# Patient Record
Sex: FEMALE | Race: BLACK OR AFRICAN AMERICAN | NOT HISPANIC OR LATINO | Employment: OTHER | URBAN - METROPOLITAN AREA
[De-identification: names, ages, dates, MRNs, and addresses within clinical notes are randomized per-mention and may not be internally consistent; named-entity substitution may affect disease eponyms.]

---

## 2021-03-30 ENCOUNTER — HOSPITAL ENCOUNTER (OUTPATIENT)
Dept: GENERAL RADIOLOGY | Facility: HOSPITAL | Age: 76
Discharge: HOME | End: 2021-03-30

## 2021-03-30 ENCOUNTER — LAB (OUTPATIENT)
Dept: LAB | Facility: HOSPITAL | Age: 76
End: 2021-03-30

## 2021-03-30 ENCOUNTER — HOSPITAL ENCOUNTER (OUTPATIENT)
Dept: CARDIOLOGY | Facility: HOSPITAL | Age: 76
Discharge: HOME | End: 2021-03-30

## 2021-03-30 ENCOUNTER — TRANSCRIBE ORDERS (OUTPATIENT)
Dept: ADMINISTRATIVE | Facility: HOSPITAL | Age: 76
End: 2021-03-30

## 2021-03-30 DIAGNOSIS — Z01.818 PREOP TESTING: ICD-10-CM

## 2021-03-30 DIAGNOSIS — M25.50 ARTHRALGIA, UNSPECIFIED JOINT: ICD-10-CM

## 2021-03-30 DIAGNOSIS — M25.50 ARTHRALGIA, UNSPECIFIED JOINT: Primary | ICD-10-CM

## 2021-03-30 LAB
ALBUMIN SERPL-MCNC: 3.6 G/DL (ref 3.5–5.2)
ALBUMIN/GLOB SERPL: 1.1 G/DL
ALP SERPL-CCNC: 101 U/L (ref 39–117)
ALT SERPL W P-5'-P-CCNC: 8 U/L (ref 1–33)
ANION GAP SERPL CALCULATED.3IONS-SCNC: 8.9 MMOL/L (ref 5–15)
AST SERPL-CCNC: 14 U/L (ref 1–32)
BACTERIA UR QL AUTO: ABNORMAL /HPF
BASOPHILS # BLD AUTO: 0.03 10*3/MM3 (ref 0–0.2)
BASOPHILS NFR BLD AUTO: 0.6 % (ref 0–1.5)
BILIRUB SERPL-MCNC: 0.4 MG/DL (ref 0–1.2)
BILIRUB UR QL STRIP: NEGATIVE
BUN SERPL-MCNC: 15 MG/DL (ref 8–23)
BUN/CREAT SERPL: 23.8 (ref 7–25)
CALCIUM SPEC-SCNC: 9.6 MG/DL (ref 8.6–10.5)
CHLORIDE SERPL-SCNC: 102 MMOL/L (ref 98–107)
CLARITY UR: CLEAR
CO2 SERPL-SCNC: 32.1 MMOL/L (ref 22–29)
COLOR UR: YELLOW
CREAT SERPL-MCNC: 0.63 MG/DL (ref 0.57–1)
DEPRECATED RDW RBC AUTO: 46 FL (ref 37–54)
EOSINOPHIL # BLD AUTO: 0.13 10*3/MM3 (ref 0–0.4)
EOSINOPHIL NFR BLD AUTO: 2.5 % (ref 0.3–6.2)
ERYTHROCYTE [DISTWIDTH] IN BLOOD BY AUTOMATED COUNT: 15.2 % (ref 12.3–15.4)
GFR SERPL CREATININE-BSD FRML MDRD: 112 ML/MIN/1.73
GLOBULIN UR ELPH-MCNC: 3.3 GM/DL
GLUCOSE SERPL-MCNC: 91 MG/DL (ref 65–99)
GLUCOSE UR STRIP-MCNC: NEGATIVE MG/DL
HCT VFR BLD AUTO: 45.6 % (ref 34–46.6)
HGB BLD-MCNC: 15 G/DL (ref 12–15.9)
HGB UR QL STRIP.AUTO: NEGATIVE
HYALINE CASTS UR QL AUTO: ABNORMAL /LPF
IMM GRANULOCYTES # BLD AUTO: 0.01 10*3/MM3 (ref 0–0.05)
IMM GRANULOCYTES NFR BLD AUTO: 0.2 % (ref 0–0.5)
INR PPP: 0.97 (ref 0.93–1.1)
KETONES UR QL STRIP: NEGATIVE
LEUKOCYTE ESTERASE UR QL STRIP.AUTO: NEGATIVE
LYMPHOCYTES # BLD AUTO: 1.44 10*3/MM3 (ref 0.7–3.1)
LYMPHOCYTES NFR BLD AUTO: 27.4 % (ref 19.6–45.3)
MCH RBC QN AUTO: 28.1 PG (ref 26.6–33)
MCHC RBC AUTO-ENTMCNC: 32.9 G/DL (ref 31.5–35.7)
MCV RBC AUTO: 85.6 FL (ref 79–97)
MONOCYTES # BLD AUTO: 0.41 10*3/MM3 (ref 0.1–0.9)
MONOCYTES NFR BLD AUTO: 7.8 % (ref 5–12)
NEUTROPHILS NFR BLD AUTO: 3.23 10*3/MM3 (ref 1.7–7)
NEUTROPHILS NFR BLD AUTO: 61.5 % (ref 42.7–76)
NITRITE UR QL STRIP: NEGATIVE
NRBC BLD AUTO-RTO: 0 /100 WBC (ref 0–0.2)
PH UR STRIP.AUTO: 6 [PH] (ref 5–8)
PLATELET # BLD AUTO: 240 10*3/MM3 (ref 140–450)
PMV BLD AUTO: 10.1 FL (ref 6–12)
POTASSIUM SERPL-SCNC: 3.9 MMOL/L (ref 3.5–5.2)
PROT SERPL-MCNC: 6.9 G/DL (ref 6–8.5)
PROT UR QL STRIP: ABNORMAL
PROTHROMBIN TIME: 10.7 SECONDS (ref 9.6–11.7)
QT INTERVAL: 448 MS
RBC # BLD AUTO: 5.33 10*6/MM3 (ref 3.77–5.28)
RBC # UR: ABNORMAL /HPF
REF LAB TEST METHOD: ABNORMAL
SODIUM SERPL-SCNC: 143 MMOL/L (ref 136–145)
SP GR UR STRIP: 1.02 (ref 1–1.03)
SQUAMOUS #/AREA URNS HPF: ABNORMAL /HPF
UROBILINOGEN UR QL STRIP: ABNORMAL
WBC # BLD AUTO: 5.25 10*3/MM3 (ref 3.4–10.8)
WBC UR QL AUTO: ABNORMAL /HPF

## 2021-03-30 PROCEDURE — 36415 COLL VENOUS BLD VENIPUNCTURE: CPT

## 2021-03-30 PROCEDURE — 81001 URINALYSIS AUTO W/SCOPE: CPT

## 2021-03-30 PROCEDURE — 93005 ELECTROCARDIOGRAM TRACING: CPT | Performed by: ORTHOPAEDIC SURGERY

## 2021-03-30 PROCEDURE — 93010 ELECTROCARDIOGRAM REPORT: CPT | Performed by: INTERNAL MEDICINE

## 2021-03-30 PROCEDURE — 71046 X-RAY EXAM CHEST 2 VIEWS: CPT

## 2021-03-30 PROCEDURE — 80053 COMPREHEN METABOLIC PANEL: CPT

## 2021-03-30 PROCEDURE — 85025 COMPLETE CBC W/AUTO DIFF WBC: CPT

## 2021-03-30 PROCEDURE — 85610 PROTHROMBIN TIME: CPT

## 2021-03-31 ENCOUNTER — APPOINTMENT (OUTPATIENT)
Dept: PREADMISSION TESTING | Facility: HOSPITAL | Age: 76
End: 2021-03-31

## 2021-04-12 RX ORDER — CHLORHEXIDINE GLUCONATE 4 G/100ML
1 SOLUTION TOPICAL SEE ADMIN INSTRUCTIONS
COMMUNITY
End: 2021-04-19 | Stop reason: HOSPADM

## 2021-04-12 RX ORDER — VILAZODONE HYDROCHLORIDE 40 MG/1
40 TABLET ORAL DAILY
COMMUNITY
Start: 2020-12-29

## 2021-04-12 RX ORDER — AMLODIPINE BESYLATE 5 MG/1
10 TABLET ORAL NIGHTLY
COMMUNITY
Start: 2020-12-29

## 2021-04-12 RX ORDER — ATORVASTATIN CALCIUM 20 MG/1
20 TABLET, FILM COATED ORAL DAILY
COMMUNITY
Start: 2020-12-29

## 2021-04-12 RX ORDER — ALLOPURINOL 100 MG/1
100 TABLET ORAL DAILY
COMMUNITY
Start: 2020-12-29

## 2021-04-12 RX ORDER — ALBUTEROL SULFATE 90 UG/1
2 AEROSOL, METERED RESPIRATORY (INHALATION) EVERY 4 HOURS PRN
COMMUNITY

## 2021-04-12 RX ORDER — POTASSIUM CHLORIDE 750 MG/1
TABLET, FILM COATED, EXTENDED RELEASE ORAL
COMMUNITY
Start: 2021-02-02

## 2021-04-12 RX ORDER — TRIAMTERENE AND HYDROCHLOROTHIAZIDE 75; 50 MG/1; MG/1
1 TABLET ORAL DAILY
COMMUNITY
Start: 2020-12-29

## 2021-04-13 ENCOUNTER — APPOINTMENT (OUTPATIENT)
Dept: GENERAL RADIOLOGY | Facility: HOSPITAL | Age: 76
End: 2021-04-13

## 2021-04-13 ENCOUNTER — ANESTHESIA EVENT (OUTPATIENT)
Dept: PERIOP | Facility: HOSPITAL | Age: 76
End: 2021-04-13

## 2021-04-13 ENCOUNTER — HOSPITAL ENCOUNTER (INPATIENT)
Facility: HOSPITAL | Age: 76
LOS: 6 days | Discharge: NURSING, REHAB OR LONG-TERM CARE | End: 2021-04-19
Attending: ORTHOPAEDIC SURGERY | Admitting: ORTHOPAEDIC SURGERY

## 2021-04-13 ENCOUNTER — TRANSCRIBE ORDERS (OUTPATIENT)
Dept: ADMINISTRATIVE | Facility: HOSPITAL | Age: 76
End: 2021-04-13

## 2021-04-13 ENCOUNTER — ANESTHESIA (OUTPATIENT)
Dept: PERIOP | Facility: HOSPITAL | Age: 76
End: 2021-04-13

## 2021-04-13 ENCOUNTER — LAB (OUTPATIENT)
Dept: LAB | Facility: HOSPITAL | Age: 76
End: 2021-04-13

## 2021-04-13 DIAGNOSIS — Z01.818 PRE-OP EXAM: ICD-10-CM

## 2021-04-13 DIAGNOSIS — Z01.818 PRE-OP EXAM: Primary | ICD-10-CM

## 2021-04-13 DIAGNOSIS — T84.038A MECHANICAL LOOSENING OF PROSTHETIC KNEE, INITIAL ENCOUNTER (HCC): Primary | ICD-10-CM

## 2021-04-13 DIAGNOSIS — Z96.659 MECHANICAL LOOSENING OF PROSTHETIC KNEE, INITIAL ENCOUNTER (HCC): Primary | ICD-10-CM

## 2021-04-13 PROBLEM — M19.90 DJD (DEGENERATIVE JOINT DISEASE): Status: ACTIVE | Noted: 2021-04-13

## 2021-04-13 LAB
ALBUMIN SERPL-MCNC: 3.5 G/DL (ref 3.5–5.2)
ALBUMIN/GLOB SERPL: 1.1 G/DL
ALP SERPL-CCNC: 122 U/L (ref 39–117)
ALT SERPL W P-5'-P-CCNC: 60 U/L (ref 1–33)
ANION GAP SERPL CALCULATED.3IONS-SCNC: 9.2 MMOL/L (ref 5–15)
ARTERIAL PATENCY WRIST A: POSITIVE
AST SERPL-CCNC: 77 U/L (ref 1–32)
ATMOSPHERIC PRESS: 751.8 MMHG
BASE EXCESS BLDA CALC-SCNC: 2.6 MMOL/L (ref 0–2)
BASOPHILS # BLD AUTO: 0.01 10*3/MM3 (ref 0–0.2)
BASOPHILS NFR BLD AUTO: 0.1 % (ref 0–1.5)
BDY SITE: ABNORMAL
BILIRUB SERPL-MCNC: 0.4 MG/DL (ref 0–1.2)
BUN SERPL-MCNC: 21 MG/DL (ref 8–23)
BUN/CREAT SERPL: 30.9 (ref 7–25)
CALCIUM SPEC-SCNC: 8.8 MG/DL (ref 8.6–10.5)
CHLORIDE SERPL-SCNC: 98 MMOL/L (ref 98–107)
CO2 SERPL-SCNC: 30.8 MMOL/L (ref 22–29)
CREAT SERPL-MCNC: 0.68 MG/DL (ref 0.57–1)
DEPRECATED RDW RBC AUTO: 45.3 FL (ref 37–54)
EOSINOPHIL # BLD AUTO: 0 10*3/MM3 (ref 0–0.4)
EOSINOPHIL NFR BLD AUTO: 0 % (ref 0.3–6.2)
ERYTHROCYTE [DISTWIDTH] IN BLOOD BY AUTOMATED COUNT: 14.9 % (ref 12.3–15.4)
GAS FLOW AIRWAY: 6 LPM
GFR SERPL CREATININE-BSD FRML MDRD: 103 ML/MIN/1.73
GLOBULIN UR ELPH-MCNC: 3.2 GM/DL
GLUCOSE BLDC GLUCOMTR-MCNC: 132 MG/DL (ref 70–130)
GLUCOSE BLDC GLUCOMTR-MCNC: 205 MG/DL (ref 70–130)
GLUCOSE BLDC GLUCOMTR-MCNC: 207 MG/DL (ref 70–130)
GLUCOSE SERPL-MCNC: 212 MG/DL (ref 65–99)
HCO3 BLDA-SCNC: 30.2 MMOL/L (ref 22–28)
HCT VFR BLD AUTO: 43.1 % (ref 34–46.6)
HGB BLD-MCNC: 13.6 G/DL (ref 12–15.9)
IMM GRANULOCYTES # BLD AUTO: 0.03 10*3/MM3 (ref 0–0.05)
IMM GRANULOCYTES NFR BLD AUTO: 0.4 % (ref 0–0.5)
LYMPHOCYTES # BLD AUTO: 0.34 10*3/MM3 (ref 0.7–3.1)
LYMPHOCYTES NFR BLD AUTO: 4.1 % (ref 19.6–45.3)
MCH RBC QN AUTO: 26.7 PG (ref 26.6–33)
MCHC RBC AUTO-ENTMCNC: 31.6 G/DL (ref 31.5–35.7)
MCV RBC AUTO: 84.7 FL (ref 79–97)
MODALITY: ABNORMAL
MONOCYTES # BLD AUTO: 0.17 10*3/MM3 (ref 0.1–0.9)
MONOCYTES NFR BLD AUTO: 2.1 % (ref 5–12)
NEUTROPHILS NFR BLD AUTO: 7.71 10*3/MM3 (ref 1.7–7)
NEUTROPHILS NFR BLD AUTO: 93.3 % (ref 42.7–76)
NRBC BLD AUTO-RTO: 0 /100 WBC (ref 0–0.2)
NT-PROBNP SERPL-MCNC: 125.2 PG/ML (ref 0–900)
PCO2 BLDA: 57.8 MM HG (ref 35–45)
PH BLDA: 7.33 PH UNITS (ref 7.35–7.45)
PLATELET # BLD AUTO: 231 10*3/MM3 (ref 140–450)
PMV BLD AUTO: 10.6 FL (ref 6–12)
PO2 BLDA: 87.2 MM HG (ref 80–100)
POTASSIUM SERPL-SCNC: 3.5 MMOL/L (ref 3.5–5.2)
PROT SERPL-MCNC: 6.7 G/DL (ref 6–8.5)
RBC # BLD AUTO: 5.09 10*6/MM3 (ref 3.77–5.28)
SAO2 % BLDCOA: 95.6 % (ref 92–99)
SARS-COV-2 RNA RESP QL NAA+PROBE: NOT DETECTED
SODIUM SERPL-SCNC: 138 MMOL/L (ref 136–145)
TOTAL RATE: 18 BREATHS/MINUTE
TROPONIN T SERPL-MCNC: <0.01 NG/ML (ref 0–0.03)
WBC # BLD AUTO: 8.26 10*3/MM3 (ref 3.4–10.8)

## 2021-04-13 PROCEDURE — 82803 BLOOD GASES ANY COMBINATION: CPT

## 2021-04-13 PROCEDURE — C1776 JOINT DEVICE (IMPLANTABLE): HCPCS | Performed by: ORTHOPAEDIC SURGERY

## 2021-04-13 PROCEDURE — 25010000003 CEFAZOLIN IN DEXTROSE 2-4 GM/100ML-% SOLUTION: Performed by: ORTHOPAEDIC SURGERY

## 2021-04-13 PROCEDURE — C1713 ANCHOR/SCREW BN/BN,TIS/BN: HCPCS | Performed by: ORTHOPAEDIC SURGERY

## 2021-04-13 PROCEDURE — 73560 X-RAY EXAM OF KNEE 1 OR 2: CPT

## 2021-04-13 PROCEDURE — 25010000002 ROPIVACAINE PER 1 MG: Performed by: ANESTHESIOLOGY

## 2021-04-13 PROCEDURE — 25010000002 NEOSTIGMINE 5 MG/10ML SOLUTION: Performed by: ANESTHESIOLOGY

## 2021-04-13 PROCEDURE — 36600 WITHDRAWAL OF ARTERIAL BLOOD: CPT

## 2021-04-13 PROCEDURE — 83880 ASSAY OF NATRIURETIC PEPTIDE: CPT | Performed by: HOSPITALIST

## 2021-04-13 PROCEDURE — 94799 UNLISTED PULMONARY SVC/PX: CPT

## 2021-04-13 PROCEDURE — U0003 INFECTIOUS AGENT DETECTION BY NUCLEIC ACID (DNA OR RNA); SEVERE ACUTE RESPIRATORY SYNDROME CORONAVIRUS 2 (SARS-COV-2) (CORONAVIRUS DISEASE [COVID-19]), AMPLIFIED PROBE TECHNIQUE, MAKING USE OF HIGH THROUGHPUT TECHNOLOGIES AS DESCRIBED BY CMS-2020-01-R: HCPCS | Performed by: ORTHOPAEDIC SURGERY

## 2021-04-13 PROCEDURE — 25010000003 CEFAZOLIN IN DEXTROSE 2-4 GM/100ML-% SOLUTION: Performed by: NURSE ANESTHETIST, CERTIFIED REGISTERED

## 2021-04-13 PROCEDURE — 25010000002 PROPOFOL 10 MG/ML EMULSION: Performed by: NURSE ANESTHETIST, CERTIFIED REGISTERED

## 2021-04-13 PROCEDURE — 93005 ELECTROCARDIOGRAM TRACING: CPT | Performed by: HOSPITALIST

## 2021-04-13 PROCEDURE — 76942 ECHO GUIDE FOR BIOPSY: CPT | Performed by: ORTHOPAEDIC SURGERY

## 2021-04-13 PROCEDURE — 71045 X-RAY EXAM CHEST 1 VIEW: CPT

## 2021-04-13 PROCEDURE — 84484 ASSAY OF TROPONIN QUANT: CPT | Performed by: HOSPITALIST

## 2021-04-13 PROCEDURE — 25010000002 MIDAZOLAM PER 1 MG: Performed by: ANESTHESIOLOGY

## 2021-04-13 PROCEDURE — G0378 HOSPITAL OBSERVATION PER HR: HCPCS

## 2021-04-13 PROCEDURE — 25010000002 KETOROLAC TROMETHAMINE PER 15 MG: Performed by: ORTHOPAEDIC SURGERY

## 2021-04-13 PROCEDURE — 25010000002 ONDANSETRON PER 1 MG: Performed by: NURSE ANESTHETIST, CERTIFIED REGISTERED

## 2021-04-13 PROCEDURE — 94760 N-INVAS EAR/PLS OXIMETRY 1: CPT

## 2021-04-13 PROCEDURE — 25010000002 ONDANSETRON PER 1 MG: Performed by: ANESTHESIOLOGY

## 2021-04-13 PROCEDURE — 82962 GLUCOSE BLOOD TEST: CPT

## 2021-04-13 PROCEDURE — 25010000002 FENTANYL CITRATE (PF) 100 MCG/2ML SOLUTION: Performed by: NURSE ANESTHETIST, CERTIFIED REGISTERED

## 2021-04-13 PROCEDURE — 25010000002 MORPHINE PER 10 MG: Performed by: ORTHOPAEDIC SURGERY

## 2021-04-13 PROCEDURE — 0SPD0JZ REMOVAL OF SYNTHETIC SUBSTITUTE FROM LEFT KNEE JOINT, OPEN APPROACH: ICD-10-PCS | Performed by: ORTHOPAEDIC SURGERY

## 2021-04-13 PROCEDURE — 25010000002 PHENYLEPHRINE PER 1 ML: Performed by: NURSE ANESTHETIST, CERTIFIED REGISTERED

## 2021-04-13 PROCEDURE — C9803 HOPD COVID-19 SPEC COLLECT: HCPCS | Performed by: ORTHOPAEDIC SURGERY

## 2021-04-13 PROCEDURE — 25010000002 ROPIVACAINE PER 1 MG: Performed by: ORTHOPAEDIC SURGERY

## 2021-04-13 PROCEDURE — 25010000002 FENTANYL CITRATE (PF) 100 MCG/2ML SOLUTION: Performed by: ANESTHESIOLOGY

## 2021-04-13 PROCEDURE — 80053 COMPREHEN METABOLIC PANEL: CPT | Performed by: NURSE PRACTITIONER

## 2021-04-13 PROCEDURE — 0SRD0J9 REPLACEMENT OF LEFT KNEE JOINT WITH SYNTHETIC SUBSTITUTE, CEMENTED, OPEN APPROACH: ICD-10-PCS | Performed by: ORTHOPAEDIC SURGERY

## 2021-04-13 PROCEDURE — 25010000002 EPINEPHRINE 30 MG/30ML SOLUTION: Performed by: ORTHOPAEDIC SURGERY

## 2021-04-13 PROCEDURE — 85025 COMPLETE CBC W/AUTO DIFF WBC: CPT | Performed by: NURSE PRACTITIONER

## 2021-04-13 PROCEDURE — 25010000002 DEXAMETHASONE PER 1 MG: Performed by: NURSE ANESTHETIST, CERTIFIED REGISTERED

## 2021-04-13 DEVICE — IMPLANTABLE DEVICE: Type: IMPLANTABLE DEVICE | Site: KNEE | Status: FUNCTIONAL

## 2021-04-13 DEVICE — EXT STEM FEM/KN NEXGEN STR 15X75MM 30MM: Type: IMPLANTABLE DEVICE | Site: KNEE | Status: FUNCTIONAL

## 2021-04-13 DEVICE — CMT BONE R 1X40: Type: IMPLANTABLE DEVICE | Site: KNEE | Status: FUNCTIONAL

## 2021-04-13 RX ORDER — ONDANSETRON 2 MG/ML
4 INJECTION INTRAMUSCULAR; INTRAVENOUS ONCE AS NEEDED
Status: COMPLETED | OUTPATIENT
Start: 2021-04-13 | End: 2021-04-13

## 2021-04-13 RX ORDER — SODIUM CHLORIDE 0.9 % (FLUSH) 0.9 %
3 SYRINGE (ML) INJECTION EVERY 12 HOURS SCHEDULED
Status: DISCONTINUED | OUTPATIENT
Start: 2021-04-13 | End: 2021-04-13 | Stop reason: HOSPADM

## 2021-04-13 RX ORDER — ALLOPURINOL 100 MG/1
100 TABLET ORAL DAILY
Status: DISCONTINUED | OUTPATIENT
Start: 2021-04-14 | End: 2021-04-19 | Stop reason: HOSPADM

## 2021-04-13 RX ORDER — NALOXONE HCL 0.4 MG/ML
0.4 VIAL (ML) INJECTION
Status: DISCONTINUED | OUTPATIENT
Start: 2021-04-13 | End: 2021-04-19 | Stop reason: HOSPADM

## 2021-04-13 RX ORDER — ONDANSETRON 2 MG/ML
INJECTION INTRAMUSCULAR; INTRAVENOUS AS NEEDED
Status: DISCONTINUED | OUTPATIENT
Start: 2021-04-13 | End: 2021-04-13 | Stop reason: SURG

## 2021-04-13 RX ORDER — HYDROMORPHONE HYDROCHLORIDE 1 MG/ML
0.5 INJECTION, SOLUTION INTRAMUSCULAR; INTRAVENOUS; SUBCUTANEOUS
Status: DISCONTINUED | OUTPATIENT
Start: 2021-04-13 | End: 2021-04-13 | Stop reason: HOSPADM

## 2021-04-13 RX ORDER — SODIUM CHLORIDE 0.9 % (FLUSH) 0.9 %
1-10 SYRINGE (ML) INJECTION AS NEEDED
Status: DISCONTINUED | OUTPATIENT
Start: 2021-04-13 | End: 2021-04-19 | Stop reason: HOSPADM

## 2021-04-13 RX ORDER — PROMETHAZINE HYDROCHLORIDE 12.5 MG/1
12.5 TABLET ORAL EVERY 6 HOURS PRN
Status: DISCONTINUED | OUTPATIENT
Start: 2021-04-13 | End: 2021-04-19 | Stop reason: HOSPADM

## 2021-04-13 RX ORDER — PROPOFOL 10 MG/ML
VIAL (ML) INTRAVENOUS AS NEEDED
Status: DISCONTINUED | OUTPATIENT
Start: 2021-04-13 | End: 2021-04-13 | Stop reason: SURG

## 2021-04-13 RX ORDER — ONDANSETRON 2 MG/ML
4 INJECTION INTRAMUSCULAR; INTRAVENOUS EVERY 6 HOURS PRN
Status: DISCONTINUED | OUTPATIENT
Start: 2021-04-13 | End: 2021-04-19 | Stop reason: HOSPADM

## 2021-04-13 RX ORDER — GLYCOPYRROLATE 0.2 MG/ML
INJECTION INTRAMUSCULAR; INTRAVENOUS AS NEEDED
Status: DISCONTINUED | OUTPATIENT
Start: 2021-04-13 | End: 2021-04-13 | Stop reason: SURG

## 2021-04-13 RX ORDER — CLINDAMYCIN PHOSPHATE 900 MG/50ML
900 INJECTION INTRAVENOUS EVERY 8 HOURS
Status: COMPLETED | OUTPATIENT
Start: 2021-04-13 | End: 2021-04-14

## 2021-04-13 RX ORDER — VILAZODONE HYDROCHLORIDE 40 MG/1
40 TABLET ORAL DAILY
Status: DISCONTINUED | OUTPATIENT
Start: 2021-04-14 | End: 2021-04-17

## 2021-04-13 RX ORDER — FENTANYL CITRATE 50 UG/ML
50 INJECTION, SOLUTION INTRAMUSCULAR; INTRAVENOUS
Status: DISCONTINUED | OUTPATIENT
Start: 2021-04-13 | End: 2021-04-13 | Stop reason: HOSPADM

## 2021-04-13 RX ORDER — FLUMAZENIL 0.1 MG/ML
0.2 INJECTION INTRAVENOUS AS NEEDED
Status: DISCONTINUED | OUTPATIENT
Start: 2021-04-13 | End: 2021-04-13 | Stop reason: HOSPADM

## 2021-04-13 RX ORDER — DIPHENHYDRAMINE HCL 25 MG
25 CAPSULE ORAL
Status: DISCONTINUED | OUTPATIENT
Start: 2021-04-13 | End: 2021-04-13 | Stop reason: HOSPADM

## 2021-04-13 RX ORDER — NEOSTIGMINE METHYLSULFATE 0.5 MG/ML
INJECTION, SOLUTION INTRAVENOUS AS NEEDED
Status: DISCONTINUED | OUTPATIENT
Start: 2021-04-13 | End: 2021-04-13 | Stop reason: SURG

## 2021-04-13 RX ORDER — HYDROCODONE BITARTRATE AND ACETAMINOPHEN 7.5; 325 MG/1; MG/1
1 TABLET ORAL ONCE AS NEEDED
Status: DISCONTINUED | OUTPATIENT
Start: 2021-04-13 | End: 2021-04-13 | Stop reason: HOSPADM

## 2021-04-13 RX ORDER — FENTANYL CITRATE 50 UG/ML
INJECTION, SOLUTION INTRAMUSCULAR; INTRAVENOUS AS NEEDED
Status: DISCONTINUED | OUTPATIENT
Start: 2021-04-13 | End: 2021-04-13 | Stop reason: SURG

## 2021-04-13 RX ORDER — ROPIVACAINE HYDROCHLORIDE 5 MG/ML
INJECTION, SOLUTION EPIDURAL; INFILTRATION; PERINEURAL
Status: COMPLETED | OUTPATIENT
Start: 2021-04-13 | End: 2021-04-13

## 2021-04-13 RX ORDER — ATORVASTATIN CALCIUM 20 MG/1
20 TABLET, FILM COATED ORAL DAILY
Status: DISCONTINUED | OUTPATIENT
Start: 2021-04-14 | End: 2021-04-19 | Stop reason: HOSPADM

## 2021-04-13 RX ORDER — ALBUTEROL SULFATE 2.5 MG/3ML
2.5 SOLUTION RESPIRATORY (INHALATION) EVERY 4 HOURS PRN
Status: DISCONTINUED | OUTPATIENT
Start: 2021-04-13 | End: 2021-04-19 | Stop reason: HOSPADM

## 2021-04-13 RX ORDER — MAGNESIUM HYDROXIDE 1200 MG/15ML
LIQUID ORAL AS NEEDED
Status: DISCONTINUED | OUTPATIENT
Start: 2021-04-13 | End: 2021-04-13 | Stop reason: HOSPADM

## 2021-04-13 RX ORDER — MIDAZOLAM HYDROCHLORIDE 1 MG/ML
0.5 INJECTION INTRAMUSCULAR; INTRAVENOUS
Status: DISCONTINUED | OUTPATIENT
Start: 2021-04-13 | End: 2021-04-13 | Stop reason: HOSPADM

## 2021-04-13 RX ORDER — HYDRALAZINE HYDROCHLORIDE 20 MG/ML
5 INJECTION INTRAMUSCULAR; INTRAVENOUS
Status: DISCONTINUED | OUTPATIENT
Start: 2021-04-13 | End: 2021-04-13 | Stop reason: HOSPADM

## 2021-04-13 RX ORDER — CHOLECALCIFEROL (VITAMIN D3) 125 MCG
5 CAPSULE ORAL NIGHTLY PRN
Status: DISCONTINUED | OUTPATIENT
Start: 2021-04-13 | End: 2021-04-19 | Stop reason: HOSPADM

## 2021-04-13 RX ORDER — MIDAZOLAM HYDROCHLORIDE 1 MG/ML
1 INJECTION INTRAMUSCULAR; INTRAVENOUS
Status: DISCONTINUED | OUTPATIENT
Start: 2021-04-13 | End: 2021-04-13 | Stop reason: HOSPADM

## 2021-04-13 RX ORDER — OXYCODONE HYDROCHLORIDE AND ACETAMINOPHEN 5; 325 MG/1; MG/1
1 TABLET ORAL EVERY 4 HOURS PRN
Status: DISCONTINUED | OUTPATIENT
Start: 2021-04-13 | End: 2021-04-19 | Stop reason: HOSPADM

## 2021-04-13 RX ORDER — EPHEDRINE SULFATE 50 MG/ML
5 INJECTION, SOLUTION INTRAVENOUS ONCE AS NEEDED
Status: DISCONTINUED | OUTPATIENT
Start: 2021-04-13 | End: 2021-04-13 | Stop reason: HOSPADM

## 2021-04-13 RX ORDER — LIDOCAINE HYDROCHLORIDE 10 MG/ML
0.5 INJECTION, SOLUTION EPIDURAL; INFILTRATION; INTRACAUDAL; PERINEURAL ONCE AS NEEDED
Status: DISCONTINUED | OUTPATIENT
Start: 2021-04-13 | End: 2021-04-13 | Stop reason: HOSPADM

## 2021-04-13 RX ORDER — ACETAMINOPHEN 500 MG
1000 TABLET ORAL ONCE
Status: COMPLETED | OUTPATIENT
Start: 2021-04-13 | End: 2021-04-13

## 2021-04-13 RX ORDER — NALOXONE HCL 0.4 MG/ML
0.2 VIAL (ML) INJECTION AS NEEDED
Status: DISCONTINUED | OUTPATIENT
Start: 2021-04-13 | End: 2021-04-13 | Stop reason: HOSPADM

## 2021-04-13 RX ORDER — AMLODIPINE BESYLATE 10 MG/1
10 TABLET ORAL NIGHTLY
Status: DISCONTINUED | OUTPATIENT
Start: 2021-04-13 | End: 2021-04-13

## 2021-04-13 RX ORDER — CEFAZOLIN SODIUM 2 G/100ML
2 INJECTION, SOLUTION INTRAVENOUS ONCE
Status: COMPLETED | OUTPATIENT
Start: 2021-04-13 | End: 2021-04-13

## 2021-04-13 RX ORDER — SODIUM CHLORIDE 0.9 % (FLUSH) 0.9 %
3-10 SYRINGE (ML) INJECTION AS NEEDED
Status: DISCONTINUED | OUTPATIENT
Start: 2021-04-13 | End: 2021-04-13 | Stop reason: HOSPADM

## 2021-04-13 RX ORDER — LIDOCAINE HYDROCHLORIDE 20 MG/ML
INJECTION, SOLUTION INFILTRATION; PERINEURAL AS NEEDED
Status: DISCONTINUED | OUTPATIENT
Start: 2021-04-13 | End: 2021-04-13 | Stop reason: SURG

## 2021-04-13 RX ORDER — FAMOTIDINE 10 MG/ML
20 INJECTION, SOLUTION INTRAVENOUS ONCE
Status: COMPLETED | OUTPATIENT
Start: 2021-04-13 | End: 2021-04-13

## 2021-04-13 RX ORDER — SODIUM CHLORIDE 0.9 % (FLUSH) 0.9 %
3 SYRINGE (ML) INJECTION EVERY 12 HOURS SCHEDULED
Status: DISCONTINUED | OUTPATIENT
Start: 2021-04-13 | End: 2021-04-19 | Stop reason: HOSPADM

## 2021-04-13 RX ORDER — DIPHENHYDRAMINE HYDROCHLORIDE 50 MG/ML
12.5 INJECTION INTRAMUSCULAR; INTRAVENOUS
Status: DISCONTINUED | OUTPATIENT
Start: 2021-04-13 | End: 2021-04-13 | Stop reason: HOSPADM

## 2021-04-13 RX ORDER — DOCUSATE SODIUM 100 MG/1
100 CAPSULE, LIQUID FILLED ORAL 2 TIMES DAILY PRN
Status: DISCONTINUED | OUTPATIENT
Start: 2021-04-13 | End: 2021-04-19 | Stop reason: HOSPADM

## 2021-04-13 RX ORDER — CELECOXIB 200 MG/1
200 CAPSULE ORAL ONCE
Status: COMPLETED | OUTPATIENT
Start: 2021-04-13 | End: 2021-04-13

## 2021-04-13 RX ORDER — FERROUS SULFATE 325(65) MG
325 TABLET ORAL
Status: DISCONTINUED | OUTPATIENT
Start: 2021-04-14 | End: 2021-04-17

## 2021-04-13 RX ORDER — EPHEDRINE SULFATE 50 MG/ML
INJECTION, SOLUTION INTRAVENOUS AS NEEDED
Status: DISCONTINUED | OUTPATIENT
Start: 2021-04-13 | End: 2021-04-13 | Stop reason: SURG

## 2021-04-13 RX ORDER — ASPIRIN 325 MG
325 TABLET, DELAYED RELEASE (ENTERIC COATED) ORAL 2 TIMES DAILY WITH MEALS
Status: DISCONTINUED | OUTPATIENT
Start: 2021-04-14 | End: 2021-04-19 | Stop reason: HOSPADM

## 2021-04-13 RX ORDER — PROMETHAZINE HYDROCHLORIDE 25 MG/1
25 SUPPOSITORY RECTAL ONCE AS NEEDED
Status: DISCONTINUED | OUTPATIENT
Start: 2021-04-13 | End: 2021-04-13 | Stop reason: HOSPADM

## 2021-04-13 RX ORDER — ALBUTEROL SULFATE 90 UG/1
2 AEROSOL, METERED RESPIRATORY (INHALATION) EVERY 4 HOURS PRN
Status: DISCONTINUED | OUTPATIENT
Start: 2021-04-13 | End: 2021-04-13 | Stop reason: CLARIF

## 2021-04-13 RX ORDER — ONDANSETRON 4 MG/1
4 TABLET, FILM COATED ORAL EVERY 6 HOURS PRN
Status: DISCONTINUED | OUTPATIENT
Start: 2021-04-13 | End: 2021-04-19 | Stop reason: HOSPADM

## 2021-04-13 RX ORDER — DIAZEPAM 5 MG/1
5 TABLET ORAL 2 TIMES DAILY PRN
Status: DISCONTINUED | OUTPATIENT
Start: 2021-04-13 | End: 2021-04-17

## 2021-04-13 RX ORDER — DEXAMETHASONE SODIUM PHOSPHATE 10 MG/ML
INJECTION INTRAMUSCULAR; INTRAVENOUS AS NEEDED
Status: DISCONTINUED | OUTPATIENT
Start: 2021-04-13 | End: 2021-04-13 | Stop reason: SURG

## 2021-04-13 RX ORDER — SODIUM CHLORIDE 450 MG/100ML
100 INJECTION, SOLUTION INTRAVENOUS CONTINUOUS
Status: DISCONTINUED | OUTPATIENT
Start: 2021-04-13 | End: 2021-04-13

## 2021-04-13 RX ORDER — TRIAMTERENE AND HYDROCHLOROTHIAZIDE 75; 50 MG/1; MG/1
1 TABLET ORAL DAILY
Status: DISCONTINUED | OUTPATIENT
Start: 2021-04-14 | End: 2021-04-19 | Stop reason: HOSPADM

## 2021-04-13 RX ORDER — OXYCODONE HYDROCHLORIDE AND ACETAMINOPHEN 5; 325 MG/1; MG/1
1-2 TABLET ORAL EVERY 4 HOURS PRN
Qty: 60 TABLET | Refills: 0 | Status: SHIPPED | OUTPATIENT
Start: 2021-04-13 | End: 2021-04-19 | Stop reason: SDUPTHER

## 2021-04-13 RX ORDER — OXYCODONE HYDROCHLORIDE AND ACETAMINOPHEN 5; 325 MG/1; MG/1
2 TABLET ORAL EVERY 4 HOURS PRN
Status: DISCONTINUED | OUTPATIENT
Start: 2021-04-13 | End: 2021-04-17

## 2021-04-13 RX ORDER — OXYCODONE AND ACETAMINOPHEN 7.5; 325 MG/1; MG/1
1 TABLET ORAL ONCE AS NEEDED
Status: DISCONTINUED | OUTPATIENT
Start: 2021-04-13 | End: 2021-04-13 | Stop reason: HOSPADM

## 2021-04-13 RX ORDER — MORPHINE SULFATE 2 MG/ML
4 INJECTION, SOLUTION INTRAMUSCULAR; INTRAVENOUS
Status: DISPENSED | OUTPATIENT
Start: 2021-04-13 | End: 2021-04-16

## 2021-04-13 RX ORDER — LABETALOL HYDROCHLORIDE 5 MG/ML
5 INJECTION, SOLUTION INTRAVENOUS
Status: DISCONTINUED | OUTPATIENT
Start: 2021-04-13 | End: 2021-04-13 | Stop reason: HOSPADM

## 2021-04-13 RX ORDER — ROCURONIUM BROMIDE 10 MG/ML
INJECTION, SOLUTION INTRAVENOUS AS NEEDED
Status: DISCONTINUED | OUTPATIENT
Start: 2021-04-13 | End: 2021-04-13 | Stop reason: SURG

## 2021-04-13 RX ORDER — FUROSEMIDE 10 MG/ML
40 INJECTION INTRAMUSCULAR; INTRAVENOUS ONCE
Status: DISCONTINUED | OUTPATIENT
Start: 2021-04-14 | End: 2021-04-13

## 2021-04-13 RX ORDER — PROMETHAZINE HYDROCHLORIDE 25 MG/1
25 TABLET ORAL ONCE AS NEEDED
Status: DISCONTINUED | OUTPATIENT
Start: 2021-04-13 | End: 2021-04-13 | Stop reason: HOSPADM

## 2021-04-13 RX ORDER — KETAMINE HYDROCHLORIDE 10 MG/ML
INJECTION INTRAMUSCULAR; INTRAVENOUS AS NEEDED
Status: DISCONTINUED | OUTPATIENT
Start: 2021-04-13 | End: 2021-04-13 | Stop reason: SURG

## 2021-04-13 RX ORDER — ACETAMINOPHEN 325 MG/1
325 TABLET ORAL EVERY 4 HOURS PRN
Status: DISCONTINUED | OUTPATIENT
Start: 2021-04-13 | End: 2021-04-19 | Stop reason: HOSPADM

## 2021-04-13 RX ORDER — POTASSIUM CHLORIDE 750 MG/1
10 TABLET, FILM COATED, EXTENDED RELEASE ORAL DAILY
Status: DISCONTINUED | OUTPATIENT
Start: 2021-04-14 | End: 2021-04-19 | Stop reason: HOSPADM

## 2021-04-13 RX ORDER — SODIUM CHLORIDE, SODIUM LACTATE, POTASSIUM CHLORIDE, CALCIUM CHLORIDE 600; 310; 30; 20 MG/100ML; MG/100ML; MG/100ML; MG/100ML
9 INJECTION, SOLUTION INTRAVENOUS CONTINUOUS
Status: DISCONTINUED | OUTPATIENT
Start: 2021-04-13 | End: 2021-04-13

## 2021-04-13 RX ORDER — CEFAZOLIN SODIUM 2 G/100ML
INJECTION, SOLUTION INTRAVENOUS AS NEEDED
Status: DISCONTINUED | OUTPATIENT
Start: 2021-04-13 | End: 2021-04-13 | Stop reason: SURG

## 2021-04-13 RX ADMIN — ONDANSETRON 4 MG: 2 INJECTION INTRAMUSCULAR; INTRAVENOUS at 17:33

## 2021-04-13 RX ADMIN — PHENYLEPHRINE HYDROCHLORIDE 200 MCG: 10 INJECTION INTRAVENOUS at 13:07

## 2021-04-13 RX ADMIN — MIDAZOLAM 2 MG: 1 INJECTION INTRAMUSCULAR; INTRAVENOUS at 12:20

## 2021-04-13 RX ADMIN — ACETAMINOPHEN 1000 MG: 500 TABLET ORAL at 11:23

## 2021-04-13 RX ADMIN — CEFAZOLIN SODIUM 2 G: 2 INJECTION, SOLUTION INTRAVENOUS at 15:40

## 2021-04-13 RX ADMIN — MUPIROCIN 1 APPLICATION: 20 OINTMENT TOPICAL at 20:44

## 2021-04-13 RX ADMIN — KETAMINE HYDROCHLORIDE 10 MG: 10 INJECTION INTRAMUSCULAR; INTRAVENOUS at 15:57

## 2021-04-13 RX ADMIN — FENTANYL CITRATE 50 MCG: 50 INJECTION INTRAMUSCULAR; INTRAVENOUS at 14:44

## 2021-04-13 RX ADMIN — METOPROLOL TARTRATE 25 MG: 25 TABLET, FILM COATED ORAL at 20:43

## 2021-04-13 RX ADMIN — SODIUM CHLORIDE 100 ML/HR: 4.5 INJECTION, SOLUTION INTRAVENOUS at 20:44

## 2021-04-13 RX ADMIN — CELECOXIB 200 MG: 200 CAPSULE ORAL at 11:23

## 2021-04-13 RX ADMIN — GLYCOPYRROLATE 0.4 MG: 0.2 INJECTION INTRAMUSCULAR; INTRAVENOUS at 15:55

## 2021-04-13 RX ADMIN — LIDOCAINE HYDROCHLORIDE 75 MG: 20 INJECTION, SOLUTION INFILTRATION; PERINEURAL at 12:55

## 2021-04-13 RX ADMIN — FAMOTIDINE 20 MG: 10 INJECTION INTRAVENOUS at 11:30

## 2021-04-13 RX ADMIN — EPHEDRINE SULFATE 10 MG: 50 INJECTION INTRAVENOUS at 15:55

## 2021-04-13 RX ADMIN — ROPIVACAINE HYDROCHLORIDE 30 ML: 5 INJECTION, SOLUTION EPIDURAL; INFILTRATION; PERINEURAL at 12:24

## 2021-04-13 RX ADMIN — CEFAZOLIN SODIUM 2 G: 2 INJECTION, SOLUTION INTRAVENOUS at 12:40

## 2021-04-13 RX ADMIN — ONDANSETRON 4 MG: 2 INJECTION INTRAMUSCULAR; INTRAVENOUS at 15:46

## 2021-04-13 RX ADMIN — FENTANYL CITRATE 50 MCG: 50 INJECTION, SOLUTION INTRAMUSCULAR; INTRAVENOUS at 12:20

## 2021-04-13 RX ADMIN — EPHEDRINE SULFATE 15 MG: 50 INJECTION INTRAVENOUS at 13:26

## 2021-04-13 RX ADMIN — KETAMINE HYDROCHLORIDE 20 MG: 10 INJECTION INTRAMUSCULAR; INTRAVENOUS at 12:55

## 2021-04-13 RX ADMIN — PROPOFOL 150 MG: 10 INJECTION, EMULSION INTRAVENOUS at 12:55

## 2021-04-13 RX ADMIN — FENTANYL CITRATE 50 MCG: 50 INJECTION INTRAMUSCULAR; INTRAVENOUS at 15:32

## 2021-04-13 RX ADMIN — KETAMINE HYDROCHLORIDE 10 MG: 10 INJECTION INTRAMUSCULAR; INTRAVENOUS at 15:14

## 2021-04-13 RX ADMIN — DEXAMETHASONE SODIUM PHOSPHATE 4 MG: 10 INJECTION INTRAMUSCULAR; INTRAVENOUS at 12:55

## 2021-04-13 RX ADMIN — FENTANYL CITRATE 50 MCG: 50 INJECTION, SOLUTION INTRAMUSCULAR; INTRAVENOUS at 17:00

## 2021-04-13 RX ADMIN — KETAMINE HYDROCHLORIDE 10 MG: 10 INJECTION INTRAMUSCULAR; INTRAVENOUS at 14:15

## 2021-04-13 RX ADMIN — CLINDAMYCIN PHOSPHATE 900 MG: 900 INJECTION, SOLUTION INTRAVENOUS at 22:24

## 2021-04-13 RX ADMIN — ROCURONIUM BROMIDE 30 MG: 50 INJECTION INTRAVENOUS at 12:55

## 2021-04-13 RX ADMIN — PHENYLEPHRINE HYDROCHLORIDE 200 MCG: 10 INJECTION INTRAVENOUS at 13:26

## 2021-04-13 RX ADMIN — NEOSTIGMINE METHYLSULFATE 2.5 MG: 0.5 INJECTION INTRAVENOUS at 15:55

## 2021-04-13 RX ADMIN — SODIUM CHLORIDE, PRESERVATIVE FREE 3 ML: 5 INJECTION INTRAVENOUS at 20:44

## 2021-04-13 RX ADMIN — SODIUM CHLORIDE, POTASSIUM CHLORIDE, SODIUM LACTATE AND CALCIUM CHLORIDE 9 ML/HR: 600; 310; 30; 20 INJECTION, SOLUTION INTRAVENOUS at 11:28

## 2021-04-13 NOTE — H&P
"  Orthopaedic Surgery History and Physical    Patient Name:  Jenn Dupont  YOB: 1945   Age: 75 y.o.  Medical Records Number:  [id number]    Date of Admission:  4/13/2021  9:52 AM    Chief Complaint:  Aseptic loosening of prosthetic knee (CMS/[facility]) [T84.038A, Z96.659]    Jenn Dupont is a 75 y.o. female who presents c/o severe left knee pain.  The pain has been on and off for many years, worsening to the point where the pain is becoming disabling.  She had a left tka that was functioning well, but she has noticed progressive instability. The pain is a constant dull ache with occasional sharp, stabbing pain.  The patient has failed conservative treatment and would like to proceed with left total knee arthroplasty revision    /51 (BP Location: Right arm, Patient Position: Lying)   Pulse 72   Temp 97.2 °F (36.2 °C) (Oral)   Resp 18   Ht 152.4 cm (60\")   Wt 110 kg (243 lb 6.2 oz)   SpO2 98%   BMI 47.53 kg/m²     Past Medical History:    Past Medical History:   Diagnosis Date   • Asthma     ALLERGY INDUCED    • Coronary artery disease    • Diabetes mellitus (CMS/[facility])    • Elevated cholesterol    • Hypertension    • Knee pain, left    • Low back pain    • MI (myocardial infarction) (CMS/[facility]) 2013   • Shoulder pain, left    • Urinary frequency        Past Surgical History:   Past Surgical History:   Procedure Laterality Date   • CHOLECYSTECTOMY     • CORONARY ANGIOPLASTY WITH STENT PLACEMENT  2013   • HYSTERECTOMY     • TONSILLECTOMY     • TOTAL KNEE ARTHROPLASTY Left 2015   • TOTAL KNEE ARTHROPLASTY Right 2015       Social History:    Social History     Socioeconomic History   • Marital status:      Spouse name: Not on file   • Number of children: Not on file   • Years of education: Not on file   • Highest education level: Not on file   Tobacco Use   • Smoking status: Current Some Day Smoker   • Smokeless tobacco: Never Used   • Tobacco comment: 3-4 CIGS A DAY /last smoke " 4/13/2021 early am   Vaping Use   • Vaping Use: Never used   Substance and Sexual Activity   • Alcohol use: Never   • Drug use: Never   • Sexual activity: Defer       Family History:    Family History   Problem Relation Age of Onset   • Malig Hyperthermia Neg Hx        Current Medications:  Scheduled Meds:acetaminophen, 1,000 mg, Oral, Once  ceFAZolin, 2 g, Intravenous, Once  celecoxib, 200 mg, Oral, Once      Continuous Infusions:   PRN Meds:.    Current Facility-Administered Medications:   •  acetaminophen (TYLENOL) tablet 1,000 mg, 1,000 mg, Oral, Once, Rahat Camargo MD  •  ceFAZolin in dextrose (ANCEF) IVPB solution 2 g, 2 g, Intravenous, Once, Rahat Camargo MD  •  celecoxib (CeleBREX) capsule 200 mg, 200 mg, Oral, Once, Rahat Camargo MD    Allergies:  No Known Allergies    Review of Systems:   HEENT: Patient denies any headaches, vision changes, change in hearing, or tinnitus, Patient denies any rhinorrhea,epistaxis, sinus pain, mouth or dental problems, sore throat or hoarseness, or dysphagia  Pulmonary: Patient denies any cough, congestion, SOA, or wheezing  Cardiovascular: Patient denies any chest pain, dyspnea, palpitations, weakness, intolerance of exercise, varicosities, swelling of extremities, known murmur  Gastrointestinal:  Patient denies nausea, vomiting, diarrhea, constipation, loss  of appetite, change in appetite, dysphagia, gas, heartburn, melena, change in bowel habits, use of laxatives or other drugs to alter the function of the gastrointestinal tract.  Genital/Urinary: Patient denies dysuria, change in color of urine, change in frequency of urination, pain with urgency, incontinence, retention, or nocturia.  Musculoskeletal: Patient denies increased warmth; redness; or swelling of joints; limitation of function; deformity; crepitation: pain in a joint or an extremity, the neck, or the back, especially with movement.  Neurological: Patient denies dizziness, tremor, ataxia,  difficulty in speaking, change in speech, paresthesia, loss of sensation, seizures, syncope, changes in memory.  Endocrine system: Patient denies tremors, palpitations, intolerance of heat or cold, polyuria, polydipsia, polyphagia, diaphoresis, exophthalmos, or goiter.  Psychological: Patient denies thoughts/plans or harming self or other; depression,  insomnia, night terrors, veronica, memory loss, disorientation.  Skin: Patient denies any bruising, rashes, discoloration, pruritus, wounds, ulcers, decubiti, changes in the hair or nails  Hematopoietic: Patient denies history of spontaneous or excessive bleeding, epistaxis, hematuria, melena, fatigue, enlarged or tender lymph nodes, pallor, history of anemia.        Physical Exam:   Awake, A&O x3, affect normal, no acute distress  Ambulating with a limp due to knee pain  Knee ROM is limited due to pain (5-115)  Strength is 4/5 in the quad, hamstring and calf  Cap refill is normal, Sensation intact, gross instability in all planes    Card:  RR, HD Stable  Pulm:  Regular breathing, no S.O.A  Abd:  Soft, NT, ND    Lab Results (last 24 hours)     Procedure Component Value Units Date/Time    COVID PRE-OP / PRE-PROCEDURE SCREENING ORDER (NO ISOLATION) - Swab, Nasopharynx [[id number]]  (Normal) Collected: 04/13/21 0939    Specimen: Swab from Nasopharynx Updated: 04/13/21 1038    Narrative:      The following orders were created for panel order COVID PRE-OP / PRE-PROCEDURE SCREENING ORDER (NO ISOLATION) - Swab, Nasopharynx.  Procedure                               Abnormality         Status                     ---------                               -----------         ------                     COVID-19,[facility]HOUSE...[[id number]]  Normal              Final result                 Please view results for these tests on the individual orders.    COVID-19,[facility]HOU[facility] CEPHEID, NP SWAB IN TRANSPORT MEDIA 8-12 HR TAT - Swab, Nasopharynx [[id number]]  (Normal) Collected: 04/13/21  0939    Specimen: Swab from Nasopharynx Updated: 04/13/21 1038     COVID19 Not Detected    Narrative:      Fact sheet for providers: https://www.fda.gov/media/952034/download     Fact sheet for patients: https://www.fda.gov/media/666570/download    POC Glucose Once [[id number]]  (Abnormal) Collected: 04/13/21 1024    Specimen: Blood Updated: 04/13/21 1027     Glucose 132 mg/dL     SCANNED - LABS [[id number]] Resulted: 04/13/21     Updated: 04/12/21 1545          XR Chest 2 View    Result Date: 3/30/2021  Narrative:  DATE OF EXAM: 3/30/2021 1:45 PM  PROCEDURE: XR CHEST 2 VW-  INDICATIONS: preop; M25.50-Pain in unspecified joint; Z01.818-Encounter for other preprocedural examination  COMPARISON: No Comparisons Available  TECHNIQUE: PA and lateral views of the chest were obtained.  FINDINGS: Heart size within normal limits. Mild tortuosity descending thoracic aorta. No focal consolidation, pneumothorax, or large pleural effusion. Multilevel disc narrowing the thoracic spine.      Impression: No acute process.  Electronically Signed By-Piotr Hayes MD On:3/30/2021 2:05 PM This report was finalized on [id number] by  Piotr Hayes MD.        Assessment:  Global Instability/Collateral Ligament Insufficiency Left Total Knee Arthroplasty    Plan:  Patient's pain is becoming disabling, despite extensive conservative treatment.  Radiographs reveal gross ligament insufficiency and global laxity.  The risks of surgery, including, but not limited to, heart attack, stroke, dying, DVT, nerve injury, vascular injury, arthrofibrosis and infection were discussed.  The alternatives and benefits were also discussed.  All questions answered and the patient wishes to proceed with left total knee arthroplasty revision.    Rahat NIÑOC  [facility] [place] [address]  [phone]    4/13/2021    CC: Nima Diop MD, Rahat Camargo MD

## 2021-04-13 NOTE — ANESTHESIA PROCEDURE NOTES
Peripheral Block    Pre-sedation assessment completed: 4/13/2021 12:20 PM    Patient reassessed immediately prior to procedure    Patient location during procedure: holding area  Start time: 4/13/2021 12:20 PM  Stop time: 4/13/2021 12:24 PM  Reason for block: at surgeon's request and post-op pain management  Performed by  Anesthesiologist: Shoaib Mcmillan MD  Preanesthetic Checklist  Completed: patient identified, IV checked, site marked, risks and benefits discussed, surgical consent, monitors and equipment checked, pre-op evaluation and timeout performed  Prep:  Sterile barriers:cap, gloves and mask  Prep: ChloraPrep  Patient monitoring: blood pressure monitoring, continuous pulse oximetry and EKG  Procedure  Sedation:yes  Performed under: local infiltration  Guidance:ultrasound guided  ULTRASOUND INTERPRETATION.  Using ultrasound guidance a 21 G gauge needle was placed in close proximity to the femoral nerve, at which point, under ultrasound guidance anesthetic was injected in the area of the nerve and spread of the anesthesia was seen on ultrasound in close proximity thereto.  There were no abnormalities seen on ultrasound; a digital image was taken; and the patient tolerated the procedure with no complications. Images:still images obtained    Laterality:left  Block Type:femoral and adductor canal block  Injection Technique:single-shot  Needle Type:echogenic  Resistance on Injection: none    Medications Used: ropivacaine (NAROPIN) 0.5 % injection, 30 mL      Post Assessment  Injection Assessment: negative aspiration for heme, no paresthesia on injection and incremental injection  Patient Tolerance:comfortable throughout block  Complications:no  Additional Notes  Ultrasound Interpretation:  Using ultrasound guidance the needle was placed in close proximity to the femoral nerve and anesthetic was injected in the area of the femoral nerve and spread of the anesthetic was seen on ultrasound in close proximity  thereto.  There were no abnormalities seen on ultrasound; a digital image was taken; and the patient tolerated the procedure with no complications.    Block placed for postoperative pain control per surgeon request.

## 2021-04-13 NOTE — ANESTHESIA PROCEDURE NOTES
Airway  Urgency: elective    Date/Time: 4/13/2021 1:00 PM  Airway not difficult    General Information and Staff    Patient location during procedure: OR  Anesthesiologist: Shoaib Mcmillan MD  CRNA: Divya Morris CRNA    Indications and Patient Condition  Indications for airway management: airway protection    Preoxygenated: yes  MILS maintained throughout  Mask difficulty assessment: 1 - vent by mask    Final Airway Details  Final airway type: endotracheal airway      Successful airway: ETT  Cuffed: yes   Successful intubation technique: direct laryngoscopy  Facilitating devices/methods: intubating stylet  Endotracheal tube insertion site: oral  Blade: Morgan  Blade size: 2  ETT size (mm): 7.0  Cormack-Lehane Classification: grade IIa - partial view of glottis  Placement verified by: chest auscultation and capnometry   Measured from: lips  Number of attempts at approach: 1  Assessment: lips, teeth, and gum same as pre-op and atraumatic intubation    Additional Comments  Atraumatic, Secured after verification of placement

## 2021-04-13 NOTE — ANESTHESIA POSTPROCEDURE EVALUATION
"Patient: Jenn Dupont    Procedure Summary     Date: 04/13/21 Room / Location: [facility] OR [address] [place] [address] [facility] OR    Anesthesia Start: 1240 Anesthesia Stop: 1632    Procedure: LEFT TOTAL KNEE ARTHROPLASTY REVISION HINGED (Left Knee) Diagnosis:     Surgeons: Rahat Camargo MD Provider: Hayde Solis MD    Anesthesia Type: general with block ASA Status: 3          Anesthesia Type: general with block    Vitals  Vitals Value Taken Time   /72 04/13/21 1900   Temp 36.5 °C (97.7 °F) 04/13/21 1626   Pulse 84 04/13/21 1912   Resp 16 04/13/21 1900   SpO2 89 % 04/13/21 1912   Vitals shown include unvalidated device data.        Post Anesthesia Care and Evaluation    Patient location during evaluation: bedside  Patient participation: complete - patient participated  Level of consciousness: awake and alert  Pain management: adequate  Airway patency: patent  Anesthetic complications: No anesthetic complications    Cardiovascular status: acceptable  Respiratory status: acceptable  Hydration status: acceptable    Comments: /72   Pulse 83   Temp 36.5 °C (97.7 °F) (Oral)   Resp 16   Ht 152.4 cm (60\")   Wt 110 kg (243 lb 6.2 oz)   SpO2 90%   BMI 47.53 kg/m²           "

## 2021-04-13 NOTE — ANESTHESIA PREPROCEDURE EVALUATION
Anesthesia Evaluation                  Airway   Mallampati: III  TM distance: >3 FB  Neck ROM: full  No difficulty expected  Dental          Pulmonary     breath sounds clear to auscultation  (+) pulmonary embolism, a smoker Current, asthma,wheezes,     ROS comment: PE 2009  Cardiovascular - normal exam    Patient on routine beta blocker    (+) hypertension, past MI  >12 months, CAD, cardiac stents hyperlipidemia,     ROS comment: Stent rca 2013.  MI 2013    Neuro/Psych  GI/Hepatic/Renal/Endo    (+)   diabetes mellitus type 2,     Musculoskeletal     Abdominal    Substance History      OB/GYN          Other                      Anesthesia Plan    ASA 3     general with block     intravenous induction     Anesthetic plan, all risks, benefits, and alternatives have been provided, discussed and informed consent has been obtained with: patient.

## 2021-04-13 NOTE — OP NOTE
Orthopaedic Surgery Operative Note    Patient Name:  Jenn Dupont  YOB: 1945  Age: 75 y.o.  Medical Records Number:  [id number]    Date of Procedure:  4/13/2021    Pre-operative Diagnosis:  Global Instability/Collateral Ligament Insufficiency Left Total Knee Arthroplasty    Post-operative Diagnosis:  Global Instability/Collateral Ligament Insufficiency Left Total Knee Arthroplasty    Procedure Performed:  Left Total Knee Arthroplasty Revision- All Components    Implants: Jabari/Biomet NexGen Rotating Hinge TKA Revision system, Size C Femoral Component with 5 mm distal augments M&L, with a 16 x 75 mm Sharp Fluted Stem, Size 4 RHK Tibial Tray with 10 mm Full Block Tibial Augment with a 15 x 30 mm Stubby Stem, 17 mm RHK Polyethylene Insert    Surgeon:  Rahat Camargo M.D.    Assistant: Arpita Tapia (who was present during the critical portions of the case, thereby decreasing operative time and patient morbidity)    Anesthetic Type:  General    Estimated Blood Loss:  250cc's    Specimens: * No orders in the log *    No Complications      Indications for Procedure:  Jenn Dupont is a 75 y.o. female suffering from aseptic loosing of their left total knee arthroplasty.  The patients pain is becoming disabling, despite extensive conservative care, including NSAIDS, therapy and bracing.  Infection work up is negative and there are obvious signs of implant loosening on radiographs and 3-phase bone scan.  The risks, benefits and alternatives were discussed and the patient wishes to proceed with left total knee arthroplasty revision of all components.      Procedure Performed:    After informed consent was obtained, the correct patient identified, the correct operative side marked by the operative surgeon and pre-operative IV antibiotics given (prior to tourniquet inflation,) the patient was taken to the operating room and placed supine on the operating table.  After general anesthesia was  induced, a surgical time out was performed and 1 gm of Tranxemic Acid given, a well padded tourniquet was placed and the patient's left lower extremity was prepped with ChloraPrep and draped in a sterile fashion.    A midline incision was made overlying the left knee and we sharply dissected down to expose the parapatellar retinaculum.  A muscle sparing, mid-vastus parapatellar arthrotomy was performed and we elevated the soft tissue both medially and laterally.  The medial and lateal gutters were reestablished.  We everted the patella and cleared all soft tissue surrounding the patellar component.  We thoroughly inspected the implant/cement/bone interfaces and there were no signs of implant loosening.  We protected the patella with the patella protector and turned our attention to the femur and the tibia.    We gained exposure of the femoral and tibial components and carefully removed the implants using Brooke osteotomes, flexible osteotomes and the oscillating saw.  Care was taken to remove the implants with as little bone loss as possible.  We then removed all soft tissue and cement debris from the bony surfaces, copiously irrigated the femoral and tibial canals and then began reaming the tibial and femoral canals.  We sequentially reamed the canals in 1 mm increments until we gained excellent interference fit.  We started with the 10 mm reamer and reamed up to 15 mm for the tibia and 16 mm for the femur.  Leaving the last reamer in the femoral canal, we used the 5 degree valgus cut block to freshen our distal femoral cut, removing 1 mm of bone.  Once we had a smooth surface, we measured the femur to be size C.  We assured we had rotational alignment using the epicondylar axis, then we used the four-in-one cut block to make our anterior, posterior, anterior and posterior chamfer cuts.  We then used the box cutting guide to remove our posterior cruciate ligament.  We placed our femoral trial, had excellent fit, and  "extended the knee and marked Oneal's line on the tibia.      We then turned our attention to the tibia, where we irrigated the canal again.  We used the reamer and the 0 degree posterior sloped cut block to remove 1 mm of bone from the effected side of the tibia.  Prior to making our cut, we assured we had rotational alignment using the external guide and [place]'s line.  Once we had a smooth surface, we measured the tibia to be size 4.  We then removed soft tissue and bony debris from the posterior aspect of the knee.    We placed our trial implants and had excellent fit, range of motion, stability and ligament balance, throughout a complete arc of motion with a 17 polyethylene liner.  In order to gain ligament balance, we trialed repetitively with different combinations of femoral and tibial augments until we were able to gain balanced flexion and extension gaps with 5 mm distal femoral augments, both medially and laterally, as well as a 10 mm tibial augment.  We removed our trial implants, punched the tibial keel, copiously irrigated the knee, then cemented/impacted our implants in place using Biomet cement.  Once the cement cured, we trialed again with the 14 and 17 mm RHK polyethylene liners.  The 17 gave us the best range of motion, stability and ligament balance, throughout a complete arc of motion.  We removed the trials, copiously irrigated the knee, gave IV antibiotics and local anesthetic, then placed our permanent polyethylene liner.  We placed a 1/8\" hemovac drain, then closed the arthrotomy with #1 Vicryl pop-off sutures.  The subcutaneous tissue was closed with 2-0 vicryl pop-off sutures.  The skin was closed with staples.  We placed a sterile dressing of Xeroform, 4x4's, abdominal pads, cast padding and an Ace Wrap.  The patient was then awakened from general anesthesia and taken to the recovery room in stable condition.    The patient will be started on Aspirin 325 mg twice daily for DVT " prophylaxis.  IV antibiotics will be discontinued within 24 hours of surgery.  Immediately prior to surgery, there were no acute Thromboembolic nor Cardiovascular risk factors.  An updated Medical Reconciliation form is on the chart.    Rahat Spence PA-C  [facility]  [address] [place], [place] [id number] [phone]    4/13/2021

## 2021-04-13 NOTE — PERIOPERATIVE NURSING NOTE
Informed  Dr Zhang of pt O2 sat 88%-placed on O2@2L-96%/last smoke early this am per pt/pt states hx of PE-noted in care everywhere-informed Dr Zhang and added to pt hx/

## 2021-04-13 NOTE — PERIOPERATIVE NURSING NOTE
Informed Dr Camargo of pt PE hx from 2009/Noted in [facility] everywhere

## 2021-04-14 LAB
ANION GAP SERPL CALCULATED.3IONS-SCNC: 8.5 MMOL/L (ref 5–15)
BUN SERPL-MCNC: 19 MG/DL (ref 8–23)
BUN/CREAT SERPL: 26.8 (ref 7–25)
CALCIUM SPEC-SCNC: 9 MG/DL (ref 8.6–10.5)
CHLORIDE SERPL-SCNC: 101 MMOL/L (ref 98–107)
CO2 SERPL-SCNC: 31.5 MMOL/L (ref 22–29)
CREAT SERPL-MCNC: 0.71 MG/DL (ref 0.57–1)
DEPRECATED RDW RBC AUTO: 50.2 FL (ref 37–54)
ERYTHROCYTE [DISTWIDTH] IN BLOOD BY AUTOMATED COUNT: 15.2 % (ref 12.3–15.4)
GFR SERPL CREATININE-BSD FRML MDRD: 98 ML/MIN/1.73
GLUCOSE BLDC GLUCOMTR-MCNC: 169 MG/DL (ref 70–130)
GLUCOSE BLDC GLUCOMTR-MCNC: 181 MG/DL (ref 70–130)
GLUCOSE BLDC GLUCOMTR-MCNC: 261 MG/DL (ref 70–130)
GLUCOSE BLDC GLUCOMTR-MCNC: 292 MG/DL (ref 70–130)
GLUCOSE SERPL-MCNC: 255 MG/DL (ref 65–99)
HCT VFR BLD AUTO: 45.4 % (ref 34–46.6)
HGB BLD-MCNC: 14 G/DL (ref 12–15.9)
MCH RBC QN AUTO: 27.7 PG (ref 26.6–33)
MCHC RBC AUTO-ENTMCNC: 30.8 G/DL (ref 31.5–35.7)
MCV RBC AUTO: 89.9 FL (ref 79–97)
PLATELET # BLD AUTO: 214 10*3/MM3 (ref 140–450)
PMV BLD AUTO: 10.5 FL (ref 6–12)
POTASSIUM SERPL-SCNC: 4.3 MMOL/L (ref 3.5–5.2)
QT INTERVAL: 437 MS
RBC # BLD AUTO: 5.05 10*6/MM3 (ref 3.77–5.28)
SODIUM SERPL-SCNC: 141 MMOL/L (ref 136–145)
WBC # BLD AUTO: 10.48 10*3/MM3 (ref 3.4–10.8)

## 2021-04-14 PROCEDURE — 82962 GLUCOSE BLOOD TEST: CPT

## 2021-04-14 PROCEDURE — 80048 BASIC METABOLIC PNL TOTAL CA: CPT | Performed by: ORTHOPAEDIC SURGERY

## 2021-04-14 PROCEDURE — G0378 HOSPITAL OBSERVATION PER HR: HCPCS

## 2021-04-14 PROCEDURE — 94799 UNLISTED PULMONARY SVC/PX: CPT

## 2021-04-14 PROCEDURE — 25010000002 FUROSEMIDE PER 20 MG: Performed by: INTERNAL MEDICINE

## 2021-04-14 PROCEDURE — 94640 AIRWAY INHALATION TREATMENT: CPT

## 2021-04-14 PROCEDURE — 97110 THERAPEUTIC EXERCISES: CPT

## 2021-04-14 PROCEDURE — 85027 COMPLETE CBC AUTOMATED: CPT | Performed by: ORTHOPAEDIC SURGERY

## 2021-04-14 PROCEDURE — 93010 ELECTROCARDIOGRAM REPORT: CPT | Performed by: INTERNAL MEDICINE

## 2021-04-14 PROCEDURE — 97162 PT EVAL MOD COMPLEX 30 MIN: CPT

## 2021-04-14 PROCEDURE — 63710000001 INSULIN LISPRO (HUMAN) PER 5 UNITS: Performed by: HOSPITALIST

## 2021-04-14 RX ORDER — NICOTINE POLACRILEX 4 MG
15 LOZENGE BUCCAL
Status: DISCONTINUED | OUTPATIENT
Start: 2021-04-14 | End: 2021-04-19 | Stop reason: HOSPADM

## 2021-04-14 RX ORDER — POTASSIUM CHLORIDE 1.5 G/1.77G
40 POWDER, FOR SOLUTION ORAL ONCE
Status: COMPLETED | OUTPATIENT
Start: 2021-04-14 | End: 2021-04-14

## 2021-04-14 RX ORDER — SODIUM CHLORIDE FOR INHALATION 7 %
4 VIAL, NEBULIZER (ML) INHALATION
Status: DISCONTINUED | OUTPATIENT
Start: 2021-04-14 | End: 2021-04-19 | Stop reason: HOSPADM

## 2021-04-14 RX ORDER — INSULIN LISPRO 100 [IU]/ML
0-7 INJECTION, SOLUTION INTRAVENOUS; SUBCUTANEOUS
Status: DISCONTINUED | OUTPATIENT
Start: 2021-04-14 | End: 2021-04-19 | Stop reason: HOSPADM

## 2021-04-14 RX ORDER — ACETAMINOPHEN 325 MG/1
650 TABLET ORAL EVERY 6 HOURS PRN
Status: DISCONTINUED | OUTPATIENT
Start: 2021-04-14 | End: 2021-04-19 | Stop reason: HOSPADM

## 2021-04-14 RX ORDER — FUROSEMIDE 10 MG/ML
40 INJECTION INTRAMUSCULAR; INTRAVENOUS ONCE
Status: COMPLETED | OUTPATIENT
Start: 2021-04-14 | End: 2021-04-14

## 2021-04-14 RX ORDER — DEXTROSE MONOHYDRATE 25 G/50ML
25 INJECTION, SOLUTION INTRAVENOUS
Status: DISCONTINUED | OUTPATIENT
Start: 2021-04-14 | End: 2021-04-19 | Stop reason: HOSPADM

## 2021-04-14 RX ORDER — IPRATROPIUM BROMIDE AND ALBUTEROL SULFATE 2.5; .5 MG/3ML; MG/3ML
3 SOLUTION RESPIRATORY (INHALATION)
Status: DISCONTINUED | OUTPATIENT
Start: 2021-04-14 | End: 2021-04-19 | Stop reason: HOSPADM

## 2021-04-14 RX ADMIN — IPRATROPIUM BROMIDE AND ALBUTEROL SULFATE 3 ML: 2.5; .5 SOLUTION RESPIRATORY (INHALATION) at 19:42

## 2021-04-14 RX ADMIN — FUROSEMIDE 40 MG: 10 INJECTION, SOLUTION INTRAVENOUS at 00:50

## 2021-04-14 RX ADMIN — MUPIROCIN 1 APPLICATION: 20 OINTMENT TOPICAL at 20:34

## 2021-04-14 RX ADMIN — INSULIN LISPRO 4 UNITS: 100 INJECTION, SOLUTION INTRAVENOUS; SUBCUTANEOUS at 08:20

## 2021-04-14 RX ADMIN — CLINDAMYCIN PHOSPHATE 900 MG: 900 INJECTION, SOLUTION INTRAVENOUS at 04:44

## 2021-04-14 RX ADMIN — TRIAMTERENE AND HYDROCHLOROTHIAZIDE 1 TABLET: 75; 50 TABLET ORAL at 08:20

## 2021-04-14 RX ADMIN — ATORVASTATIN CALCIUM 20 MG: 20 TABLET, FILM COATED ORAL at 08:20

## 2021-04-14 RX ADMIN — VILAZODONE HYDROCHLORIDE 40 MG: 40 TABLET ORAL at 08:20

## 2021-04-14 RX ADMIN — OXYCODONE HYDROCHLORIDE AND ACETAMINOPHEN 2 TABLET: 5; 325 TABLET ORAL at 18:30

## 2021-04-14 RX ADMIN — Medication 4 ML: at 19:42

## 2021-04-14 RX ADMIN — POTASSIUM CHLORIDE 10 MEQ: 750 TABLET, EXTENDED RELEASE ORAL at 08:20

## 2021-04-14 RX ADMIN — OXYCODONE HYDROCHLORIDE AND ACETAMINOPHEN 1 TABLET: 5; 325 TABLET ORAL at 14:36

## 2021-04-14 RX ADMIN — ACETAMINOPHEN 650 MG: 325 TABLET, FILM COATED ORAL at 11:06

## 2021-04-14 RX ADMIN — POTASSIUM CHLORIDE 40 MEQ: 1.5 POWDER, FOR SOLUTION ORAL at 02:05

## 2021-04-14 RX ADMIN — SODIUM CHLORIDE, PRESERVATIVE FREE 3 ML: 5 INJECTION INTRAVENOUS at 08:21

## 2021-04-14 RX ADMIN — ASPIRIN 325 MG: 325 TABLET, COATED ORAL at 08:20

## 2021-04-14 RX ADMIN — ASPIRIN 325 MG: 325 TABLET, COATED ORAL at 17:28

## 2021-04-14 RX ADMIN — FERROUS SULFATE TAB 325 MG (65 MG ELEMENTAL FE) 325 MG: 325 (65 FE) TAB at 08:20

## 2021-04-14 RX ADMIN — OXYCODONE HYDROCHLORIDE AND ACETAMINOPHEN 1 TABLET: 5; 325 TABLET ORAL at 13:38

## 2021-04-14 RX ADMIN — INSULIN LISPRO 4 UNITS: 100 INJECTION, SOLUTION INTRAVENOUS; SUBCUTANEOUS at 11:35

## 2021-04-14 RX ADMIN — ALLOPURINOL 100 MG: 100 TABLET ORAL at 08:20

## 2021-04-14 RX ADMIN — SODIUM CHLORIDE, PRESERVATIVE FREE 3 ML: 5 INJECTION INTRAVENOUS at 20:34

## 2021-04-14 RX ADMIN — METOPROLOL TARTRATE 25 MG: 25 TABLET, FILM COATED ORAL at 20:33

## 2021-04-14 RX ADMIN — INSULIN LISPRO 4 UNITS: 100 INJECTION, SOLUTION INTRAVENOUS; SUBCUTANEOUS at 17:28

## 2021-04-14 NOTE — PLAN OF CARE
Goal Outcome Evaluation:  Plan of Care Reviewed With: patient  Progress: improving  Outcome Summary: patient admitted from PACU last night, 4L 02 sats 89%-90%. needed to increase 02 to 6 L highflow to maintain 02 sats 90%. [facility] and pulmonary consulted, Chest x-ry with mild CHF, ABG's with elevated CO2. Lasix 40mg IV x's one. pure wick, output over 1100cc. 02 now at 3.5 liters 92%. patient more awake, to use narcotics sparingly, troponin ekg normal,

## 2021-04-14 NOTE — THERAPY EVALUATION
Patient Name: Jenn Dupont  : 1945    MRN: [id number]                              Today's Date: 2021       Admit Date: 2021    Visit Dx:     ICD-10-CM ICD-9-CM   1. Mechanical loosening of prosthetic knee, initial encounter (CMS/[facility])  T84.038A 996.41    Z96.659 V43.65     Patient Active Problem List   Diagnosis   • Aseptic loosening of prosthetic knee (CMS/[facility])   • DJD (degenerative joint disease)     Past Medical History:   Diagnosis Date   • Asthma     ALLERGY INDUCED    • Coronary artery disease    • Diabetes mellitus (CMS/[facility])    • Elevated cholesterol    • Hypertension    • Knee pain, left    • Low back pain    • MI (myocardial infarction) (CMS/[facility])    • Pulmonary embolism (CMS/HCC)        • Shoulder pain, left    • Urinary frequency      Past Surgical History:   Procedure Laterality Date   • CHOLECYSTECTOMY     • CORONARY ANGIOPLASTY WITH STENT PLACEMENT     • HYSTERECTOMY     • TONSILLECTOMY     • TOTAL KNEE ARTHROPLASTY Left    • TOTAL KNEE ARTHROPLASTY Right      General Information     Row Name 21 1152          Physical Therapy Time and Intention    Document Type  evaluation  -DJ     Mode of Treatment  individual therapy;physical therapy  -DJ     Row Name 21 1152          General Information    Patient Profile Reviewed  yes  -DJ     Prior Level of Function  independent:;all household mobility;ADL's  -DJ     Existing Precautions/Restrictions  fall  -DJ     Barriers to Rehab  medically complex;previous functional deficit  -DJ     Row Name 21 1152          Living Environment    Lives With  alone  -DJ     Row Name 21 1152          Home Main Entrance    Number of Stairs, Main Entrance  none  -DJ     Row Name 21 1152          Stairs Within Home, Primary    Number of Stairs, Within Home, Primary  none  -DJ     Row Name 21 1152          Cognition    Orientation Status (Cognition)  oriented x 4 Pleasant and cooperative but emotionally  labile  -DJ     Row Name 04/14/21 1152          Safety Issues, Functional Mobility    Impairments Affecting Function (Mobility)  endurance/activity tolerance;pain;strength  -DJ     Comment, Safety Issues/Impairments (Mobility)  gt belt, grippy socks  -DJ       User Key  (r) = Recorded By, (t) = Taken By, (c) = Cosigned By    Initials Name Provider Type    Marilee Giordano, PT Physical Therapist        Mobility     Row Name 04/14/21 1153          Bed Mobility    Bed Mobility  supine-sit  -DJ     Supine-Sit [place] (Bed Mobility)  contact guard;verbal cues  -DJ     Assistive Device (Bed Mobility)  bed rails;head of bed elevated  -DJ     Comment (Bed Mobility)  Pt placed in recliner chair after PT  -DJ     Row Name 04/14/21 1153          Transfers    Comment (Transfers)  sit/stand from EOB x 2  -DJ     Row Name 04/14/21 1153          Bed-Chair Transfer    Bed-Chair [place] (Transfers)  minimum assist (75% patient effort);contact guard;2 person assist;verbal cues  -DJ     Assistive Device (Bed-Chair Transfers)  walker, front-wheeled  -DJ     Row Name 04/14/21 1153          Sit-Stand Transfer    Sit-Stand [place] (Transfers)  contact guard;2 person assist;verbal cues  -DJ     Assistive Device (Sit-Stand Transfers)  walker, front-wheeled  -DJ     Row Name 04/14/21 1153          Gait/Stairs (Locomotion)    [place] Level (Gait)  contact guard;minimum assist (75% patient effort);2 person assist;verbal cues  -DJ     Assistive Device (Gait)  walker, front-wheeled  -DJ     Distance in Feet (Gait)  2-3 very small steps from bed to chair  -DJ     Deviations/Abnormal Patterns (Gait)  antalgic;festinating/shuffling;stride length decreased  -DJ     Bilateral Gait Deviations  forward flexed posture  -DJ     [place] Level (Stairs)  not tested  -DJ     Comment (Gait/Stairs)  On first attempt, pt states she could not walk. Pt became tearful and stateed that she is never going to be able to walk again. On 2nd  attempt, pt was albe to take a few very small steps from EOB to recliner chair with CGA/min A of 2. Pt R knee buckled x 1 but she was able to maintain standing posture. Pt placed in recliner chair and again became very upset and tearful when PT attempted to leave the room.  -DJ       User Key  (r) = Recorded By, (t) = Taken By, (c) = Cosigned By    Initials Name Provider Type    Marilee Giordano, PT Physical Therapist        Obj/Interventions     Row Name 04/14/21 1200          Range of Motion Comprehensive    General Range of Motion  no range of motion deficits identified  -DJ     Comment, General Range of Motion  L knee grossly -10 exten, 80 flex (AAROM)  -DJ     Row Name 04/14/21 1200          Strength Comprehensive (MMT)    Comment, General Manual Muscle Testing (MMT) Assessment  Good extremity strength,  L LE grossly 3+/5  -DJ     Row Name 04/14/21 1200          Motor Skills    Motor Skills  functional endurance  -DJ     Functional Endurance  fatigues quickly  -DJ     Therapeutic Exercise  -- AP, QS, GS, heel slides with belt  -DJ     Row Name 04/14/21 1200          Balance    Balance Assessment  sitting static balance;standing static balance  -DJ     Static Sitting Balance  WNL  -DJ     Static Standing Balance  mild impairment  -DJ     Balance Interventions  standing;weight shifting activity  -DJ     Comment, Balance  L LE aura with WBing  -DJ     Row Name 04/14/21 1200          Sensory Assessment (Somatosensory)    Sensory Assessment (Somatosensory)  sensation intact  -DJ       User Key  (r) = Recorded By, (t) = Taken By, (c) = Cosigned By    Initials Name Provider Type    Marilee Giordano, PT Physical Therapist        Goals/Plan     Row Name 04/14/21 1210          Bed Mobility Goal 1 (PT)    Activity/Assistive Device (Bed Mobility Goal 1, PT)  sit to supine;supine to sit  -DJ     [place] Level/Cues Needed (Bed Mobility Goal 1, PT)  supervision required  -DJ     Time Frame (Bed Mobility Goal 1, PT)  5  days  -DJ     Row Name 04/14/21 1210          Transfer Goal 1 (PT)    Activity/Assistive Device (Transfer Goal 1, PT)  sit-to-stand/stand-to-sit;toilet  -DJ     [place] Level/Cues Needed (Transfer Goal 1, PT)  supervision required  -DJ     Time Frame (Transfer Goal 1, PT)  5 days  -DJ     Row Name 04/14/21 1210          Gait Training Goal 1 (PT)    Activity/Assistive Device (Gait Training Goal 1, PT)  gait (walking locomotion);assistive device use;improve balance and speed;increase endurance/gait distance;increase energy conservation;walker, rolling  -DJ     [place] Level (Gait Training Goal 1, PT)  verbal cues required;contact guard assist  -DJ     Distance (Gait Training Goal 1, PT)  >150'  -DJ     Time Frame (Gait Training Goal 1, PT)  5 days  -DJ     Row Name 04/14/21 1210          ROM Goal 1 (PT)    ROM Goal 1 (PT)  L knee -5 to 95  -DJ     Time Frame (ROM Goal 1, PT)  5 days  -DJ     Row Name 04/14/21 1210          Patient Education Goal (PT)    Activity (Patient Education Goal, PT)  HEP  -DJ     [place]/Cues/Accuracy (Memory Goal 2, PT)  demonstrates adequately;verbalizes understanding  -DJ     Time Frame (Patient Education Goal, PT)  3 days  -DJ       User Key  (r) = Recorded By, (t) = Taken By, (c) = Cosigned By    Initials Name Provider Type    Marilee Giordano, PT Physical Therapist        Clinical Impression     Row Name 04/14/21 1202          Pain    Additional Documentation  Pain Scale: Numbers Pre/Post-Treatment (Group)  -DJ     Row Name 04/14/21 1202          Pain Scale: Numbers Pre/Post-Treatment    Pretreatment Pain Rating  7/10  -DJ     Posttreatment Pain Rating  7/10  -DJ     Pain Location - Side  Left  -DJ     Pain Location - Orientation  incisional  -DJ     Pain Location  knee  -DJ     Pain Intervention(s)  Repositioned;Rest  -DJ     Row Name 04/14/21 1202          Plan of Care Review    Plan of Care Reviewed With  patient  -DJ     Outcome Summary  76yo very pleasant obese female  admitted 4/13/21 due to mechanical loosening L TKA prosthesis; now s/p L TK revision. PMH includes asthma, CAD, DM, high cholesterol, htn, MI, L sh pain, mild CHF, ?MYRNA, A/C resp failure psot-operatively. PLOF: Pt lives alone in senior community that offers PT. She used a cane for amb and was independent with ADLs. Pt is primarily limited at this time by sig c/o pain, mod decreased L knee AROM, decreased L LE strength - all of which limit her functional mobility including amb and transfers. Today, she req CGA for bed mobility and sit/stand transfer from EOB. She was only able to take a few very small steps from bed to recliner with CGA/min A of 2. L LE did buckle on 1 occasion; she was instructed in TKA HEP.. She would certainly benefit from skilled PT acutely for ther ex, A/AA/PROM, gt/transfer training, bed mobility, balance, and activity tolerance as appropriate. On 2 occasions, pt became very emotionally labile and began crying (stated that she is never going to be able to walk again, and then she became upset/tearful when PT attempted to leave room). Pt not safe to return home at this time - she may require short rehab stay pending progress over the next few days.  -DJ     Row Name 04/14/21 1202          Therapy Assessment/Plan (PT)    Patient/Family Therapy Goals Statement (PT)  return home  -DJ     Rehab Potential (PT)  good, to achieve stated therapy goals  -DJ     Criteria for Skilled Interventions Met (PT)  yes;meets criteria  -DJ     Row Name 04/14/21 1202          Vital Signs    Pre SpO2 (%)  96  -DJ     O2 Delivery Pre Treatment  supplemental O2 .5L  -DJ     O2 Delivery Intra Treatment  room air  -DJ     O2 Delivery Post Treatment  supplemental O2 .5L  -DJ     Pre Patient Position  Supine  -DJ     Intra Patient Position  Standing  -DJ     Post Patient Position  Sitting  -DJ     Row Name 04/14/21 1202          Positioning and Restraints    Pre-Treatment Position  in bed  -DJ     Post Treatment Position   chair  -DJ     In Chair  notified nsg;reclined;call light within reach;encouraged to call for assist;exit alarm on  -DJ       User Key  (r) = Recorded By, (t) = Taken By, (c) = Cosigned By    Initials Name Provider Type    Marilee Giordano, JOVAN Physical Therapist        Outcome Measures     Row Name 04/14/21 1212          How much help from another person do you currently need...    Turning from your back to your side while in flat bed without using bedrails?  3  -DJ     Moving from lying on back to sitting on the side of a flat bed without bedrails?  3  -DJ     Moving to and from a bed to a chair (including a wheelchair)?  3  -DJ     Standing up from a chair using your arms (e.g., wheelchair, bedside chair)?  3  -DJ     Climbing 3-5 steps with a railing?  2  -DJ     To walk in hospital room?  2  -DJ     AM-PAC 6 Clicks Score (PT)  16  -DJ     Row Name 04/14/21 1212          Functional Assessment    Outcome Measure Options  AM-PAC 6 Clicks Basic Mobility (PT)  -DJ       User Key  (r) = Recorded By, (t) = Taken By, (c) = Cosigned By    Initials Name Provider Type    Marilee Giordano, JOVAN Physical Therapist        Physical Therapy Education                 Title: PT OT SLP Therapies (Done)     Topic: Physical Therapy (Done)     Point: Mobility training (Done)     Learning Progress Summary           Patient Acceptance, TB, DU,NR by NIKA at 4/14/2021 1212                   Point: Home exercise program (Done)     Learning Progress Summary           Patient Acceptance, TB, DU,NR by NIKA at 4/14/2021 1212                   Point: Body mechanics (Done)     Learning Progress Summary           Patient Acceptance, TB, DU,NR by  at 4/14/2021 1212                   Point: Precautions (Done)     Learning Progress Summary           Patient Acceptance, TB, DU,NR by  at 4/14/2021 1212                               User Key     Initials Effective Dates Name Provider Type Jazmin MAHER 10/25/19 -  Marilee Jiménez, JOVAN Physical Therapist PT               PT Recommendation and Plan  Planned Therapy Interventions (PT): balance training, bed mobility training, gait training, home exercise program, strengthening, ROM (range of motion), postural re-education, patient/family education, transfer training  Plan of Care Reviewed With: patient  Outcome Summary: 76yo very pleasant obese female admitted 4/13/21 due to mechanical loosening L TKA prosthesis; now s/p L TK revision. PMH includes asthma, CAD, DM, high cholesterol, htn, MI, L sh pain, mild CHF, ?MYRNA, A/C resp failure psot-operatively. PLOF: Pt lives alone in senior community that offers PT. She used a cane for amb and was independent with ADLs. Pt is primarily limited at this time by sig c/o pain, mod decreased L knee AROM, decreased L LE strength - all of which limit her functional mobility including amb and transfers. Today, she req CGA for bed mobility and sit/stand transfer from EOB. She was only able to take a few very small steps from bed to recliner with CGA/min A of 2. L LE did buckle on 1 occasion; she was instructed in TKA HEP.. She would certainly benefit from skilled PT acutely for ther ex, A/AA/PROM, gt/transfer training, bed mobility, balance, and activity tolerance as appropriate. On 2 occasions, pt became very emotionally labile and began crying (stated that she is never going to be able to walk again, and then she became upset/tearful when PT attempted to leave room). Pt not safe to return home at this time - she may require short rehab stay pending progress over the next few days.     Time Calculation:   PT Charges     Row Name 04/14/21 1213             Time Calculation    Start Time  1036  -DJ      Stop Time  1104  -DJ      Time Calculation (min)  28 min  -DJ      PT Non-Billable Time (min)  15 min  -DJ      PT Received On  04/14/21  -DJ      PT - Next Appointment  04/15/21  -DJ      PT Goal Re-Cert Due Date  04/19/21  -DJ        User Key  (r) = Recorded By, (t) = Taken By, (c) =  Cosigned By    Initials Name Provider Type    Marilee Giordano, PT Physical Therapist        Therapy Charges for Today     Code Description Service Date Service Provider Modifiers Qty    73571754690 HC PT EVAL MOD COMPLEXITY 2 4/14/2021 Marilee Jiménez, PT GP 1    31738409919 HC PT THER PROC EA 15 MIN 4/14/2021 Marilee Jiménez, PT GP 2    74282159470 HC PT THER SUPP EA 15 MIN 4/14/2021 Marilee Jiménez, PT GP 1          PT G-Codes  Outcome Measure Options: AM-PAC 6 Clicks Basic Mobility (PT)  AM-PAC 6 Clicks Score (PT): 16    Marilee Jiménez, PT  4/14/2021

## 2021-04-14 NOTE — PLAN OF CARE
Goal Outcome Evaluation:  Plan of Care Reviewed With: patient     Outcome Summary: 74yo very pleasant obese female admitted 4/13/21 due to mechanical loosening L TKA prosthesis; now s/p L TK revision. PMH includes asthma, CAD, DM, high cholesterol, htn, MI, L sh pain, mild CHF, ?MYRNA, A/C resp failure psot-operatively. PLOF: Pt lives alone in senior community that offers PT. She used a cane for amb and was independent with ADLs. Pt is primarily limited at this time by sig c/o pain, mod decreased L knee AROM, decreased L LE strength - all of which limit her functional mobility including amb and transfers. Today, she req CGA for bed mobility and sit/stand transfer from EOB. She was only able to take a few very small steps from bed to recliner with CGA/min A of 2. L LE did buckle on 1 occasion; she was instructed in TKA HEP.. She would certainly benefit from skilled PT acutely for ther ex, A/AA/PROM, gt/transfer training, bed mobility, balance, and activity tolerance as appropriate. On 2 occasions, pt became very emotionally labile and began crying (stated that she is never going to be able to walk again, and then she became upset/tearful when PT attempted to leave room). Pt not safe to return home at this time - she may require short rehab stay pending progress over the next few days.  Patient was intermittently wearing a face mask during this therapy encounter. Therapist used appropriate personal protective equipment including eye protection, mask, and gloves.  Mask used was standard procedure mask. Appropriate PPE was worn during the entire therapy session. Hand hygiene was completed before and after therapy session. Patient is not in enhanced droplet precautions.  PT Tech Flroy

## 2021-04-14 NOTE — PROGRESS NOTES
Orthopaedic Surgery  Progress Note  4/14/2021    Patients Name:  Jenn Dupont  YOB: 1945  Age: 75 y.o.  Medical Records Number:  [id number]  Date of Admission: 4/13/2021    No complaints except confusion.     Vitals:  Vitals:    04/14/21 0100 04/14/21 0129 04/14/21 0324 04/14/21 0409   BP:   118/74    BP Location:   Left arm    Patient Position:   Lying    Pulse:   78    Resp:   16    Temp:   98 °F (36.7 °C)    TempSrc:   Oral    SpO2: 91% 91% 95% 92%   Weight:       Height:           LLE:  NVI, calf nontender, Sensation intact  No signs of DVT    Incision: clean, no signs of infection    Lab Results (last 24 hours)     Procedure Component Value Units Date/Time    Blood Gas, Arterial - [[id number]]  (Abnormal) Collected: 04/13/21 2332    Specimen: Arterial Blood Updated: 04/13/21 2337     Site Arterial: right radial     Moy's Test Positive     pH, Arterial 7.327 pH units      pCO2, Arterial 57.8 mm Hg      Comment: Critical:Notify RAMIREZ GONZALEZ (13-Apr-21 23:35:44)Read back ok        pO2, Arterial 87.2 mm Hg      HCO3, Arterial 30.2 mmol/L      Base Excess, Arterial 2.6 mmol/L      O2 Saturation Calculated 95.6 %      Barometric Pressure for Blood Gas 751.8 mmHg      Modality HFNC     Flow Rate 6 lpm      Rate 18 Breaths/minute     BNP [[id number]]  (Normal) Collected: 04/13/21 2240    Specimen: Blood Updated: 04/13/21 2330     proBNP 125.2 pg/mL     Narrative:      Among patients with dyspnea, NT-proBNP is highly sensitive for the detection of acute congestive heart failure. In addition NT-proBNP of <300 pg/ml effectively rules out acute congestive heart failure with 99% negative predictive value.    Results may be falsely decreased if patient taking Biotin.      Troponin [[id number]]  (Normal) Collected: 04/13/21 2240    Specimen: Blood Updated: 04/13/21 2330     Troponin T <0.010 ng/mL     Narrative:      Troponin T Reference Range:  <= 0.03 ng/mL-   Negative for AMI  >0.03 ng/mL-      Abnormal for myocardial necrosis.  Clinicians would have to utilize clinical acumen, EKG, Troponin and serial changes to determine if it is an Acute Myocardial Infarction or myocardial injury due to an underlying chronic condition.       Results may be falsely decreased if patient taking Biotin.      Comprehensive Metabolic Panel [[id number]]  (Abnormal) Collected: 04/13/21 2240    Specimen: Blood Updated: 04/13/21 2325     Glucose 212 mg/dL      BUN 21 mg/dL      Creatinine 0.68 mg/dL      Sodium 138 mmol/L      Potassium 3.5 mmol/L      Chloride 98 mmol/L      CO2 30.8 mmol/L      Calcium 8.8 mg/dL      Total Protein 6.7 g/dL      Albumin 3.50 g/dL      ALT (SGPT) 60 U/L      AST (SGOT) 77 U/L      Alkaline Phosphatase 122 U/L      Total Bilirubin 0.4 mg/dL      eGFR  African Amer 103 mL/min/1.73      Globulin 3.2 gm/dL      A/G Ratio 1.1 g/dL      BUN/Creatinine Ratio 30.9     Anion Gap 9.2 mmol/L     Narrative:      GFR Normal >60  Chronic Kidney Disease <60  Kidney Failure <15      CBC & Differential [[id number]]  (Abnormal) Collected: 04/13/21 2240    Specimen: Blood Updated: 04/13/21 2320    Narrative:      The following orders were created for panel order CBC & Differential.  Procedure                               Abnormality         Status                     ---------                               -----------         ------                     CBC Auto Differential[[id number]]        Abnormal            Final result                 Please view results for these tests on the individual orders.    CBC Auto Differential [[id number]]  (Abnormal) Collected: 04/13/21 2240    Specimen: Blood Updated: 04/13/21 2320     WBC 8.26 10*3/mm3      RBC 5.09 10*6/mm3      Hemoglobin 13.6 g/dL      Hematocrit 43.1 %      MCV 84.7 fL      MCH 26.7 pg      MCHC 31.6 g/dL      RDW 14.9 %      RDW-SD 45.3 fl      MPV 10.6 fL      Platelets 231 10*3/mm3      Neutrophil % 93.3 %      Lymphocyte % 4.1 %      Monocyte % 2.1 %       Eosinophil % 0.0 %      Basophil % 0.1 %      Immature Grans % 0.4 %      Neutrophils, Absolute 7.71 10*3/mm3      Lymphocytes, Absolute 0.34 10*3/mm3      Monocytes, Absolute 0.17 10*3/mm3      Eosinophils, Absolute 0.00 10*3/mm3      Basophils, Absolute 0.01 10*3/mm3      Immature Grans, Absolute 0.03 10*3/mm3      nRBC 0.0 /100 WBC     POC Glucose Once [[id number]]  (Abnormal) Collected: 04/13/21 2114    Specimen: Blood Updated: 04/13/21 2135     Glucose 207 mg/dL     POC Glucose Once [[id number]]  (Abnormal) Collected: 04/13/21 1643    Specimen: Blood Updated: 04/13/21 1649     Glucose 205 mg/dL     COVID PRE-OP / PRE-PROCEDURE SCREENING ORDER (NO ISOLATION) - Swab, Nasopharynx [[id number]]  (Normal) Collected: 04/13/21 0939    Specimen: Swab from Nasopharynx Updated: 04/13/21 1038    Narrative:      The following orders were created for panel order COVID PRE-OP / PRE-PROCEDURE SCREENING ORDER (NO ISOLATION) - Swab, Nasopharynx.  Procedure                               Abnormality         Status                     ---------                               -----------         ------                     COVID-19,[facility]...[[id number]]  Normal              Final result                 Please view results for these tests on the individual orders.    COVID-19,[facility] CEPHEID, NP SWAB IN TRANSPORT MEDIA 8-12 HR TAT - Swab, Nasopharynx [[id number]]  (Normal) Collected: 04/13/21 0939    Specimen: Swab from Nasopharynx Updated: 04/13/21 1038     COVID19 Not Detected    Narrative:      Fact sheet for providers: https://www.fda.gov/media/544627/download     Fact sheet for patients: https://www.fda.gov/media/956573/download          XR Chest 2 View    Result Date: 3/30/2021  Narrative:  DATE OF EXAM: 3/30/2021 1:45 PM  PROCEDURE: XR CHEST 2 VW-  INDICATIONS: preop; M25.50-Pain in unspecified joint; Z01.818-Encounter for other preprocedural examination  COMPARISON: No Comparisons Available  TECHNIQUE: PA and  lateral views of the chest were obtained.  FINDINGS: Heart size within normal limits. Mild tortuosity descending thoracic aorta. No focal consolidation, pneumothorax, or large pleural effusion. Multilevel disc narrowing the thoracic spine.      Impression: No acute process.  Electronically Signed By-Piotr Hayes MD On:3/30/2021 2:05 PM This report was finalized on [id number] by  Piotr Hayes MD.    XR Knee 1 or 2 View Left    Result Date: 4/13/2021  Narrative: EXAMINATION: 2 VIEWS OF THE LEFT KNEE  HISTORY: 75-year-old female status post total left knee arthroplasty  FINDINGS: AP and lateral radiographs of the left knee were obtained. Comparison is made to prior left knee radiographs dated 06/23/2015. There has been interval revision of the left knee arthroplasty components with placement of femoral and tibial components that contain interfemoral shafts. Overlying skin staples are noted. There is no evidence for a fracture. Gas is seen within the surrounding soft tissues.      Impression: Status post recent revision of total left knee arthroplasty without evidence for an acute abnormality.  This report was finalized on 4/13/2021 8:51 PM by Dr. Shaka Montero M.D.      XR Chest 1 View    Result Date: 4/13/2021  Narrative: Patient: YOBANI BLOUNT  Time Out: 22:53 Exam(s): FILM CXR 1 VIEW EXAM:   XR Chest, 1 View CLINICAL HISTORY:    hypoxia  Reason for exam: hypoxia. TECHNIQUE:   Frontal view of the chest. COMPARISON:   2 1 2016. FINDINGS:   Lungs:  Diffuse bilateral lung opacities with subpleural fissural thickening.   Pleural space:  No pneumothorax or significant pleural effusions.   Heart:  Mild cardiomegaly.   Mediastinum:  Unremarkable.   Bones joints:  Multilevel degenerative changes of the thoracic spine.   Vasculature:  Calcified thoracic aorta. IMPRESSION:       Mild CHF.     Impression: Electronically signed by Joseph Peña M.D. on 04-13-21 at 2253    Peripheral Block    Result Date:  4/13/2021  Narrative: Shoaib Mcmillan MD     4/13/2021 12:24 PM Peripheral Block Pre-sedation assessment completed: 4/13/2021 12:20 PM Patient reassessed immediately prior to procedure Patient location during procedure: holding area Start time: 4/13/2021 12:20 PM Stop time: 4/13/2021 12:24 PM Reason for block: at surgeon's request and post-op pain management Performed by Anesthesiologist: Shoaib Mcmillan MD Preanesthetic Checklist Completed: patient identified, IV checked, site marked, risks and benefits discussed, surgical consent, monitors and equipment checked, pre-op evaluation and timeout performed Prep: Sterile barriers:cap, gloves and mask Prep: ChloraPrep Patient monitoring: blood pressure monitoring, continuous pulse oximetry and EKG Procedure Sedation:yes Performed under: local infiltration Guidance:ultrasound guided ULTRASOUND INTERPRETATION.  Using ultrasound guidance a 21 G gauge needle was placed in close proximity to the femoral nerve, at which point, under ultrasound guidance anesthetic was injected in the area of the nerve and spread of the anesthesia was seen on ultrasound in close proximity thereto.  There were no abnormalities seen on ultrasound; a digital image was taken; and the patient tolerated the procedure with no complications. Images:still images obtained Laterality:left Block Type:femoral and adductor canal block Injection Technique:single-shot Needle Type:echogenic Resistance on Injection: none Medications Used: ropivacaine (NAROPIN) 0.5 % injection, 30 mL Post Assessment Injection Assessment: negative aspiration for heme, no paresthesia on injection and incremental injection Patient Tolerance:comfortable throughout block Complications:no Additional Notes Ultrasound Interpretation:  Using ultrasound guidance the needle was placed in close proximity to the femoral nerve and anesthetic was injected in the area of the femoral nerve and spread of the anesthetic was seen on  ultrasound in close proximity thereto.  There were no abnormalities seen on ultrasound; a digital image was taken; and the patient tolerated the procedure with no complications.  Block placed for postoperative pain control per surgeon request.       Assesment/Plan:    Procedures:  Left TKA  Postoperative Day: 1  Weightbearing Status:  WBAT with walker  DVT Prophylaxis:  ASA for DVT prophylaxis    Disposition:  Continue to follow along with [facility] and pulmonology.     Rahat Spence PA-C  [facility]  [address] [place], [place] [address] [phone]    4/14/2021

## 2021-04-14 NOTE — PROGRESS NOTES
Continued Stay Note  [facility]     Patient Name: Jenn Dupont  MRN: [id number]  Today's Date: 4/14/2021    Admit Date: 4/13/2021    Discharge Plan     Row Name 04/14/21 1737       Plan    Plan  [facility] -- Referral Pending.    Patient/Family in Agreement with Plan  yes    Plan Comments  Spoke with the patient, verified current information and explained the role of the CCP. Patient plans to d/c home with family support and HH. Physical Therapy eval/rec noted and discussed with the patient who is agreeable to SNF. She requests a referral to [facility]. Referral sent in [facility]. Patient will likely need tranpsortation arranged at d/c. No other needs identified at this time. CCP following.        Discharge Codes    No documentation.             Marycruz Abdalla RN

## 2021-04-14 NOTE — CONSULTS
Referring Provider: Dr. Camargo  Reason for Consultation: Hypoxia    Patient Care Team:  Nima Diop MD as PCP - General  Nima Diop MD as PCP - Family Medicine    Chief complaint:   Hypoxia    History of present illness:    Subjective   This is a 75-year-old female patient, former smoker (25 packs year).     She has chronic shortness of breath on moderate to severe activities such as walking 1 mile associated with mild intermittent cough in the morning which he attributed to sinusitis but otherwise denies history of COPD.    She reported a history of CHF for which she follows with Dr. Bernstein at [facility] and takes Lasix PRN for legs swelling, which she uses on average QOD.     She is here today for left knee replacement.  Procedure was uneventful.  Postoperatively, she was transferred to the floor and noted to be hypoxic.  She was initially started on 4 L oxygen but then her oxygen requirement increased and oxygen supplementation was increased to 6 L/min.  ABG was drawn and showed hypercapnia.    On my evaluation, patient appears to be comfortable.  She is not really somnolent.  She has not received any narcotics since she has gotten out of the OR about 3 hours ago.  She denies prior history of sleep apnea but reported loud snoring and frequent awakenings at night, on average 3 times and also reported excessive daytime sleepiness and napping during the day.  She does not use oxygen at home.    AB.32/57/87 on NC 6 L; bicarb 30; potassium 3.5; creatinine 0.6; WBC 8    Review of Systems  Constitutional: No fever or chills.   ENMT: Intermittent sinus congestion.  Cardiovascular: No chest pain, palpitation but intermittent legs swelling.    Respiratory: Dyspnea on severe exertion.  Mild remittent cough.  No wheezing  Gastrointestinal: No constipation, diarrhea or abdominal pain.  No nausea or vomiting  Neurology: No headache, weakness, numbness or dizziness.   Musculoskeletal: Pain in the  left knee site of surgery.  No stiffness.   Psychiatry: No depression.  Genitourinary: No dysuria or frequent urination  Endo: No weight changes. No cold or warm intolerance.  Lymphatic: No swollen glands.  Integumentary: No rash.    History  Past Medical History:   Diagnosis Date   • Asthma     ALLERGY INDUCED    • Coronary artery disease    • Diabetes mellitus (CMS/[facility])    • Elevated cholesterol    • Hypertension    • Knee pain, left    • Low back pain    • MI (myocardial infarction) (CMS/[facility]) 2013   • Pulmonary embolism (CMS/[facility])     2009   • Shoulder pain, left    • Urinary frequency    ,   Past Surgical History:   Procedure Laterality Date   • CHOLECYSTECTOMY     • CORONARY ANGIOPLASTY WITH STENT PLACEMENT  2013   • HYSTERECTOMY     • TONSILLECTOMY     • TOTAL KNEE ARTHROPLASTY Left 2015   • TOTAL KNEE ARTHROPLASTY Right 2015   ,   Family History   Problem Relation Age of Onset   • Malig Hyperthermia Neg Hx    ,   Social History     Socioeconomic History   • Marital status:      Spouse name: Not on file   • Number of children: Not on file   • Years of education: Not on file   • Highest education level: Not on file   Tobacco Use   • Smoking status: Current Some Day Smoker   • Smokeless tobacco: Never Used   • Tobacco comment: 3-4 CIGS A DAY /last smoke 4/13/2021 early am   Vaping Use   • Vaping Use: Never used   Substance and Sexual Activity   • Alcohol use: Never   • Drug use: Never   • Sexual activity: Defer       ,   Medications Prior to Admission   Medication Sig Dispense Refill Last Dose   • albuterol sulfate  (90 Base) MCG/ACT inhaler Inhale 2 puffs Every 4 (Four) Hours As Needed for Wheezing.   4/12/2021 at 0700   • allopurinol (ZYLOPRIM) 100 MG tablet Take 100 mg by mouth Daily.   4/12/2021 at Unknown time   • amLODIPine (NORVASC) 5 MG tablet Take 10 mg by mouth Every Night.   4/12/2021 at 2100   • Aspirin-Caffeine (ANACIN PO) Take 500 mg by mouth 3 (Three) Times a Day.   4/8/2021   •  atorvastatin (LIPITOR) 20 MG tablet Take 20 mg by mouth Daily.   4/12/2021 at 2100   • chlorhexidine (HIBICLENS) 4 % external liquid Apply 1 application topically to the appropriate area as directed See Admin Instructions.   4/13/2021 at 0630   • Cyanocobalamin 500 MCG sublingual tablet Place 500 mcg under the tongue Daily.   4/12/2021 at 0800   • Dulaglutide 0.75 MG/0.5ML solution pen-injector Inject 0.75 mg under the skin into the appropriate area as directed 1 (One) Time Per Week. sunday 4/11/2021   • metoprolol tartrate (LOPRESSOR) 25 MG tablet Take 25 mg by mouth Every Night.   4/12/2021 at 2100   • mupirocin (BACTROBAN) 2 % ointment Apply 1 application topically to the appropriate area as directed 3 (Three) Times a Day.   4/13/2021 at 0630   • potassium chloride 10 MEQ CR tablet TAKE ONE TABLET BY MOUTH DAILY   4/12/2021 at 0800   • triamterene-hydrochlorothiazide (MAXZIDE) 75-50 MG per tablet Take 1 tablet by mouth Daily.   4/12/2021 at 0800   • vilazodone (Viibryd) 40 MG tablet tablet Take 40 mg by mouth Daily.   4/12/2021 at 0800   , Scheduled Meds:  allopurinol, 100 mg, Oral, Daily  aspirin, 325 mg, Oral, BID With Meals  atorvastatin, 20 mg, Oral, Daily  clindamycin, 900 mg, Intravenous, Q8H  ferrous sulfate, 325 mg, Oral, Daily With Breakfast  metoprolol tartrate, 25 mg, Oral, Nightly  mupirocin, , Each Nare, BID  potassium chloride, 10 mEq, Oral, Daily  sodium chloride, 3 mL, Intravenous, Q12H  triamterene-hydrochlorothiazide, 1 tablet, Oral, Daily  vilazodone, 40 mg, Oral, Daily    , Continuous Infusions:    and Allergies:  Patient has no known allergies.    Objective     Vital Signs   Temp:  [97 °F (36.1 °C)-97.8 °F (36.6 °C)] 97.8 °F (36.6 °C)  Heart Rate:  [72-92] 80  Resp:  [16-20] 18  BP: ()/(29-84) 105/64    PPE used per hospital policy    Physical Exam:  Constitutional: Not in acute distress.  Eyes: Injected conjunctivae, EOMI. pupils equal reactive to light.  ENMT: Jordan and  Mallampati 4. No oral thrush.   Neck: Large. Trachea midline. No thyromegaly  Heart: Tachycardiac regular rhythm. no murmur  Lungs: Good and equal air entry bilaterally.  Bilateral crackles up to the upper lobes posteriorly.  No wheezing. Non labored breathing.   Abdomen: Obese. Soft. No tenderness or dullness. No HSM.  Extremities: No cyanosis, clubbing but grade 2-3 pitting edema in legs.  Warm extremities and well-perfused.  Neuro: Conscious, alert, oriented x3.  Strength 5/5 in arms.  Psych: Appropriate mood and affect.    Integumentary: No rash.  Normal skin turgor  Lymphatic: No palpable cervical or supraclavicular lymph nodes.      Diagnostic imaging:  I personally and independently reviewed the following images:   CXR 4/13/2021: Diffuse bilateral interstitial pulmonary infiltrates and increased vascular markings.        Assessment   1. Acute on likely chronic hypercapnic  respiratory failure: Secondary to volume overload, atelectasis postoperatively, hypoventilation from anesthesia and narcotics and on top of probable MYRNA  2. Acute hypoxic respiratory failure  3. Mild pulmonary edema/vascular congestion  4. Bilateral lower lobe atelectasis  5. Probable obstructive sleep apnea  6. 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 Former smoker  8. Morbid obesity, BMI 47    Recommendations:  · Administer Lasix 40 mg IV stat and 40 raquel KCl  · Start DuoNeb 4 times a day  · Oxygen by nasal cannula titrate to keep SPO2 >92%.  Despite being hypercapnic and slightly acidotic, she is not lethargic and does not appear to be in distress.  We can perhaps use an auto BiPAP for her if needed.we will hold off for now  · Minimize narcotics in the setting of hypercapnia and probable sleep apnea  · IS      Olivia Kelly MD  04/14/21  00:21 EDT

## 2021-04-14 NOTE — PROGRESS NOTES
LOS: 0 days   Patient Care Team:  Nima Diop MD as PCP - General  Nima Diop MD as PCP - Family Medicine    Subjective     Following up Dr. Kelly from earlier today patient with acute on possibly chronic hypercapnic respiratory failure.  Patient says she is feeling better she has never used O2 but she is on 4 L O2 with saturations about 90%.  She is a smoker she still smokes says she does not like to admit and she will not tell me how much it is she says if she tells me how much all scold her.  She is never been diagnosed with sleep apnea but she says she is a big snorer and it does awaken her from sleep frequently and she is not really coughing no chest pain    Review of Systems:          Objective     Vital Signs  Vital Sign Min/Max for last 24 hours  Temp  Min: 97 °F (36.1 °C)  Max: 98.2 °F (36.8 °C)   BP  Min: 93/60  Max: 133/62   Pulse  Min: 71  Max: 92   Resp  Min: 16  Max: 18   SpO2  Min: 88 %  Max: 95 %   Flow (L/min)  Min: 2  Max: 6   No data recorded        Ventilator/Non-Invasive Ventilation Settings (From admission, onward) Comment    None                       Body mass index is 47.53 kg/m².  I/O last 3 completed shifts:  In: 950 [P.O.:300; I.V.:650]  Out: 2200 [Urine:1800; Drains:100; Blood:300]  I/O this shift:  In: 480 [P.O.:480]  Out: 420 [Urine:400; Drains:20]        Physical Exam:  General Appearance: Well-developed obese black female she is resting comfortably in bed she does not look in acute distress  Eyes: Conjunctiva are clear and anicteric  ENT: Mucous membranes are moist no erythema no exudates she has a Mallampati 3 almost 4 airway  Neck: No adenopathy no jugular venous tension trachea midline neck is short and obese  Lungs: Clear no wheezes no rales no rhonchi but she does not move a whole lot air very shallow respirations no percussible dullness and no accessory muscle use she is talking in full sentences.  Cardiac: Regular rate rhythm no murmur  Abdomen: Obese  soft no palpable hepatosplenomegaly or masses  : Not examined  Musculoskeletal: Postop changes left knee  Skin: Warm dry no jaundice no petechiae  Neuro: She is alert oriented cooperative grossly intact  Extremities/P Vascular: Clubbing no cyanosis, the only edema is just about that left knee  palpable radial and dorsalis pedis pulses  MSE: Super pleasant lady seems to be in pretty good spirits       Labs:  Results from last 7 days   Lab Units 04/14/21  0640 04/13/21  2240   GLUCOSE mg/dL 255* 212*   SODIUM mmol/L 141 138   POTASSIUM mmol/L 4.3 3.5   CO2 mmol/L 31.5* 30.8*   CHLORIDE mmol/L 101 98   ANION GAP mmol/L 8.5 9.2   CREATININE mg/dL 0.71 0.68   BUN mg/dL 19 21   BUN / CREAT RATIO  26.8* 30.9*   CALCIUM mg/dL 9.0 8.8   ALK PHOS U/L  --  122*   TOTAL PROTEIN g/dL  --  6.7   ALT (SGPT) U/L  --  60*   AST (SGOT) U/L  --  77*   BILIRUBIN mg/dL  --  0.4   ALBUMIN g/dL  --  3.50   GLOBULIN gm/dL  --  3.2     Estimated Creatinine Clearance: 70.5 mL/min (by C-G formula based on SCr of 0.71 mg/dL).      Results from last 7 days   Lab Units 04/14/21  0640 04/13/21  2240   WBC 10*3/mm3 10.48 8.26   RBC 10*6/mm3 5.05 5.09   HEMOGLOBIN g/dL 14.0 13.6   HEMATOCRIT % 45.4 43.1   MCV fL 89.9 84.7   MCH pg 27.7 26.7   MCHC g/dL 30.8* 31.6   RDW % 15.2 14.9   RDW-SD fl 50.2 45.3   MPV fL 10.5 10.6   PLATELETS 10*3/mm3 214 231   NEUTROPHIL % %  --  93.3*   LYMPHOCYTE % %  --  4.1*   MONOCYTES % %  --  2.1*   EOSINOPHIL % %  --  0.0*   BASOPHIL % %  --  0.1   IMM GRAN % %  --  0.4   NEUTROS ABS 10*3/mm3  --  7.71*   LYMPHS ABS 10*3/mm3  --  0.34*   MONOS ABS 10*3/mm3  --  0.17   EOS ABS 10*3/mm3  --  0.00   BASOS ABS 10*3/mm3  --  0.01   IMMATURE GRANS (ABS) 10*3/mm3  --  0.03   NRBC /100 WBC  --  0.0     Results from last 7 days   Lab Units 04/13/21  2332   PH, ARTERIAL pH units 7.327*   PO2 ART mm Hg 87.2   PCO2, ARTERIAL mm Hg 57.8*   HCO3 ART mmol/L 30.2*     Results from last 7 days   Lab Units 04/13/21  2240   TROPONIN  T ng/mL <0.010     Results from last 7 days   Lab Units 04/13/21  2240   PROBNP pg/mL 125.2                 Microbiology Results (last 10 days)     Procedure Component Value - Date/Time    COVID PRE-OP / PRE-PROCEDURE SCREENING ORDER (NO ISOLATION) - Swab, Nasopharynx [[id number]]  (Normal) Collected: 04/13/21 0939    Lab Status: Final result Specimen: Swab from Nasopharynx Updated: 04/13/21 1038    Narrative:      The following orders were created for panel order COVID PRE-OP / PRE-PROCEDURE SCREENING ORDER (NO ISOLATION) - Swab, Nasopharynx.  Procedure                               Abnormality         Status                     ---------                               -----------         ------                     COVID-19,[facility]...[[id number]]  Normal              Final result                 Please view results for these tests on the individual orders.    COVID-19,[facility] CEPHEID, NP SWAB IN TRANSPORT MEDIA 8-12 HR TAT - Swab, Nasopharynx [[id number]]  (Normal) Collected: 04/13/21 0939    Lab Status: Final result Specimen: Swab from Nasopharynx Updated: 04/13/21 1038     COVID19 Not Detected    Narrative:      Fact sheet for providers: https://www.fda.gov/media/953375/download     Fact sheet for patients: https://www.fda.gov/media/870868/download              allopurinol, 100 mg, Oral, Daily  aspirin, 325 mg, Oral, BID With Meals  atorvastatin, 20 mg, Oral, Daily  ferrous sulfate, 325 mg, Oral, Daily With Breakfast  insulin lispro, 0-7 Units, Subcutaneous, TID AC  ipratropium-albuterol, 3 mL, Nebulization, 4x Daily - RT  metoprolol tartrate, 25 mg, Oral, Nightly  mupirocin, , Each Nare, BID  potassium chloride, 10 mEq, Oral, Daily  sodium chloride, 3 mL, Intravenous, Q12H  sodium chloride, 4 mL, Nebulization, BID - RT  triamterene-hydrochlorothiazide, 1 tablet, Oral, Daily  vilazodone, 40 mg, Oral, Daily           Diagnostics:  XR Chest 2 View    Result Date: 3/30/2021   DATE OF EXAM: 3/30/2021 1:45  PM  PROCEDURE: XR CHEST 2 VW-  INDICATIONS: preop; M25.50-Pain in unspecified joint; Z01.818-Encounter for other preprocedural examination  COMPARISON: No Comparisons Available  TECHNIQUE: PA and lateral views of the chest were obtained.  FINDINGS: Heart size within normal limits. Mild tortuosity descending thoracic aorta. No focal consolidation, pneumothorax, or large pleural effusion. Multilevel disc narrowing the thoracic spine.      No acute process.  Electronically Signed By-Piotr Hayes MD On:3/30/2021 2:05 PM This report was finalized on [id number] by  Piotr Hayes MD.    XR Knee 1 or 2 View Left    Result Date: 4/13/2021  EXAMINATION: 2 VIEWS OF THE LEFT KNEE  HISTORY: 75-year-old female status post total left knee arthroplasty  FINDINGS: AP and lateral radiographs of the left knee were obtained. Comparison is made to prior left knee radiographs dated 06/23/2015. There has been interval revision of the left knee arthroplasty components with placement of femoral and tibial components that contain interfemoral shafts. Overlying skin staples are noted. There is no evidence for a fracture. Gas is seen within the surrounding soft tissues.      Status post recent revision of total left knee arthroplasty without evidence for an acute abnormality.  This report was finalized on 4/13/2021 8:51 PM by Dr. Shaka Montero M.D.      XR Chest 1 View    Result Date: 4/13/2021  Patient: YOBANI BLOUNT  Time Out: 22:53 Exam(s): FILM CXR 1 VIEW EXAM:   XR Chest, 1 View CLINICAL HISTORY:    hypoxia  Reason for exam: hypoxia. TECHNIQUE:   Frontal view of the chest. COMPARISON:   2 1 2016. FINDINGS:   Lungs:  Diffuse bilateral lung opacities with subpleural fissural thickening.   Pleural space:  No pneumothorax or significant pleural effusions.   Heart:  Mild cardiomegaly.   Mediastinum:  Unremarkable.   Bones joints:  Multilevel degenerative changes of the thoracic spine.   Vasculature:  Calcified thoracic aorta.  IMPRESSION:       Mild CHF.     Electronically signed by Joseph Peña M.D. on 04-13-21 at 2253    Peripheral Block    Result Date: 4/13/2021  Shoaib Mcmillan MD     4/13/2021 12:24 PM Peripheral Block Pre-sedation assessment completed: 4/13/2021 12:20 PM Patient reassessed immediately prior to procedure Patient location during procedure: holding area Start time: 4/13/2021 12:20 PM Stop time: 4/13/2021 12:24 PM Reason for block: at surgeon's request and post-op pain management Performed by Anesthesiologist: Shoaib Mcmillan MD Preanesthetic Checklist Completed: patient identified, IV checked, site marked, risks and benefits discussed, surgical consent, monitors and equipment checked, pre-op evaluation and timeout performed Prep: Sterile barriers:cap, gloves and mask Prep: ChloraPrep Patient monitoring: blood pressure monitoring, continuous pulse oximetry and EKG Procedure Sedation:yes Performed under: local infiltration Guidance:ultrasound guided ULTRASOUND INTERPRETATION.  Using ultrasound guidance a 21 G gauge needle was placed in close proximity to the femoral nerve, at which point, under ultrasound guidance anesthetic was injected in the area of the nerve and spread of the anesthesia was seen on ultrasound in close proximity thereto.  There were no abnormalities seen on ultrasound; a digital image was taken; and the patient tolerated the procedure with no complications. Images:still images obtained Laterality:left Block Type:femoral and adductor canal block Injection Technique:single-shot Needle Type:echogenic Resistance on Injection: none Medications Used: ropivacaine (NAROPIN) 0.5 % injection, 30 mL Post Assessment Injection Assessment: negative aspiration for heme, no paresthesia on injection and incremental injection Patient Tolerance:comfortable throughout block Complications:no Additional Notes Ultrasound Interpretation:  Using ultrasound guidance the needle was placed in close proximity to the  femoral nerve and anesthetic was injected in the area of the femoral nerve and spread of the anesthetic was seen on ultrasound in close proximity thereto.  There were no abnormalities seen on ultrasound; a digital image was taken; and the patient tolerated the procedure with no complications.  Block placed for postoperative pain control per surgeon request.         I reviewed chest x-ray there is some atelectasis lung volumes are low vasculature is a little prominent I am not sure how much pulmonary edema there truly is.    Active Hospital Problems    Diagnosis  POA   • **Aseptic loosening of prosthetic knee (CMS/[facility]) [T84.038A, Z96.659]  Not Applicable   • Coronary artery disease [I25.10]  Unknown   • Diabetes mellitus (CMS/[facility]) [E11.9]  Unknown   • Asthma [J45.909]  Unknown   • Hypertension [I10]  Unknown   • DJD (degenerative joint disease) [M19.90]  Yes      Resolved Hospital Problems   No resolved problems to display.         Assessment/Plan     1. Status post 4/13/2021 left total knee arthroplasty revision  2. Acute hypoxemic and hypercapnic respiratory failure I agree with Dr. Kelly she may well have some chronic hypoxia and hypercapnia her relatively minimal drop in pH for the degree of hypercapnia suggest possible chronicity and the elevated serum bicarbonate would suggest this as well.  I think she has atelectasis I am not sure whether there is a little bit of heart failure.  And I think it is very likely she has sleep apnea contributing to her symptoms.  She probably also has some underlying COPD with her smoking history.  I am going to increase bronchodilators add some hypertonic saline see if we can mobilize some secretions we will add OPEP  as well to help with secretions.  Continue incentive spirometry, wean O2 as tolerated  3. Atelectasis bilateral pulmonary hygiene as above  4. Morbid obesity with a BMI over 47 may even have obesity hypoventilation syndrome.  5. Diabetes mellitus type  2  6. Tobacco abuse she admitted to me today that she is actively smoking even though slightly still people otherwise she will not tell me how much she says I will chastised her too much if I know how much she is smoking.  Did talk with her about stopping smoking.  7. Probable COPD with her smoking history she probably should have some lung functions after discharge as an outpatient when she can do the testing   8. Probable sleep apnea she will need a sleep study after discharge.  She desaturates tonight with sleep we may want to put her on a noninvasive ventilator          Plan for disposition:    Sonny Louis MD  04/14/21  17:12 EDT    Time:

## 2021-04-14 NOTE — PLAN OF CARE
Goal Outcome Evaluation:  Plan of Care Reviewed With: patient  Progress: improving  Outcome Summary: A&Ox4. O2 sats run 90-92 on 4 L. Dressing changed, HV removed. Perocet for pain. Working with PT. Assist x1. Voiding function intact. DM with SSI. Plans to D/C to SNF pending placement. Will cont to edward.

## 2021-04-14 NOTE — PROGRESS NOTES
"DAILY PROGRESS NOTE  [facility]    Patient Identification:  Name: Jenn Dupont  Age: 75 y.o.  Sex: female  :  1945  MRN: [id number]         Primary Care Physician: Nima Diop MD    Subjective:  Interval History:She complains of pain.    Objective:    Scheduled Meds:allopurinol, 100 mg, Oral, Daily  aspirin, 325 mg, Oral, BID With Meals  atorvastatin, 20 mg, Oral, Daily  ferrous sulfate, 325 mg, Oral, Daily With Breakfast  insulin lispro, 0-7 Units, Subcutaneous, TID AC  metoprolol tartrate, 25 mg, Oral, Nightly  mupirocin, , Each Nare, BID  potassium chloride, 10 mEq, Oral, Daily  sodium chloride, 3 mL, Intravenous, Q12H  triamterene-hydrochlorothiazide, 1 tablet, Oral, Daily  vilazodone, 40 mg, Oral, Daily      Continuous Infusions:     Vital signs in last 24 hours:  Temp:  [97 °F (36.1 °C)-98.2 °F (36.8 °C)] 98.2 °F (36.8 °C)  Heart Rate:  [71-92] 71  Resp:  [16-20] 16  BP: ()/(29-84) 107/65    Intake/Output:    Intake/Output Summary (Last 24 hours) at 2021 1419  Last data filed at 2021 1300  Gross per 24 hour   Intake 1280 ml   Output 2620 ml   Net -1340 ml       Exam:  /65 (BP Location: Left arm, Patient Position: Lying)   Pulse 71   Temp 98.2 °F (36.8 °C) (Skin)   Resp 16   Ht 152.4 cm (60\")   Wt 110 kg (243 lb 6.2 oz)   SpO2 92%   BMI 47.53 kg/m²     General Appearance:    Alert, cooperative, no distress   Head:    Normocephalic, without obvious abnormality, atraumatic   Eyes:       Throat:   Lips, tongue, gums normal   Neck:   Supple, symmetrical, trachea midline, no JVD   Lungs:     Clear to auscultation bilaterally, respirations unlabored   Chest Wall:    No tenderness or deformity    Heart:    Regular rate and rhythm, S1 and S2 normal, no murmur,no  Rub or gallop   Abdomen:     Soft, nontender, bowel sounds active, no masses, no organomegaly    Extremities:   Extremities normal, atraumatic, no cyanosis or edema   Pulses:      Skin:   Skin is " warm and dry,  no rashes or palpable lesions   Neurologic:   no focal deficits noted      Lab Results (last 72 hours)     Procedure Component Value Units Date/Time    POC Glucose Once [[id number]]  (Abnormal) Collected: 04/14/21 1032    Specimen: Blood Updated: 04/14/21 1037     Glucose 292 mg/dL     Basic Metabolic Panel [[id number]]  (Abnormal) Collected: 04/14/21 0640    Specimen: Blood Updated: 04/14/21 0745     Glucose 255 mg/dL      BUN 19 mg/dL      Creatinine 0.71 mg/dL      Sodium 141 mmol/L      Potassium 4.3 mmol/L      Chloride 101 mmol/L      CO2 31.5 mmol/L      Calcium 9.0 mg/dL      eGFR  African Amer 98 mL/min/1.73      BUN/Creatinine Ratio 26.8     Anion Gap 8.5 mmol/L     Narrative:      GFR Normal >60  Chronic Kidney Disease <60  Kidney Failure <15      CBC (No Diff) [[id number]]  (Abnormal) Collected: 04/14/21 0640    Specimen: Blood Updated: 04/14/21 0711     WBC 10.48 10*3/mm3      RBC 5.05 10*6/mm3      Hemoglobin 14.0 g/dL      Hematocrit 45.4 %      MCV 89.9 fL      MCH 27.7 pg      MCHC 30.8 g/dL      RDW 15.2 %      RDW-SD 50.2 fl      MPV 10.5 fL      Platelets 214 10*3/mm3     POC Glucose Once [[id number]]  (Abnormal) Collected: 04/14/21 0649    Specimen: Blood Updated: 04/14/21 0652     Glucose 261 mg/dL     Blood Gas, Arterial - [[id number]]  (Abnormal) Collected: 04/13/21 2332    Specimen: Arterial Blood Updated: 04/13/21 2337     Site Arterial: right radial     Moy's Test Positive     pH, Arterial 7.327 pH units      pCO2, Arterial 57.8 mm Hg      Comment: Critical:Notify RAMIREZ GONZALEZ (13-Apr-21 23:35:44)Read back ok        pO2, Arterial 87.2 mm Hg      HCO3, Arterial 30.2 mmol/L      Base Excess, Arterial 2.6 mmol/L      O2 Saturation Calculated 95.6 %      Barometric Pressure for Blood Gas 751.8 mmHg      Modality HFNC     Flow Rate 6 lpm      Rate 18 Breaths/minute     BNP [[id number]]  (Normal) Collected: 04/13/21 2240    Specimen: Blood Updated: 04/13/21 2330     proBNP 125.2  pg/mL     Narrative:      Among patients with dyspnea, NT-proBNP is highly sensitive for the detection of acute congestive heart failure. In addition NT-proBNP of <300 pg/ml effectively rules out acute congestive heart failure with 99% negative predictive value.    Results may be falsely decreased if patient taking Biotin.      Troponin [[id number]]  (Normal) Collected: 04/13/21 2240    Specimen: Blood Updated: 04/13/21 2330     Troponin T <0.010 ng/mL     Narrative:      Troponin T Reference Range:  <= 0.03 ng/mL-   Negative for AMI  >0.03 ng/mL-     Abnormal for myocardial necrosis.  Clinicians would have to utilize clinical acumen, EKG, Troponin and serial changes to determine if it is an Acute Myocardial Infarction or myocardial injury due to an underlying chronic condition.       Results may be falsely decreased if patient taking Biotin.      Comprehensive Metabolic Panel [[id number]]  (Abnormal) Collected: 04/13/21 2240    Specimen: Blood Updated: 04/13/21 2325     Glucose 212 mg/dL      BUN 21 mg/dL      Creatinine 0.68 mg/dL      Sodium 138 mmol/L      Potassium 3.5 mmol/L      Chloride 98 mmol/L      CO2 30.8 mmol/L      Calcium 8.8 mg/dL      Total Protein 6.7 g/dL      Albumin 3.50 g/dL      ALT (SGPT) 60 U/L      AST (SGOT) 77 U/L      Alkaline Phosphatase 122 U/L      Total Bilirubin 0.4 mg/dL      eGFR  African Amer 103 mL/min/1.73      Globulin 3.2 gm/dL      A/G Ratio 1.1 g/dL      BUN/Creatinine Ratio 30.9     Anion Gap 9.2 mmol/L     Narrative:      GFR Normal >60  Chronic Kidney Disease <60  Kidney Failure <15      CBC & Differential [[id number]]  (Abnormal) Collected: 04/13/21 2240    Specimen: Blood Updated: 04/13/21 2320    Narrative:      The following orders were created for panel order CBC & Differential.  Procedure                               Abnormality         Status                     ---------                               -----------         ------                     CBC Auto  Differential[[id number]]        Abnormal            Final result                 Please view results for these tests on the individual orders.    CBC Auto Differential [[id number]]  (Abnormal) Collected: 04/13/21 2240    Specimen: Blood Updated: 04/13/21 2320     WBC 8.26 10*3/mm3      RBC 5.09 10*6/mm3      Hemoglobin 13.6 g/dL      Hematocrit 43.1 %      MCV 84.7 fL      MCH 26.7 pg      MCHC 31.6 g/dL      RDW 14.9 %      RDW-SD 45.3 fl      MPV 10.6 fL      Platelets 231 10*3/mm3      Neutrophil % 93.3 %      Lymphocyte % 4.1 %      Monocyte % 2.1 %      Eosinophil % 0.0 %      Basophil % 0.1 %      Immature Grans % 0.4 %      Neutrophils, Absolute 7.71 10*3/mm3      Lymphocytes, Absolute 0.34 10*3/mm3      Monocytes, Absolute 0.17 10*3/mm3      Eosinophils, Absolute 0.00 10*3/mm3      Basophils, Absolute 0.01 10*3/mm3      Immature Grans, Absolute 0.03 10*3/mm3      nRBC 0.0 /100 WBC     POC Glucose Once [[id number]]  (Abnormal) Collected: 04/13/21 2114    Specimen: Blood Updated: 04/13/21 2135     Glucose 207 mg/dL     POC Glucose Once [[id number]]  (Abnormal) Collected: 04/13/21 1643    Specimen: Blood Updated: 04/13/21 1649     Glucose 205 mg/dL     COVID PRE-OP / PRE-PROCEDURE SCREENING ORDER (NO ISOLATION) - Swab, Nasopharynx [[id number]]  (Normal) Collected: 04/13/21 0939    Specimen: Swab from Nasopharynx Updated: 04/13/21 1038    Narrative:      The following orders were created for panel order COVID PRE-OP / PRE-PROCEDURE SCREENING ORDER (NO ISOLATION) - Swab, Nasopharynx.  Procedure                               Abnormality         Status                     ---------                               -----------         ------                     COVID-19,[facility]...[[id number]]  Normal              Final result                 Please view results for these tests on the individual orders.    COVID-19,[facility] CEPHEID, NP SWAB IN TRANSPORT MEDIA 8-12 HR TAT - Swab, Nasopharynx [[id number]]   (Normal) Collected: 04/13/21 0939    Specimen: Swab from Nasopharynx Updated: 04/13/21 1038     COVID19 Not Detected    Narrative:      Fact sheet for providers: https://www.fda.gov/media/610028/download     Fact sheet for patients: https://www.fda.gov/media/418186/download    POC Glucose Once [[id number]]  (Abnormal) Collected: 04/13/21 1024    Specimen: Blood Updated: 04/13/21 1027     Glucose 132 mg/dL     SCANNED - LABS [[id number]] Resulted: 04/13/21     Updated: 04/12/21 1545        Data Review:  Results from last 7 days   Lab Units 04/14/21  0640 04/13/21  2240   SODIUM mmol/L 141 138   POTASSIUM mmol/L 4.3 3.5   CHLORIDE mmol/L 101 98   CO2 mmol/L 31.5* 30.8*   BUN mg/dL 19 21   CREATININE mg/dL 0.71 0.68   GLUCOSE mg/dL 255* 212*   CALCIUM mg/dL 9.0 8.8     Results from last 7 days   Lab Units 04/14/21  0640 04/13/21  2240   WBC 10*3/mm3 10.48 8.26   HEMOGLOBIN g/dL 14.0 13.6   HEMATOCRIT % 45.4 43.1   PLATELETS 10*3/mm3 214 231             Lab Results   Lab Value Date/Time    TROPONINT <0.010 04/13/2021 2240         Results from last 7 days   Lab Units 04/13/21  2240   ALK PHOS U/L 122*   BILIRUBIN mg/dL 0.4   ALT (SGPT) U/L 60*   AST (SGOT) U/L 77*             Glucose   Date/Time Value Ref Range Status   04/14/2021 1032 292 (H) 70 - 130 mg/dL Final   04/14/2021 0649 261 (H) 70 - 130 mg/dL Final   04/13/2021 2114 207 (H) 70 - 130 mg/dL Final   04/13/2021 1643 205 (H) 70 - 130 mg/dL Final   04/13/2021 1024 132 (H) 70 - 130 mg/dL Final           Past Medical History:   Diagnosis Date   • Asthma     ALLERGY INDUCED    • Coronary artery disease    • Diabetes mellitus (CMS/HCC)    • Elevated cholesterol    • Hypertension    • Knee pain, left    • Low back pain    • MI (myocardial infarction) (CMS/HCC) 2013   • Pulmonary embolism (CMS/[facility])     2009   • Shoulder pain, left    • Urinary frequency        Assessment:  Active Hospital Problems    Diagnosis  POA   • **Aseptic loosening of prosthetic knee (CMS/[facility])  [T84.038A, Z96.659]  Not Applicable   • Coronary artery disease [I25.10]  Unknown   • Diabetes mellitus (CMS/HCC) [E11.9]  Unknown   • Asthma [J45.909]  Unknown   • Hypertension [I10]  Unknown   • DJD (degenerative joint disease) [M19.90]  Yes      Resolved Hospital Problems   No resolved problems to display.       Plan:  Continue current RX. Will follow.Holding some BP meds. Sliding scale insulin.      Booker Acosta MD  4/14/2021  14:19 EDT

## 2021-04-14 NOTE — DISCHARGE PLACEMENT REQUEST
"Yobani Dupont (75 y.o. Female)     Date of Birth Social Security Number Address Home Phone MRN    1945  [address] [id number] [phone] [id number]    [place] Marital Status          Hinduism        Admission Date Admission Type Admitting Provider Attending Provider Department, Room/Bed    4/13/21 Elective Rahat Camargo MD Goodin, Robert A, MD [address], P888/1    Discharge Date Discharge Disposition Discharge Destination                       Attending Provider: Rahat Camargo MD    Allergies: No Known Allergies    Isolation: None   Infection: None   Code Status: CPR    Ht: 152.4 cm (60\")   Wt: 110 kg (243 lb 6.2 oz)    Admission Cmt: None   Principal Problem: Aseptic loosening of prosthetic knee (CMS/[facility]) [T84.038A,Z96.659]                 Active Insurance as of 4/13/2021     Primary Coverage     Payor Plan Insurance Group Employer/Plan Group    HUMANA MEDICARE REPLACEMENT HUMANA MEDICARE REPLACEMENT [id number]     Payor Plan Address Payor Plan Phone Number Payor Plan Fax Number Effective Dates    [address] [address] [phone]  8/1/2018 - None Entered    Formerly [facility] [address]       Subscriber Name Subscriber Birth Date Member ID       YOBANI DUPONT 1945 [id number]                 Emergency Contacts      (Rel.) Home Phone Work Phone Mobile Phone    Cherelle Carcamo (Other) [phone] -- [phone]    QUIN MIX (Other) -- -- [phone]    ABBIE MCADAMS (Friend) [phone] -- --              "

## 2021-04-14 NOTE — CONSULTS
Patient Name:  Jenn Dupont  YOB: 1945  MRN:  [id number]  Date of Admission:  4/13/2021  Date of Consult:  4/13/2021  Patient Care Team:  Nima Diop MD as PCP - General  Nima Diop MD as PCP - Family Medicine    Consults  Subjective   History of Present Illness  Ms. Dupont is a 75 y.o. female that has been admitted to [facility] following elective LEFT TOTAL KNEE ARTHROPLASTY REVISION HINGED.  She has been admitted to an orthopedic floor following surgery and we were asked to see and assist with her medical problems, specifically relating to her post-op hypoxia.  At the time of my visit she denies any chest pain, SOA, nausea, vomiting or diarrhea.  She has tolerated a diet.  She does complain of expected postoperative discomfort.  She reports being in a normal state of health leading up to surgery, though she does confirm intermittent shortness of breath with exertion for past year. She also reports productive cough in the mornings in this time, but denies orthopnea, lower extremity swelling, or weight gain. Patient denies history of COPD, though she does report that she is a current smoker and smokes about 3-4 cigarettes a day. Patient reportedly required 4L NC while in PACU after surgery and then upon arrival to the floor, required increase to 6L hi-flow oxygen to maintain sats of 92%, she is not on oxygen at home. RN reports that patient was sleepy during this time but when she was more awake and alert, her oxygen sats were somewhat improved. She has also received pain medication since surgery which probably is contributing.       Past Medical History:   Diagnosis Date   • Asthma     ALLERGY INDUCED    • Coronary artery disease    • Diabetes mellitus (CMS/[facility])    • Elevated cholesterol    • Hypertension    • Knee pain, left    • Low back pain    • MI (myocardial infarction) (CMS/[facility]) 2013   • Pulmonary embolism (CMS/[facility])     2009   • Shoulder pain, left     • Urinary frequency      Past Surgical History:   Procedure Laterality Date   • CHOLECYSTECTOMY     • CORONARY ANGIOPLASTY WITH STENT PLACEMENT  2013   • HYSTERECTOMY     • TONSILLECTOMY     • TOTAL KNEE ARTHROPLASTY Left 2015   • TOTAL KNEE ARTHROPLASTY Right 2015     Family History   Problem Relation Age of Onset   • Malig Hyperthermia Neg Hx      Social History     Tobacco Use   • Smoking status: Current Some Day Smoker   • Smokeless tobacco: Never Used   • Tobacco comment: 3-4 CIGS A DAY /last smoke 4/13/2021 early am   Vaping Use   • Vaping Use: Never used   Substance Use Topics   • Alcohol use: Never   • Drug use: Never     Medications Prior to Admission   Medication Sig Dispense Refill Last Dose   • albuterol sulfate  (90 Base) MCG/ACT inhaler Inhale 2 puffs Every 4 (Four) Hours As Needed for Wheezing.   4/12/2021 at 0700   • allopurinol (ZYLOPRIM) 100 MG tablet Take 100 mg by mouth Daily.   4/12/2021 at Unknown time   • amLODIPine (NORVASC) 5 MG tablet Take 10 mg by mouth Every Night.   4/12/2021 at 2100   • Aspirin-Caffeine (ANACIN PO) Take 500 mg by mouth 3 (Three) Times a Day.   4/8/2021   • atorvastatin (LIPITOR) 20 MG tablet Take 20 mg by mouth Daily.   4/12/2021 at 2100   • chlorhexidine (HIBICLENS) 4 % external liquid Apply 1 application topically to the appropriate area as directed See Admin Instructions.   4/13/2021 at 0630   • Cyanocobalamin 500 MCG sublingual tablet Place 500 mcg under the tongue Daily.   4/12/2021 at 0800   • Dulaglutide 0.75 MG/0.5ML solution pen-injector Inject 0.75 mg under the skin into the appropriate area as directed 1 (One) Time Per Week. sunday 4/11/2021   • metoprolol tartrate (LOPRESSOR) 25 MG tablet Take 25 mg by mouth Every Night.   4/12/2021 at 2100   • mupirocin (BACTROBAN) 2 % ointment Apply 1 application topically to the appropriate area as directed 3 (Three) Times a Day.   4/13/2021 at 0630   • potassium chloride 10 MEQ CR tablet TAKE ONE TABLET BY  MOUTH DAILY   4/12/2021 at 0800   • triamterene-hydrochlorothiazide (MAXZIDE) 75-50 MG per tablet Take 1 tablet by mouth Daily.   4/12/2021 at 0800   • vilazodone (Viibryd) 40 MG tablet tablet Take 40 mg by mouth Daily.   4/12/2021 at 0800     Allergies:  Patient has no known allergies.    Review of Systems   Constitutional: Negative.  Negative for chills and fever.   HENT: Negative.  Negative for congestion and sore throat.    Eyes: Negative.  Negative for visual disturbance.   Respiratory: Positive for shortness of breath (with exertion). Negative for cough.    Cardiovascular: Negative.  Negative for chest pain and leg swelling.   Gastrointestinal: Negative.  Negative for abdominal pain, nausea and vomiting.   Endocrine: Negative.    Genitourinary: Negative.  Negative for dysuria, frequency and urgency.   Musculoskeletal: Positive for arthralgias (left knee). Negative for myalgias.   Skin: Negative.  Negative for color change and pallor.   Neurological: Negative.  Negative for dizziness and light-headedness.   Hematological: Negative.    Psychiatric/Behavioral: Negative.  Negative for agitation, behavioral problems and confusion.       Objective      Vital Signs  Temp:  [97 °F (36.1 °C)-97.7 °F (36.5 °C)] 97 °F (36.1 °C)  Heart Rate:  [72-92] 90  Resp:  [16-20] 16  BP: ()/(29-84) 121/72  Body mass index is 47.53 kg/m².    Physical Exam  Vitals and nursing note reviewed.   Constitutional:       General: She is not in acute distress.     Appearance: She is obese.   HENT:      Head: Normocephalic and atraumatic.      Nose: Nose normal.      Mouth/Throat:      Mouth: Mucous membranes are moist.   Eyes:      Extraocular Movements: Extraocular movements intact.   Cardiovascular:      Rate and Rhythm: Normal rate and regular rhythm.      Pulses: Normal pulses.      Heart sounds: Normal heart sounds.   Pulmonary:      Effort: Pulmonary effort is normal. No respiratory distress.      Breath sounds: Examination of the  right-middle field reveals decreased breath sounds. Examination of the left-middle field reveals decreased breath sounds. Examination of the right-lower field reveals decreased breath sounds. Examination of the left-lower field reveals decreased breath sounds. Decreased breath sounds present.   Abdominal:      General: Abdomen is flat. Bowel sounds are normal. There is no distension.      Palpations: Abdomen is soft.   Musculoskeletal:         General: Tenderness (left knee) present. No swelling.      Cervical back: Normal range of motion and neck supple.      Comments: Left knee wrapped s/p surgery today, drain in place   Skin:     General: Skin is warm and dry.   Neurological:      General: No focal deficit present.      Mental Status: She is alert and oriented to person, place, and time. Mental status is at baseline.   Psychiatric:         Mood and Affect: Mood normal.         Behavior: Behavior normal.         Thought Content: Thought content normal.         Judgment: Judgment normal.         Results Review:   I reviewed the patient's new clinical results.  I reviewed the patient's new imaging results and agree with the interpretation.  I reviewed the patient's other test results and agree with the interpretation  I personally viewed and interpreted the patient's EKG/Telemetry data         Assessment/Plan     Active Hospital Problems    Diagnosis  POA   • Aseptic loosening of prosthetic knee (CMS/[facility]) [T84.038A, Z96.659]  Not Applicable   • DJD (degenerative joint disease) [M19.90]  Yes      Resolved Hospital Problems   No resolved problems to display.       Ms. Dupont is a 75 y.o. female who is s/p LEFT TOTAL KNEE ARTHROPLASTY REVISION HINGED.    · She seems to be doing well thus far postoperatively, no acute distress at this time.   · Reports of intermittent shortness of breath for past year along with post-op hypoxia likely related to undiagnosed COPD given her smoking status, also exacerbated by  anesthesia and pain medications. Will order CXR, ABGs, and labs now, on 6L currently so will consult pulmonology as well.   · Has been slightly hypotensive since surgery.  Will hold Amlodipine.  Anticipate fluctuations in BP due to blood loss, hypovolemia, anesthesia, narcotics etc.   · Continue IVFs as ordered.    · Monitor renal function.  · DVT prophylaxis per surgery.  · She was encouraged to use incentive spirometer as instructed.      Thank you very much for asking A to be involved in this patient's care. We will follow along with you.      NYASIA Bergeron  [facility]  04/13/21  22:22 EDT

## 2021-04-15 ENCOUNTER — APPOINTMENT (OUTPATIENT)
Dept: CT IMAGING | Facility: HOSPITAL | Age: 76
End: 2021-04-15

## 2021-04-15 LAB
ANION GAP SERPL CALCULATED.3IONS-SCNC: 8.9 MMOL/L (ref 5–15)
BASOPHILS # BLD AUTO: 0.03 10*3/MM3 (ref 0–0.2)
BASOPHILS NFR BLD AUTO: 0.4 % (ref 0–1.5)
BUN SERPL-MCNC: 26 MG/DL (ref 8–23)
BUN/CREAT SERPL: 37.7 (ref 7–25)
CALCIUM SPEC-SCNC: 8.1 MG/DL (ref 8.6–10.5)
CHLORIDE SERPL-SCNC: 101 MMOL/L (ref 98–107)
CO2 SERPL-SCNC: 31.1 MMOL/L (ref 22–29)
CREAT SERPL-MCNC: 0.69 MG/DL (ref 0.57–1)
DEPRECATED RDW RBC AUTO: 46.7 FL (ref 37–54)
EOSINOPHIL # BLD AUTO: 0.08 10*3/MM3 (ref 0–0.4)
EOSINOPHIL NFR BLD AUTO: 1.1 % (ref 0.3–6.2)
ERYTHROCYTE [DISTWIDTH] IN BLOOD BY AUTOMATED COUNT: 14.6 % (ref 12.3–15.4)
GFR SERPL CREATININE-BSD FRML MDRD: 101 ML/MIN/1.73
GLUCOSE BLDC GLUCOMTR-MCNC: 129 MG/DL (ref 70–130)
GLUCOSE BLDC GLUCOMTR-MCNC: 153 MG/DL (ref 70–130)
GLUCOSE BLDC GLUCOMTR-MCNC: 163 MG/DL (ref 70–130)
GLUCOSE BLDC GLUCOMTR-MCNC: 209 MG/DL (ref 70–130)
GLUCOSE SERPL-MCNC: 171 MG/DL (ref 65–99)
HCT VFR BLD AUTO: 40.4 % (ref 34–46.6)
HGB BLD-MCNC: 12.6 G/DL (ref 12–15.9)
IMM GRANULOCYTES # BLD AUTO: 0.02 10*3/MM3 (ref 0–0.05)
IMM GRANULOCYTES NFR BLD AUTO: 0.3 % (ref 0–0.5)
LYMPHOCYTES # BLD AUTO: 1.21 10*3/MM3 (ref 0.7–3.1)
LYMPHOCYTES NFR BLD AUTO: 15.9 % (ref 19.6–45.3)
MCH RBC QN AUTO: 27.5 PG (ref 26.6–33)
MCHC RBC AUTO-ENTMCNC: 31.2 G/DL (ref 31.5–35.7)
MCV RBC AUTO: 88.2 FL (ref 79–97)
MONOCYTES # BLD AUTO: 0.64 10*3/MM3 (ref 0.1–0.9)
MONOCYTES NFR BLD AUTO: 8.4 % (ref 5–12)
NEUTROPHILS NFR BLD AUTO: 5.62 10*3/MM3 (ref 1.7–7)
NEUTROPHILS NFR BLD AUTO: 73.9 % (ref 42.7–76)
NRBC BLD AUTO-RTO: 0 /100 WBC (ref 0–0.2)
PLATELET # BLD AUTO: 203 10*3/MM3 (ref 140–450)
PMV BLD AUTO: 10.7 FL (ref 6–12)
POTASSIUM SERPL-SCNC: 4 MMOL/L (ref 3.5–5.2)
RBC # BLD AUTO: 4.58 10*6/MM3 (ref 3.77–5.28)
SODIUM SERPL-SCNC: 141 MMOL/L (ref 136–145)
WBC # BLD AUTO: 7.6 10*3/MM3 (ref 3.4–10.8)

## 2021-04-15 PROCEDURE — 94799 UNLISTED PULMONARY SVC/PX: CPT

## 2021-04-15 PROCEDURE — 97110 THERAPEUTIC EXERCISES: CPT

## 2021-04-15 PROCEDURE — 80048 BASIC METABOLIC PNL TOTAL CA: CPT | Performed by: HOSPITALIST

## 2021-04-15 PROCEDURE — 85025 COMPLETE CBC W/AUTO DIFF WBC: CPT | Performed by: HOSPITALIST

## 2021-04-15 PROCEDURE — 82962 GLUCOSE BLOOD TEST: CPT

## 2021-04-15 PROCEDURE — 71275 CT ANGIOGRAPHY CHEST: CPT

## 2021-04-15 PROCEDURE — 63710000001 INSULIN LISPRO (HUMAN) PER 5 UNITS: Performed by: HOSPITALIST

## 2021-04-15 PROCEDURE — 0 IOPAMIDOL PER 1 ML: Performed by: ORTHOPAEDIC SURGERY

## 2021-04-15 RX ADMIN — OXYCODONE HYDROCHLORIDE AND ACETAMINOPHEN 2 TABLET: 5; 325 TABLET ORAL at 12:33

## 2021-04-15 RX ADMIN — ATORVASTATIN CALCIUM 20 MG: 20 TABLET, FILM COATED ORAL at 08:40

## 2021-04-15 RX ADMIN — MUPIROCIN: 20 OINTMENT TOPICAL at 08:50

## 2021-04-15 RX ADMIN — TRIAMTERENE AND HYDROCHLOROTHIAZIDE 1 TABLET: 75; 50 TABLET ORAL at 08:40

## 2021-04-15 RX ADMIN — IPRATROPIUM BROMIDE AND ALBUTEROL SULFATE 3 ML: 2.5; .5 SOLUTION RESPIRATORY (INHALATION) at 11:37

## 2021-04-15 RX ADMIN — VILAZODONE HYDROCHLORIDE 40 MG: 40 TABLET ORAL at 08:40

## 2021-04-15 RX ADMIN — ALLOPURINOL 100 MG: 100 TABLET ORAL at 08:40

## 2021-04-15 RX ADMIN — IPRATROPIUM BROMIDE AND ALBUTEROL SULFATE 3 ML: 2.5; .5 SOLUTION RESPIRATORY (INHALATION) at 07:50

## 2021-04-15 RX ADMIN — INSULIN LISPRO 3 UNITS: 100 INJECTION, SOLUTION INTRAVENOUS; SUBCUTANEOUS at 12:33

## 2021-04-15 RX ADMIN — DOCUSATE SODIUM 100 MG: 100 CAPSULE, LIQUID FILLED ORAL at 12:33

## 2021-04-15 RX ADMIN — METOPROLOL TARTRATE 25 MG: 25 TABLET, FILM COATED ORAL at 20:56

## 2021-04-15 RX ADMIN — INSULIN LISPRO 2 UNITS: 100 INJECTION, SOLUTION INTRAVENOUS; SUBCUTANEOUS at 08:40

## 2021-04-15 RX ADMIN — SODIUM CHLORIDE, PRESERVATIVE FREE 3 ML: 5 INJECTION INTRAVENOUS at 08:50

## 2021-04-15 RX ADMIN — OXYCODONE HYDROCHLORIDE AND ACETAMINOPHEN 1 TABLET: 5; 325 TABLET ORAL at 03:43

## 2021-04-15 RX ADMIN — OXYCODONE HYDROCHLORIDE AND ACETAMINOPHEN 2 TABLET: 5; 325 TABLET ORAL at 17:46

## 2021-04-15 RX ADMIN — DIAZEPAM 5 MG: 5 TABLET ORAL at 10:40

## 2021-04-15 RX ADMIN — ASPIRIN 325 MG: 325 TABLET, COATED ORAL at 08:40

## 2021-04-15 RX ADMIN — POTASSIUM CHLORIDE 10 MEQ: 750 TABLET, EXTENDED RELEASE ORAL at 08:40

## 2021-04-15 RX ADMIN — IOPAMIDOL 95 ML: 755 INJECTION, SOLUTION INTRAVENOUS at 17:31

## 2021-04-15 RX ADMIN — ASPIRIN 325 MG: 325 TABLET, COATED ORAL at 17:46

## 2021-04-15 RX ADMIN — Medication 4 ML: at 07:51

## 2021-04-15 RX ADMIN — FERROUS SULFATE TAB 325 MG (65 MG ELEMENTAL FE) 325 MG: 325 (65 FE) TAB at 08:40

## 2021-04-15 RX ADMIN — OXYCODONE HYDROCHLORIDE AND ACETAMINOPHEN 1 TABLET: 5; 325 TABLET ORAL at 08:39

## 2021-04-15 RX ADMIN — IPRATROPIUM BROMIDE AND ALBUTEROL SULFATE 3 ML: 2.5; .5 SOLUTION RESPIRATORY (INHALATION) at 15:58

## 2021-04-15 RX ADMIN — SODIUM CHLORIDE, PRESERVATIVE FREE 3 ML: 5 INJECTION INTRAVENOUS at 20:57

## 2021-04-15 NOTE — PLAN OF CARE
Goal Outcome Evaluation:  Plan of Care Reviewed With: patient  Progress: no change  Outcome Summary: POD 2 LTK revision. A&Ox4 with some forgetfulness. O2 @ 3-4 L to maintain betwn 88-92 sat. Drops to low 60's on RA. Dressing CDI. Working with PT. Assist x1-2. Voiding function intact. Increased pain this shift. C/o R calf pain when Dr Louis rounded, BLE doppler and chest CT ordered to r/o DVT/PE. Plans to D/C to SNF when medically stable. DM with SSI, education provided. Will cont to monitor.

## 2021-04-15 NOTE — SIGNIFICANT NOTE
Patient fell when getting to stretcher with transport. Call placed to Dr Camargo. Patient complaining of pain but no worst than before. Waiting for call back from MD.    [phone]- received return call from Dr Tolbert. Since patient is not complaining of additional increased pain and has no obvious deformities, keep her in bed tonight and notify Dr Camarog in the morning.

## 2021-04-15 NOTE — PROGRESS NOTES
LOS: 0 days   Patient Care Team:  Nima Diop MD as PCP - General  Nima Diop MD as PCP - Family Medicine    Subjective   Following up hypoxic respiratory failure patient sleeping on my arrival there is no doubt she has sleep apnea and her oxygen definitely drops mostly sleep is better when she is awake but she desaturated to 67% on room air today.  She is back on 4 L she does not feel particularly short of breath on 4 L     Review of Systems:          Objective     Vital Signs  Vital Sign Min/Max for last 24 hours  Temp  Min: 97.1 °F (36.2 °C)  Max: 98.2 °F (36.8 °C)   BP  Min: 110/72  Max: 129/74   Pulse  Min: 70  Max: 88   Resp  Min: 16  Max: 18   SpO2  Min: 67 %  Max: 95 %   Flow (L/min)  Min: 3  Max: 4   No data recorded        Ventilator/Non-Invasive Ventilation Settings (From admission, onward) Comment    None                       Body mass index is 47.53 kg/m².  I/O last 3 completed shifts:  In: 1020 [P.O.:1020]  Out: 2895 [Urine:2775; Drains:120]  I/O this shift:  In: 360 [P.O.:360]  Out: -         Physical Exam:  General Appearance: Well-developed obese black female she is resting recliner she does not look in acute distress  Eyes: Conjunctiva are clear and anicteric  ENT: Mucous membranes are moist no erythema no exudates she has a Mallampati 3 almost 4 airway  Neck: No adenopathy no jugular venous tension trachea midline neck is short and obese  Lungs: Clear no wheezes no rales no rhonchi but she does not move a whole lot air very shallow respirations no percussible dullness and no accessory muscle use she is talking in full sentences.  Cardiac: Regular rate rhythm no murmur patient had pretty significant right calf tenderness today I would just rubbing down the back of her calf there may be a little cord there as well  Abdomen: Obese soft no palpable hepatosplenomegaly or masses  : Not examined  Musculoskeletal: Postop changes left knee  Skin: Warm dry no jaundice no  petechiae  Neuro: She is alert oriented cooperative grossly intact  Extremities/P Vascular: Clubbing no cyanosis, the only edema is just about that left knee  palpable radial and dorsalis pedis pulses.  MSE: Super pleasant lady seems to be in pretty good spirits       Labs:  Results from last 7 days   Lab Units 04/15/21  0454 04/14/21  0640 04/13/21  2240   GLUCOSE mg/dL 171* 255* 212*   SODIUM mmol/L 141 141 138   POTASSIUM mmol/L 4.0 4.3 3.5   CO2 mmol/L 31.1* 31.5* 30.8*   CHLORIDE mmol/L 101 101 98   ANION GAP mmol/L 8.9 8.5 9.2   CREATININE mg/dL 0.69 0.71 0.68   BUN mg/dL 26* 19 21   BUN / CREAT RATIO  37.7* 26.8* 30.9*   CALCIUM mg/dL 8.1* 9.0 8.8   ALK PHOS U/L  --   --  122*   TOTAL PROTEIN g/dL  --   --  6.7   ALT (SGPT) U/L  --   --  60*   AST (SGOT) U/L  --   --  77*   BILIRUBIN mg/dL  --   --  0.4   ALBUMIN g/dL  --   --  3.50   GLOBULIN gm/dL  --   --  3.2     Estimated Creatinine Clearance: 70.5 mL/min (by C-G formula based on SCr of 0.69 mg/dL).      Results from last 7 days   Lab Units 04/15/21  0454 04/14/21  0640 04/13/21  2240   WBC 10*3/mm3 7.60 10.48 8.26   RBC 10*6/mm3 4.58 5.05 5.09   HEMOGLOBIN g/dL 12.6 14.0 13.6   HEMATOCRIT % 40.4 45.4 43.1   MCV fL 88.2 89.9 84.7   MCH pg 27.5 27.7 26.7   MCHC g/dL 31.2* 30.8* 31.6   RDW % 14.6 15.2 14.9   RDW-SD fl 46.7 50.2 45.3   MPV fL 10.7 10.5 10.6   PLATELETS 10*3/mm3 203 214 231   NEUTROPHIL % % 73.9  --  93.3*   LYMPHOCYTE % % 15.9*  --  4.1*   MONOCYTES % % 8.4  --  2.1*   EOSINOPHIL % % 1.1  --  0.0*   BASOPHIL % % 0.4  --  0.1   IMM GRAN % % 0.3  --  0.4   NEUTROS ABS 10*3/mm3 5.62  --  7.71*   LYMPHS ABS 10*3/mm3 1.21  --  0.34*   MONOS ABS 10*3/mm3 0.64  --  0.17   EOS ABS 10*3/mm3 0.08  --  0.00   BASOS ABS 10*3/mm3 0.03  --  0.01   IMMATURE GRANS (ABS) 10*3/mm3 0.02  --  0.03   NRBC /100 WBC 0.0  --  0.0     Results from last 7 days   Lab Units 04/13/21  2332   PH, ARTERIAL pH units 7.327*   PO2 ART mm Hg 87.2   PCO2, ARTERIAL mm Hg 57.8*    HCO3 ART mmol/L 30.2*     Results from last 7 days   Lab Units 04/13/21  2240   TROPONIN T ng/mL <0.010     Results from last 7 days   Lab Units 04/13/21  2240   PROBNP pg/mL 125.2                 Microbiology Results (last 10 days)     Procedure Component Value - Date/Time    COVID PRE-OP / PRE-PROCEDURE SCREENING ORDER (NO ISOLATION) - Swab, Nasopharynx [[id number]]  (Normal) Collected: 04/13/21 0939    Lab Status: Final result Specimen: Swab from Nasopharynx Updated: 04/13/21 1038    Narrative:      The following orders were created for panel order COVID PRE-OP / PRE-PROCEDURE SCREENING ORDER (NO ISOLATION) - Swab, Nasopharynx.  Procedure                               Abnormality         Status                     ---------                               -----------         ------                     COVID-19,[facility]HOUSE...[[id number]]  Normal              Final result                 Please view results for these tests on the individual orders.    COVID-19,[facility]HOU[facility] CEPHEID, NP SWAB IN TRANSPORT MEDIA 8-12 HR TAT - Swab, Nasopharynx [[id number]]  (Normal) Collected: 04/13/21 0939    Lab Status: Final result Specimen: Swab from Nasopharynx Updated: 04/13/21 1038     COVID19 Not Detected    Narrative:      Fact sheet for providers: https://www.fda.gov/media/436834/download     Fact sheet for patients: https://www.fda.gov/media/105000/download              allopurinol, 100 mg, Oral, Daily  aspirin, 325 mg, Oral, BID With Meals  atorvastatin, 20 mg, Oral, Daily  ferrous sulfate, 325 mg, Oral, Daily With Breakfast  insulin lispro, 0-7 Units, Subcutaneous, TID AC  ipratropium-albuterol, 3 mL, Nebulization, 4x Daily - RT  metoprolol tartrate, 25 mg, Oral, Nightly  mupirocin, , Each Nare, BID  potassium chloride, 10 mEq, Oral, Daily  sodium chloride, 3 mL, Intravenous, Q12H  sodium chloride, 4 mL, Nebulization, BID - RT  triamterene-hydrochlorothiazide, 1 tablet, Oral, Daily  vilazodone, 40 mg, Oral, Daily            Diagnostics:  XR Chest 2 View    Result Date: 3/30/2021   DATE OF EXAM: 3/30/2021 1:45 PM  PROCEDURE: XR CHEST 2 VW-  INDICATIONS: preop; M25.50-Pain in unspecified joint; Z01.818-Encounter for other preprocedural examination  COMPARISON: No Comparisons Available  TECHNIQUE: PA and lateral views of the chest were obtained.  FINDINGS: Heart size within normal limits. Mild tortuosity descending thoracic aorta. No focal consolidation, pneumothorax, or large pleural effusion. Multilevel disc narrowing the thoracic spine.      No acute process.  Electronically Signed By-Piotr Hayes MD On:3/30/2021 2:05 PM This report was finalized on [id number] by  Piotr Hayes MD.    XR Knee 1 or 2 View Left    Result Date: 4/13/2021  EXAMINATION: 2 VIEWS OF THE LEFT KNEE  HISTORY: 75-year-old female status post total left knee arthroplasty  FINDINGS: AP and lateral radiographs of the left knee were obtained. Comparison is made to prior left knee radiographs dated 06/23/2015. There has been interval revision of the left knee arthroplasty components with placement of femoral and tibial components that contain interfemoral shafts. Overlying skin staples are noted. There is no evidence for a fracture. Gas is seen within the surrounding soft tissues.      Status post recent revision of total left knee arthroplasty without evidence for an acute abnormality.  This report was finalized on 4/13/2021 8:51 PM by Dr. Shaka Montero M.D.      XR Chest 1 View    Result Date: 4/13/2021  Patient: YOBANI BLOUNT  Time Out: 22:53 Exam(s): FILM CXR 1 VIEW EXAM:   XR Chest, 1 View CLINICAL HISTORY:    hypoxia  Reason for exam: hypoxia. TECHNIQUE:   Frontal view of the chest. COMPARISON:   2 1 2016. FINDINGS:   Lungs:  Diffuse bilateral lung opacities with subpleural fissural thickening.   Pleural space:  No pneumothorax or significant pleural effusions.   Heart:  Mild cardiomegaly.   Mediastinum:  Unremarkable.   Bones joints:  Multilevel  degenerative changes of the thoracic spine.   Vasculature:  Calcified thoracic aorta. IMPRESSION:       Mild CHF.     Electronically signed by Joseph Peña M.D. on 04-13-21 at 2253    Peripheral Block    Result Date: 4/13/2021  Shoaib Mcmillan MD     4/13/2021 12:24 PM Peripheral Block Pre-sedation assessment completed: 4/13/2021 12:20 PM Patient reassessed immediately prior to procedure Patient location during procedure: holding area Start time: 4/13/2021 12:20 PM Stop time: 4/13/2021 12:24 PM Reason for block: at surgeon's request and post-op pain management Performed by Anesthesiologist: Shoaib Mcmillan MD Preanesthetic Checklist Completed: patient identified, IV checked, site marked, risks and benefits discussed, surgical consent, monitors and equipment checked, pre-op evaluation and timeout performed Prep: Sterile barriers:cap, gloves and mask Prep: ChloraPrep Patient monitoring: blood pressure monitoring, continuous pulse oximetry and EKG Procedure Sedation:yes Performed under: local infiltration Guidance:ultrasound guided ULTRASOUND INTERPRETATION.  Using ultrasound guidance a 21 G gauge needle was placed in close proximity to the femoral nerve, at which point, under ultrasound guidance anesthetic was injected in the area of the nerve and spread of the anesthesia was seen on ultrasound in close proximity thereto.  There were no abnormalities seen on ultrasound; a digital image was taken; and the patient tolerated the procedure with no complications. Images:still images obtained Laterality:left Block Type:femoral and adductor canal block Injection Technique:single-shot Needle Type:echogenic Resistance on Injection: none Medications Used: ropivacaine (NAROPIN) 0.5 % injection, 30 mL Post Assessment Injection Assessment: negative aspiration for heme, no paresthesia on injection and incremental injection Patient Tolerance:comfortable throughout block Complications:no Additional Notes Ultrasound  Interpretation:  Using ultrasound guidance the needle was placed in close proximity to the femoral nerve and anesthetic was injected in the area of the femoral nerve and spread of the anesthetic was seen on ultrasound in close proximity thereto.  There were no abnormalities seen on ultrasound; a digital image was taken; and the patient tolerated the procedure with no complications.  Block placed for postoperative pain control per surgeon request.         I reviewed chest x-ray there is some atelectasis lung volumes are low vasculature is a little prominent I am not sure how much pulmonary edema there truly is.    Active Hospital Problems    Diagnosis  POA   • **Aseptic loosening of prosthetic knee (CMS/HCC) [T84.038A, Z96.659]  Not Applicable   • Coronary artery disease [I25.10]  Unknown   • Diabetes mellitus (CMS/[facility]CC) [E11.9]  Unknown   • Asthma [J45.909]  Unknown   • Hypertension [I10]  Unknown   • DJD (degenerative joint disease) [M19.90]  Yes      Resolved Hospital Problems   No resolved problems to display.         Assessment/Plan     1. Status post 4/13/2021 left total knee arthroplasty revision  2. Acute hypoxemic and hypercapnic respiratory failure I agree with Dr. Kelly she may well have some chronic hypoxia and hypercapnia her relatively minimal drop in pH for the degree of hypercapnia suggest possible chronicity and the elevated serum bicarbonate would suggest this as well.  I think she has atelectasis I am not sure whether there is a little bit of heart failure.  And I think it is very likely she has sleep apnea contributing to her symptoms.  She probably also has some underlying COPD with her smoking history.  I am going to increase bronchodilators add some hypertonic saline see if we can mobilize some secretions we will add OPEP  as well to help with secretions.  Continue incentive spirometry, wean O2 as tolerated.  Unfortunately have not been able to make much progress in weaning her O2 overnight.   I really think it was quite early for anything such as PE but were not making progress I think probably have to consider going ahead and doing a CT scan to better evaluate mass we will get a CT angiogram to help take PE out of the equation.  Regularly now that she is complaining of right calf pain  3. Atelectasis bilateral pulmonary hygiene as above  4. Right calf tenderness with her hypoxia and ongoing get lower extremity Doppler rule out DVT  5. Morbid obesity with a BMI over 47 may even have obesity hypoventilation syndrome.  6. Diabetes mellitus type 2  7. Tobacco abuse she admitted to me today that she is actively smoking even though slightly still people otherwise she will not tell me how much she says I will chastised her too much if I know how much she is smoking.  Did talk with her about stopping smoking.  8. Probable COPD with her smoking history she probably should have some lung functions after discharge as an outpatient when she can do the testing   9. Probable sleep apnea she will need a sleep study after discharge.  If She desaturates tonight with sleep we may want to put her on a noninvasive ventilator          Plan for disposition:    Sonny Louis MD  04/15/21  15:09 EDT    Time:

## 2021-04-15 NOTE — PROGRESS NOTES
Continued Stay Note  [facility]     Patient Name: Jenn Dupont  MRN: [id number]  Today's Date: 4/15/2021    Admit Date: 4/13/2021    Discharge Plan     Row Name 04/15/21 1647       Plan    Plan  [facility] -- Accepted & Pre-cert Pending.    Patient/Family in Agreement with Plan  yes    Plan Comments  Spoke with Linda/[facility]. Patient may need transportation arranged at d/c. No other needs identified. Await SNF pre-cert.        Discharge Codes    No documentation.             Marycruz Abdalla RN

## 2021-04-15 NOTE — PROGRESS NOTES
Orthopaedic Surgery  Progress Note  4/15/2021    Patient Name:  Jenn Dupont  YOB: 1945  Age: 75 y.o.  Medical Records Number:  [id number]  Date of Admission: 4/13/2021    No complaintVitals:  Vitals:    04/14/21 1942 04/14/21 2028 04/14/21 2319 04/15/21 0302   BP:  110/72 129/81 124/65   BP Location:  Left arm Right arm Left arm   Patient Position:  Lying Lying Lying   Pulse: 79 78 88 74   Resp: 16 16 16 16   Temp:   97.2 °F (36.2 °C) 97.1 °F (36.2 °C)   TempSrc:   Oral Skin   SpO2: 93% 91% 92% 94%   Weight:       Height:       s except pain    LLE:  NVI, calf nontender, sensation intact  No signs of DVT    Incision: clean, no signs of infection    Lab Results (last 24 hours)     Procedure Component Value Units Date/Time    POC Glucose Once [[id number]]  (Abnormal) Collected: 04/15/21 0616    Specimen: Blood Updated: 04/15/21 0624     Glucose 153 mg/dL     Basic Metabolic Panel [[id number]]  (Abnormal) Collected: 04/15/21 0454    Specimen: Blood Updated: 04/15/21 0531     Glucose 171 mg/dL      BUN 26 mg/dL      Creatinine 0.69 mg/dL      Sodium 141 mmol/L      Potassium 4.0 mmol/L      Chloride 101 mmol/L      CO2 31.1 mmol/L      Calcium 8.1 mg/dL      eGFR  African Amer 101 mL/min/1.73      BUN/Creatinine Ratio 37.7     Anion Gap 8.9 mmol/L     Narrative:      GFR Normal >60  Chronic Kidney Disease <60  Kidney Failure <15      CBC & Differential [[id number]]  (Abnormal) Collected: 04/15/21 0454    Specimen: Blood Updated: 04/15/21 0514    Narrative:      The following orders were created for panel order CBC & Differential.  Procedure                               Abnormality         Status                     ---------                               -----------         ------                     CBC Auto Differential[[id number]]        Abnormal            Final result                 Please view results for these tests on the individual orders.    CBC Auto Differential [[id number]]  (Abnormal)  Collected: 04/15/21 0454    Specimen: Blood Updated: 04/15/21 0514     WBC 7.60 10*3/mm3      RBC 4.58 10*6/mm3      Hemoglobin 12.6 g/dL      Hematocrit 40.4 %      MCV 88.2 fL      MCH 27.5 pg      MCHC 31.2 g/dL      RDW 14.6 %      RDW-SD 46.7 fl      MPV 10.7 fL      Platelets 203 10*3/mm3      Neutrophil % 73.9 %      Lymphocyte % 15.9 %      Monocyte % 8.4 %      Eosinophil % 1.1 %      Basophil % 0.4 %      Immature Grans % 0.3 %      Neutrophils, Absolute 5.62 10*3/mm3      Lymphocytes, Absolute 1.21 10*3/mm3      Monocytes, Absolute 0.64 10*3/mm3      Eosinophils, Absolute 0.08 10*3/mm3      Basophils, Absolute 0.03 10*3/mm3      Immature Grans, Absolute 0.02 10*3/mm3      nRBC 0.0 /100 WBC     POC Glucose Once [[id number]]  (Abnormal) Collected: 04/14/21 2318    Specimen: Blood Updated: 04/14/21 2319     Glucose 169 mg/dL     POC Glucose Once [[id number]]  (Abnormal) Collected: 04/14/21 1619    Specimen: Blood Updated: 04/14/21 1626     Glucose 181 mg/dL           Assesment/Plan:    Procedures:  Left TKA  Postoperative Day: 2  Weightbearing Status:  WBAT with walker  DVT Prophylaxis:  ASA for DVT prophylaxis    Reason for Inpatient Admission:  I certify that this patient requires inpatient admission for greater than 2 midnights due to obesity with BMI > 35 with chronic manifestations including osteoarthritis of a weight-bearing joint    Disposition:  Will continue to follow along with lha and pulmonology    Rahat Spence  [facility]  [address] [place] [address]  [phone]    4/15/2021

## 2021-04-15 NOTE — PLAN OF CARE
Goal Outcome Evaluation:  Plan of Care Reviewed With: patient     Outcome Summary: Pt req asisst of 2 for all mobility and tfers today, also req assist of 2 for safe amb to chair, unable to nino exer this visit, RN brought pain meds during PT session as they were due, plans SNU at facility where she resides    Flory Lau PT Tech present    Patient was intermittently wearing a face mask during this therapy encounter. Therapist used appropriate personal protective equipment including eye protection, mask, and gloves.  Mask used was standard procedure mask. Appropriate PPE was worn during the entire therapy session. Hand hygiene was completed before and after therapy session. Patient is not in enhanced droplet precautions.

## 2021-04-15 NOTE — THERAPY TREATMENT NOTE
Patient Name: Jenn Dupont  : 1945    MRN: [id number]                              Today's Date: 4/15/2021       Admit Date: 2021    Visit Dx:     ICD-10-CM ICD-9-CM   1. Mechanical loosening of prosthetic knee, initial encounter (CMS/[facility])  T84.038A 996.41    Z96.659 V43.65     Patient Active Problem List   Diagnosis   • Aseptic loosening of prosthetic knee (CMS/[facility])   • DJD (degenerative joint disease)   • Coronary artery disease   • Diabetes mellitus (CMS/[facility])   • Asthma   • Hypertension     Past Medical History:   Diagnosis Date   • Asthma     ALLERGY INDUCED    • Coronary artery disease    • Diabetes mellitus (CMS/[facility])    • Elevated cholesterol    • Hypertension    • Knee pain, left    • Low back pain    • MI (myocardial infarction) (CMS/[facility])    • Pulmonary embolism (CMS/[facility])        • Shoulder pain, left    • Urinary frequency      Past Surgical History:   Procedure Laterality Date   • CHOLECYSTECTOMY     • CORONARY ANGIOPLASTY WITH STENT PLACEMENT     • HYSTERECTOMY     • TONSILLECTOMY     • TOTAL KNEE ARTHROPLASTY Left    • TOTAL KNEE ARTHROPLASTY Right      General Information     [facility] Name 04/15/21 1444          Physical Therapy Time and Intention    Document Type  therapy note (daily note)  -     Mode of Treatment  individual therapy;physical therapy  -[facility] Name 04/15/21 1444          General Information    Patient Profile Reviewed  yes  -     Existing Precautions/Restrictions  fall;oxygen therapy device and L/min 4L  -[facility] Name 04/15/21 1444          Cognition    Orientation Status (Cognition)  unable/difficult to assess very lethargic upon entry, stated she was just lying here so she was going to pack up and leave  -[facility] Name 04/15/21 1444          Safety Issues, Functional Mobility    Safety Issues Affecting Function (Mobility)  ability to follow commands;insight into deficits/self-awareness;judgment;positioning of assistive  device;problem-solving;safety precaution awareness;sequencing abilities  -     Impairments Affecting Function (Mobility)  balance;coordination;endurance/activity tolerance;postural/trunk control;range of motion (ROM);strength  -       User Key  (r) = Recorded By, (t) = Taken By, (c) = Cosigned By    Initials Name Provider Type    Whitney Atwood PTA Physical Therapy Assistant        Mobility     Row Name 04/15/21 1447          Bed Mobility    Supine-Sit [place] (Bed Mobility)  2 person assist;moderate assist (50% patient effort)  -     Row Name 04/15/21 1447          Bed-Chair Transfer    Bed-Chair [place] (Transfers)  2 person assist;moderate assist (50% patient effort)  -     Assistive Device (Bed-Chair Transfers)  walker, front-wheeled  -     Row Name 04/15/21 1447          Sit-Stand Transfer    Sit-Stand [place] (Transfers)  2 person assist;moderate assist (50% patient effort);verbal cues;nonverbal cues (demo/gesture)  -     Assistive Device (Sit-Stand Transfers)  walker, front-wheeled  -     Row Name 04/15/21 1447          Gait/Stairs (Locomotion)    [place] Level (Gait)  2 person assist;moderate assist (50% patient effort);maximum assist (25% patient effort)  -     Assistive Device (Gait)  walker, front-wheeled  -     Distance in Feet (Gait)  4 steps bed to chair , shuffling, initial stance knees flexed  -     Deviations/Abnormal Patterns (Gait)  antalgic;base of support, wide;patti decreased;festinating/shuffling  -     Bilateral Gait Deviations  forward flexed posture even leaning over wx-ed offered on safety  -       User Key  (r) = Recorded By, (t) = Taken By, (c) = Cosigned By    Initials Name Provider Type    Whitney Atwood PTA Physical Therapy Assistant        Obj/Interventions     Row Name 04/15/21 1449          Range of Motion Comprehensive    Comment, General Range of Motion  unable to measure pt too pnful to attempt flex  -     Row Name  04/15/21 1449          Motor Skills    Therapeutic Exercise  -- unable to nino, too fatigued after getting to chair  -       User Key  (r) = Recorded By, (t) = Taken By, (c) = Cosigned By    Initials Name Provider Type    Whitney Atwood PTA Physical Therapy Assistant        Goals/Plan    No documentation.       Clinical Impression     Row Name 04/15/21 1451          Pain Scale: Numbers Pre/Post-Treatment    Pretreatment Pain Rating  9/10  -JM     Posttreatment Pain Rating  10/10  -JM     Pain Location - Side  Left  -     Pain Location  knee  -     Pain Intervention(s)  Medication (See MAR);Repositioned;Cold applied;Rest  -     Row Name 04/15/21 1451          Plan of Care Review    Plan of Care Reviewed With  patient  -     Outcome Summary  Pt req asisst of 2 for all mobility and tfers today, also req assist of 2 for safe amb to chair, unable to nino exer this visit, RN brought pain meds during PT session as they were due, plans SNU at facility where she resides  -     Row Name 04/15/21 1451          Therapy Assessment/Plan (PT)    Rehab Potential (PT)  fair, will monitor progress closely  -     Criteria for Skilled Interventions Met (PT)  yes  -     Row Name 04/15/21 1451          Vital Signs    Pre SpO2 (%)  91  -     O2 Delivery Pre Treatment  supplemental O2 4L  -     Row Name 04/15/21 1451          Positioning and Restraints    Pre-Treatment Position  in bed  -     Post Treatment Position  chair  -     In Chair  reclined;call light within reach;encouraged to call for assist;exit alarm on;with nsg  -       User Key  (r) = Recorded By, (t) = Taken By, (c) = Cosigned By    Initials Name Provider Type    Whitney Atwood PTA Physical Therapy Assistant        Outcome Measures     Row Name 04/15/21 1453          How much help from another person do you currently need...    Turning from your back to your side while in flat bed without using bedrails?  2  -     Moving from lying on  back to sitting on the side of a flat bed without bedrails?  2  -JM     Moving to and from a bed to a chair (including a wheelchair)?  2  -JM     Standing up from a chair using your arms (e.g., wheelchair, bedside chair)?  2  -JM     Climbing 3-5 steps with a railing?  1  -JM     To walk in hospital room?  2  -JM     AM-PAC 6 Clicks Score (PT)  11  -       User Key  (r) = Recorded By, (t) = Taken By, (c) = Cosigned By    Initials Name Provider Type    Whitney Atwood PTA Physical Therapy Assistant        Physical Therapy Education                 Title: PT OT SLP Therapies (In Progress)     Topic: Physical Therapy (In Progress)     Point: Mobility training (In Progress)     Learning Progress Summary           Patient Acceptance, E,TB,D, NR by JEFFREY at 4/15/2021 1454    Acceptance, TB, DU,NR by NIKA at 4/14/2021 1212                   Point: Home exercise program (In Progress)     Learning Progress Summary           Patient Acceptance, E,TB,D, NR by JEFFREY at 4/15/2021 1454    Acceptance, TB, DU,NR by NIKA at 4/14/2021 1212                   Point: Body mechanics (In Progress)     Learning Progress Summary           Patient Acceptance, E,TB,D, NR by JEFFREY at 4/15/2021 1454    Acceptance, TB, DU,NR by NIKA at 4/14/2021 1212                   Point: Precautions (In Progress)     Learning Progress Summary           Patient Acceptance, E,TB,D, NR by JEFFREY at 4/15/2021 1454    Acceptance, TB, DU,NR by NIKA at 4/14/2021 1212                               User Key     Initials Effective Dates Name Provider Type Discipline    JEFFREY 03/07/18 -  Whitney Morgan PTA Physical Therapy Assistant PT    NIKA 10/25/19 -  Marilee Jiménez PT Physical Therapist PT              PT Recommendation and Plan     Plan of Care Reviewed With: patient  Outcome Summary: Pt req asisst of 2 for all mobility and tfers today, also req assist of 2 for safe amb to chair, unable to nion exer this visit, RN brought pain meds during PT session as they were due, plans SNU at  facility where she resides     Time Calculation:   PT Charges     Row Name 04/15/21 1443             Time Calculation    Start Time  1220  -JEFFREY      Stop Time  1251  -JEFFREY      Time Calculation (min)  31 min  -JEFFREY      PT Received On  04/15/21  -JEFFREY      PT - Next Appointment  04/16/21  -JEFFREY        User Key  (r) = Recorded By, (t) = Taken By, (c) = Cosigned By    Initials Name Provider Type    Whitney Atwood PTA Physical Therapy Assistant        Therapy Charges for Today     Code Description Service Date Service Provider Modifiers Qty    14778629349 HC PT THER PROC EA 15 MIN 4/15/2021 Whitney Morgan PTA GP 2    35978039021 HC PT THER SUPP EA 15 MIN 4/15/2021 Whitney Morgan PTA GP 2          PT G-Codes  Outcome Measure Options: AM-PAC 6 Clicks Basic Mobility (PT)  AM-PAC 6 Clicks Score (PT): 11    Whitney Morgan PTA  4/15/2021

## 2021-04-15 NOTE — PROGRESS NOTES
"DAILY PROGRESS NOTE  [facility]    Patient Identification:  Name: Jenn Dupont  Age: 75 y.o.  Sex: female  :  1945  MRN: [id number]         Primary Care Physician: Nima Diop MD    Subjective:  Interval History:She complains of pain.  Short of air on 4 L O 2 .    Objective:    Scheduled Meds:allopurinol, 100 mg, Oral, Daily  aspirin, 325 mg, Oral, BID With Meals  atorvastatin, 20 mg, Oral, Daily  ferrous sulfate, 325 mg, Oral, Daily With Breakfast  insulin lispro, 0-7 Units, Subcutaneous, TID AC  ipratropium-albuterol, 3 mL, Nebulization, 4x Daily - RT  metoprolol tartrate, 25 mg, Oral, Nightly  mupirocin, , Each Nare, BID  potassium chloride, 10 mEq, Oral, Daily  sodium chloride, 3 mL, Intravenous, Q12H  sodium chloride, 4 mL, Nebulization, BID - RT  triamterene-hydrochlorothiazide, 1 tablet, Oral, Daily  vilazodone, 40 mg, Oral, Daily      Continuous Infusions:     Vital signs in last 24 hours:  Temp:  [97.1 °F (36.2 °C)-98.2 °F (36.8 °C)] 98.1 °F (36.7 °C)  Heart Rate:  [70-88] 81  Resp:  [16-18] 16  BP: (110-129)/(63-81) 129/74    Intake/Output:    Intake/Output Summary (Last 24 hours) at 4/15/2021 1227  Last data filed at 4/15/2021 0900  Gross per 24 hour   Intake 720 ml   Output 975 ml   Net -255 ml       Exam:  /74 (BP Location: Left arm, Patient Position: Lying)   Pulse 81   Temp 98.1 °F (36.7 °C) (Skin)   Resp 16   Ht 152.4 cm (60\")   Wt 110 kg (243 lb 6.2 oz)   SpO2 94%   BMI 47.53 kg/m²     General Appearance:    Alert, cooperative, no distress   Head:    Normocephalic, without obvious abnormality, atraumatic   Eyes:       Throat:   Lips, tongue, gums normal   Neck:   Supple, symmetrical, trachea midline, no JVD   Lungs:     Clear to auscultation bilaterally, respirations unlabored   Chest Wall:    No tenderness or deformity    Heart:    Regular rate and rhythm, S1 and S2 normal, no murmur,no  Rub or gallop   Abdomen:     Soft, nontender, bowel sounds " active, no masses, no organomegaly    Extremities:   Extremities normal, left knee surgical changes, no cyanosis or edema   Pulses:      Skin:   Skin is warm and dry,  no rashes or palpable lesions   Neurologic:   no focal deficits noted      Lab Results (last 72 hours)     Procedure Component Value Units Date/Time    POC Glucose Once [[id number]]  (Abnormal) Collected: 04/14/21 1032    Specimen: Blood Updated: 04/14/21 1037     Glucose 292 mg/dL     Basic Metabolic Panel [[id number]]  (Abnormal) Collected: 04/14/21 0640    Specimen: Blood Updated: 04/14/21 0745     Glucose 255 mg/dL      BUN 19 mg/dL      Creatinine 0.71 mg/dL      Sodium 141 mmol/L      Potassium 4.3 mmol/L      Chloride 101 mmol/L      CO2 31.5 mmol/L      Calcium 9.0 mg/dL      eGFR  African Amer 98 mL/min/1.73      BUN/Creatinine Ratio 26.8     Anion Gap 8.5 mmol/L     Narrative:      GFR Normal >60  Chronic Kidney Disease <60  Kidney Failure <15      CBC (No Diff) [[id number]]  (Abnormal) Collected: 04/14/21 0640    Specimen: Blood Updated: 04/14/21 0711     WBC 10.48 10*3/mm3      RBC 5.05 10*6/mm3      Hemoglobin 14.0 g/dL      Hematocrit 45.4 %      MCV 89.9 fL      MCH 27.7 pg      MCHC 30.8 g/dL      RDW 15.2 %      RDW-SD 50.2 fl      MPV 10.5 fL      Platelets 214 10*3/mm3     POC Glucose Once [[id number]]  (Abnormal) Collected: 04/14/21 0649    Specimen: Blood Updated: 04/14/21 0652     Glucose 261 mg/dL     Blood Gas, Arterial - [[id number]]  (Abnormal) Collected: 04/13/21 2332    Specimen: Arterial Blood Updated: 04/13/21 2337     Site Arterial: right radial     Moy's Test Positive     pH, Arterial 7.327 pH units      pCO2, Arterial 57.8 mm Hg      Comment: Critical:Notify RAMIREZ GONZALEZ (13-Apr-21 23:35:44)Read back ok        pO2, Arterial 87.2 mm Hg      HCO3, Arterial 30.2 mmol/L      Base Excess, Arterial 2.6 mmol/L      O2 Saturation Calculated 95.6 %      Barometric Pressure for Blood Gas 751.8 mmHg      Modality HFNC     Flow  Rate 6 lpm      Rate 18 Breaths/minute     BNP [[id number]]  (Normal) Collected: 04/13/21 2240    Specimen: Blood Updated: 04/13/21 2330     proBNP 125.2 pg/mL     Narrative:      Among patients with dyspnea, NT-proBNP is highly sensitive for the detection of acute congestive heart failure. In addition NT-proBNP of <300 pg/ml effectively rules out acute congestive heart failure with 99% negative predictive value.    Results may be falsely decreased if patient taking Biotin.      Troponin [[id number]]  (Normal) Collected: 04/13/21 2240    Specimen: Blood Updated: 04/13/21 2330     Troponin T <0.010 ng/mL     Narrative:      Troponin T Reference Range:  <= 0.03 ng/mL-   Negative for AMI  >0.03 ng/mL-     Abnormal for myocardial necrosis.  Clinicians would have to utilize clinical acumen, EKG, Troponin and serial changes to determine if it is an Acute Myocardial Infarction or myocardial injury due to an underlying chronic condition.       Results may be falsely decreased if patient taking Biotin.      Comprehensive Metabolic Panel [[id number]]  (Abnormal) Collected: 04/13/21 2240    Specimen: Blood Updated: 04/13/21 2325     Glucose 212 mg/dL      BUN 21 mg/dL      Creatinine 0.68 mg/dL      Sodium 138 mmol/L      Potassium 3.5 mmol/L      Chloride 98 mmol/L      CO2 30.8 mmol/L      Calcium 8.8 mg/dL      Total Protein 6.7 g/dL      Albumin 3.50 g/dL      ALT (SGPT) 60 U/L      AST (SGOT) 77 U/L      Alkaline Phosphatase 122 U/L      Total Bilirubin 0.4 mg/dL      eGFR  African Amer 103 mL/min/1.73      Globulin 3.2 gm/dL      A/G Ratio 1.1 g/dL      BUN/Creatinine Ratio 30.9     Anion Gap 9.2 mmol/L     Narrative:      GFR Normal >60  Chronic Kidney Disease <60  Kidney Failure <15      CBC & Differential [[id number]]  (Abnormal) Collected: 04/13/21 2240    Specimen: Blood Updated: 04/13/21 2320    Narrative:      The following orders were created for panel order CBC & Differential.  Procedure                                Abnormality         Status                     ---------                               -----------         ------                     CBC Auto Differential[[id number]]        Abnormal            Final result                 Please view results for these tests on the individual orders.    CBC Auto Differential [[id number]]  (Abnormal) Collected: 04/13/21 2240    Specimen: Blood Updated: 04/13/21 2320     WBC 8.26 10*3/mm3      RBC 5.09 10*6/mm3      Hemoglobin 13.6 g/dL      Hematocrit 43.1 %      MCV 84.7 fL      MCH 26.7 pg      MCHC 31.6 g/dL      RDW 14.9 %      RDW-SD 45.3 fl      MPV 10.6 fL      Platelets 231 10*3/mm3      Neutrophil % 93.3 %      Lymphocyte % 4.1 %      Monocyte % 2.1 %      Eosinophil % 0.0 %      Basophil % 0.1 %      Immature Grans % 0.4 %      Neutrophils, Absolute 7.71 10*3/mm3      Lymphocytes, Absolute 0.34 10*3/mm3      Monocytes, Absolute 0.17 10*3/mm3      Eosinophils, Absolute 0.00 10*3/mm3      Basophils, Absolute 0.01 10*3/mm3      Immature Grans, Absolute 0.03 10*3/mm3      nRBC 0.0 /100 WBC     POC Glucose Once [[id number]]  (Abnormal) Collected: 04/13/21 2114    Specimen: Blood Updated: 04/13/21 2135     Glucose 207 mg/dL     POC Glucose Once [[id number]]  (Abnormal) Collected: 04/13/21 1643    Specimen: Blood Updated: 04/13/21 1649     Glucose 205 mg/dL     COVID PRE-OP / PRE-PROCEDURE SCREENING ORDER (NO ISOLATION) - Swab, Nasopharynx [[id number]]  (Normal) Collected: 04/13/21 0939    Specimen: Swab from Nasopharynx Updated: 04/13/21 1038    Narrative:      The following orders were created for panel order COVID PRE-OP / PRE-PROCEDURE SCREENING ORDER (NO ISOLATION) - Swab, Nasopharynx.  Procedure                               Abnormality         Status                     ---------                               -----------         ------                     COVID-19,[facility]...[[id number]]  Normal              Final result                 Please view results for these  tests on the individual orders.    COVID-19,[facility] CEPHEID, NP SWAB IN TRANSPORT MEDIA 8-12 HR TAT - Swab, Nasopharynx [[id number]]  (Normal) Collected: 04/13/21 0939    Specimen: Swab from Nasopharynx Updated: 04/13/21 1038     COVID19 Not Detected    Narrative:      Fact sheet for providers: https://www.fda.gov/media/417949/download     Fact sheet for patients: https://www.fda.gov/media/511586/download    POC Glucose Once [[id number]]  (Abnormal) Collected: 04/13/21 1024    Specimen: Blood Updated: 04/13/21 1027     Glucose 132 mg/dL     SCANNED - LABS [[id number]] Resulted: 04/13/21     Updated: 04/12/21 1545        Data Review:  Results from last 7 days   Lab Units 04/15/21  0454 04/14/21  0640 04/13/21  2240   SODIUM mmol/L 141 141 138   POTASSIUM mmol/L 4.0 4.3 3.5   CHLORIDE mmol/L 101 101 98   CO2 mmol/L 31.1* 31.5* 30.8*   BUN mg/dL 26* 19 21   CREATININE mg/dL 0.69 0.71 0.68   GLUCOSE mg/dL 171* 255* 212*   CALCIUM mg/dL 8.1* 9.0 8.8     Results from last 7 days   Lab Units 04/15/21  0454 04/14/21  0640 04/13/21  2240   WBC 10*3/mm3 7.60 10.48 8.26   HEMOGLOBIN g/dL 12.6 14.0 13.6   HEMATOCRIT % 40.4 45.4 43.1   PLATELETS 10*3/mm3 203 214 231             Lab Results   Lab Value Date/Time    TROPONINT <0.010 04/13/2021 2240         Results from last 7 days   Lab Units 04/13/21  2240   ALK PHOS U/L 122*   BILIRUBIN mg/dL 0.4   ALT (SGPT) U/L 60*   AST (SGOT) U/L 77*             Glucose   Date/Time Value Ref Range Status   04/15/2021 1048 209 (H) 70 - 130 mg/dL Final   04/15/2021 0616 153 (H) 70 - 130 mg/dL Final   04/14/2021 2318 169 (H) 70 - 130 mg/dL Final   04/14/2021 1619 181 (H) 70 - 130 mg/dL Final   04/14/2021 1032 292 (H) 70 - 130 mg/dL Final   04/14/2021 0649 261 (H) 70 - 130 mg/dL Final   04/13/2021 2114 207 (H) 70 - 130 mg/dL Final   04/13/2021 1643 205 (H) 70 - 130 mg/dL Final           Past Medical History:   Diagnosis Date   • Asthma     ALLERGY INDUCED    • Coronary artery disease    •  Diabetes mellitus (CMS/[facility])    • Elevated cholesterol    • Hypertension    • Knee pain, left    • Low back pain    • MI (myocardial infarction) (CMS/[facility]) 2013   • Pulmonary embolism (CMS/[facility])     2009   • Shoulder pain, left    • Urinary frequency        Assessment:  Active Hospital Problems    Diagnosis  POA   • **Aseptic loosening of prosthetic knee (CMS/[facility]) [T84.038A, Z96.659]  Not Applicable   • Coronary artery disease [I25.10]  Unknown   • Diabetes mellitus (CMS/[facility]) [E11.9]  Unknown   • Asthma [J45.909]  Unknown   • Hypertension [I10]  Unknown   • DJD (degenerative joint disease) [M19.90]  Yes      Resolved Hospital Problems   No resolved problems to display.       Plan:  Continue current RX. Will follow.Holding some BP meds. Sliding scale insulin.  Will need SNU for rehab.    Booker Acosta MD  4/15/2021  12:27 EDT

## 2021-04-15 NOTE — PLAN OF CARE
Goal Outcome Evaluation:  Plan of Care Reviewed With: patient  Progress: improving  Outcome Summary: still needing 02 4 Liters, sats 90% to 92%. if require more then 4L, ABG's to be done and called to pulmonary, doing well with one percocet, voiding per purewick, more alert and awake, buckling to left leg with PT yesterday, able to sit in chair. Diabetic, controlled with S/C.

## 2021-04-16 ENCOUNTER — APPOINTMENT (OUTPATIENT)
Dept: CARDIOLOGY | Facility: HOSPITAL | Age: 76
End: 2021-04-16

## 2021-04-16 PROBLEM — J96.21 ACUTE ON CHRONIC RESPIRATORY FAILURE WITH HYPOXIA AND HYPERCAPNIA (HCC): Status: ACTIVE | Noted: 2021-04-16

## 2021-04-16 PROBLEM — J96.22 ACUTE ON CHRONIC RESPIRATORY FAILURE WITH HYPOXIA AND HYPERCAPNIA (HCC): Status: ACTIVE | Noted: 2021-04-16

## 2021-04-16 LAB
ANION GAP SERPL CALCULATED.3IONS-SCNC: 6.2 MMOL/L (ref 5–15)
ARTERIAL PATENCY WRIST A: ABNORMAL
ARTERIAL PATENCY WRIST A: POSITIVE
ATMOSPHERIC PRESS: 749.2 MMHG
ATMOSPHERIC PRESS: 749.8 MMHG
BASE EXCESS BLDA CALC-SCNC: 7.7 MMOL/L (ref 0–2)
BASE EXCESS BLDA CALC-SCNC: 9.4 MMOL/L (ref 0–2)
BASOPHILS # BLD AUTO: 0.02 10*3/MM3 (ref 0–0.2)
BASOPHILS NFR BLD AUTO: 0.3 % (ref 0–1.5)
BDY SITE: ABNORMAL
BDY SITE: ABNORMAL
BH CV LOWER VASCULAR LEFT COMMON FEMORAL AUGMENT: NORMAL
BH CV LOWER VASCULAR LEFT COMMON FEMORAL COMPETENT: NORMAL
BH CV LOWER VASCULAR LEFT COMMON FEMORAL COMPRESS: NORMAL
BH CV LOWER VASCULAR LEFT COMMON FEMORAL PHASIC: NORMAL
BH CV LOWER VASCULAR LEFT COMMON FEMORAL SPONT: NORMAL
BH CV LOWER VASCULAR LEFT DISTAL FEMORAL COMPRESS: NORMAL
BH CV LOWER VASCULAR LEFT GASTRONEMIUS COMPRESS: NORMAL
BH CV LOWER VASCULAR LEFT GREATER SAPH AK COMPRESS: NORMAL
BH CV LOWER VASCULAR LEFT GREATER SAPH BK COMPRESS: NORMAL
BH CV LOWER VASCULAR LEFT LESSER SAPH COMPRESS: NORMAL
BH CV LOWER VASCULAR LEFT MID FEMORAL AUGMENT: NORMAL
BH CV LOWER VASCULAR LEFT MID FEMORAL COMPETENT: NORMAL
BH CV LOWER VASCULAR LEFT MID FEMORAL COMPRESS: NORMAL
BH CV LOWER VASCULAR LEFT MID FEMORAL PHASIC: NORMAL
BH CV LOWER VASCULAR LEFT MID FEMORAL SPONT: NORMAL
BH CV LOWER VASCULAR LEFT PERONEAL COMPRESS: NORMAL
BH CV LOWER VASCULAR LEFT POPLITEAL AUGMENT: NORMAL
BH CV LOWER VASCULAR LEFT POPLITEAL COMPETENT: NORMAL
BH CV LOWER VASCULAR LEFT POPLITEAL COMPRESS: NORMAL
BH CV LOWER VASCULAR LEFT POPLITEAL PHASIC: NORMAL
BH CV LOWER VASCULAR LEFT POPLITEAL SPONT: NORMAL
BH CV LOWER VASCULAR LEFT POSTERIOR TIBIAL COMPRESS: NORMAL
BH CV LOWER VASCULAR LEFT PROFUNDA FEMORAL COMPRESS: NORMAL
BH CV LOWER VASCULAR LEFT PROXIMAL FEMORAL COMPRESS: NORMAL
BH CV LOWER VASCULAR LEFT SAPHENOFEMORAL JUNCTION COMPRESS: NORMAL
BH CV LOWER VASCULAR RIGHT COMMON FEMORAL AUGMENT: NORMAL
BH CV LOWER VASCULAR RIGHT COMMON FEMORAL COMPETENT: NORMAL
BH CV LOWER VASCULAR RIGHT COMMON FEMORAL COMPRESS: NORMAL
BH CV LOWER VASCULAR RIGHT COMMON FEMORAL PHASIC: NORMAL
BH CV LOWER VASCULAR RIGHT COMMON FEMORAL SPONT: NORMAL
BH CV LOWER VASCULAR RIGHT DISTAL FEMORAL COMPRESS: NORMAL
BH CV LOWER VASCULAR RIGHT GASTRONEMIUS COMPRESS: NORMAL
BH CV LOWER VASCULAR RIGHT GREATER SAPH AK COMPRESS: NORMAL
BH CV LOWER VASCULAR RIGHT GREATER SAPH BK COMPRESS: NORMAL
BH CV LOWER VASCULAR RIGHT LESSER SAPH COMPRESS: NORMAL
BH CV LOWER VASCULAR RIGHT MID FEMORAL AUGMENT: NORMAL
BH CV LOWER VASCULAR RIGHT MID FEMORAL COMPETENT: NORMAL
BH CV LOWER VASCULAR RIGHT MID FEMORAL COMPRESS: NORMAL
BH CV LOWER VASCULAR RIGHT MID FEMORAL PHASIC: NORMAL
BH CV LOWER VASCULAR RIGHT MID FEMORAL SPONT: NORMAL
BH CV LOWER VASCULAR RIGHT PERONEAL COMPRESS: NORMAL
BH CV LOWER VASCULAR RIGHT POPLITEAL AUGMENT: NORMAL
BH CV LOWER VASCULAR RIGHT POPLITEAL COMPETENT: NORMAL
BH CV LOWER VASCULAR RIGHT POPLITEAL COMPRESS: NORMAL
BH CV LOWER VASCULAR RIGHT POPLITEAL PHASIC: NORMAL
BH CV LOWER VASCULAR RIGHT POPLITEAL SPONT: NORMAL
BH CV LOWER VASCULAR RIGHT POSTERIOR TIBIAL COMPRESS: NORMAL
BH CV LOWER VASCULAR RIGHT PROFUNDA FEMORAL COMPRESS: NORMAL
BH CV LOWER VASCULAR RIGHT PROXIMAL FEMORAL COMPRESS: NORMAL
BH CV LOWER VASCULAR RIGHT SAPHENOFEMORAL JUNCTION COMPRESS: NORMAL
BUN SERPL-MCNC: 19 MG/DL (ref 8–23)
BUN/CREAT SERPL: 40.4 (ref 7–25)
CALCIUM SPEC-SCNC: 9.1 MG/DL (ref 8.6–10.5)
CHLORIDE SERPL-SCNC: 99 MMOL/L (ref 98–107)
CO2 SERPL-SCNC: 33.8 MMOL/L (ref 22–29)
CREAT SERPL-MCNC: 0.47 MG/DL (ref 0.57–1)
DEPRECATED RDW RBC AUTO: 50 FL (ref 37–54)
EOSINOPHIL # BLD AUTO: 0.09 10*3/MM3 (ref 0–0.4)
EOSINOPHIL NFR BLD AUTO: 1.2 % (ref 0.3–6.2)
ERYTHROCYTE [DISTWIDTH] IN BLOOD BY AUTOMATED COUNT: 15.1 % (ref 12.3–15.4)
GAS FLOW AIRWAY: 3 LPM
GFR SERPL CREATININE-BSD FRML MDRD: >150 ML/MIN/1.73
GLUCOSE BLDC GLUCOMTR-MCNC: 123 MG/DL (ref 70–130)
GLUCOSE BLDC GLUCOMTR-MCNC: 135 MG/DL (ref 70–130)
GLUCOSE BLDC GLUCOMTR-MCNC: 150 MG/DL (ref 70–130)
GLUCOSE BLDC GLUCOMTR-MCNC: 163 MG/DL (ref 70–130)
GLUCOSE BLDC GLUCOMTR-MCNC: 168 MG/DL (ref 70–130)
GLUCOSE SERPL-MCNC: 133 MG/DL (ref 65–99)
HCO3 BLDA-SCNC: 36.5 MMOL/L (ref 22–28)
HCO3 BLDA-SCNC: 37.5 MMOL/L (ref 22–28)
HCT VFR BLD AUTO: 43 % (ref 34–46.6)
HGB BLD-MCNC: 13.1 G/DL (ref 12–15.9)
IMM GRANULOCYTES # BLD AUTO: 0.02 10*3/MM3 (ref 0–0.05)
IMM GRANULOCYTES NFR BLD AUTO: 0.3 % (ref 0–0.5)
INHALED O2 CONCENTRATION: 40 %
LYMPHOCYTES # BLD AUTO: 0.94 10*3/MM3 (ref 0.7–3.1)
LYMPHOCYTES NFR BLD AUTO: 12.3 % (ref 19.6–45.3)
MCH RBC QN AUTO: 27.4 PG (ref 26.6–33)
MCHC RBC AUTO-ENTMCNC: 30.5 G/DL (ref 31.5–35.7)
MCV RBC AUTO: 90 FL (ref 79–97)
MODALITY: ABNORMAL
MODALITY: ABNORMAL
MONOCYTES # BLD AUTO: 0.59 10*3/MM3 (ref 0.1–0.9)
MONOCYTES NFR BLD AUTO: 7.7 % (ref 5–12)
NEUTROPHILS NFR BLD AUTO: 6.01 10*3/MM3 (ref 1.7–7)
NEUTROPHILS NFR BLD AUTO: 78.2 % (ref 42.7–76)
NRBC BLD AUTO-RTO: 0 /100 WBC (ref 0–0.2)
O2 A-A PPRESDIFF RESPIRATORY: 0.3 MMHG
PCO2 BLDA: 63.9 MM HG (ref 35–45)
PCO2 BLDA: 68.4 MM HG (ref 35–45)
PEEP RESPIRATORY: 6 CM[H2O]
PH BLDA: 7.33 PH UNITS (ref 7.35–7.45)
PH BLDA: 7.38 PH UNITS (ref 7.35–7.45)
PLATELET # BLD AUTO: 171 10*3/MM3 (ref 140–450)
PMV BLD AUTO: 10.6 FL (ref 6–12)
PO2 BLDA: 53.1 MM HG (ref 80–100)
PO2 BLDA: 64 MM HG (ref 80–100)
POTASSIUM SERPL-SCNC: 3.9 MMOL/L (ref 3.5–5.2)
RBC # BLD AUTO: 4.78 10*6/MM3 (ref 3.77–5.28)
SAO2 % BLDCOA: 83.2 % (ref 92–99)
SAO2 % BLDCOA: 90.6 % (ref 92–99)
SET MECH RESP RATE: 10
SODIUM SERPL-SCNC: 139 MMOL/L (ref 136–145)
TOTAL RATE: 16 BREATHS/MINUTE
TOTAL RATE: 20 BREATHS/MINUTE
VENTILATOR MODE: ABNORMAL
VT ON VENT VENT: 0.44 ML
WBC # BLD AUTO: 7.67 10*3/MM3 (ref 3.4–10.8)

## 2021-04-16 PROCEDURE — 82803 BLOOD GASES ANY COMBINATION: CPT

## 2021-04-16 PROCEDURE — 94799 UNLISTED PULMONARY SVC/PX: CPT

## 2021-04-16 PROCEDURE — 36600 WITHDRAWAL OF ARTERIAL BLOOD: CPT

## 2021-04-16 PROCEDURE — 97110 THERAPEUTIC EXERCISES: CPT

## 2021-04-16 PROCEDURE — 85025 COMPLETE CBC W/AUTO DIFF WBC: CPT | Performed by: HOSPITALIST

## 2021-04-16 PROCEDURE — 93970 EXTREMITY STUDY: CPT

## 2021-04-16 PROCEDURE — 94660 CPAP INITIATION&MGMT: CPT

## 2021-04-16 PROCEDURE — 80048 BASIC METABOLIC PNL TOTAL CA: CPT | Performed by: HOSPITALIST

## 2021-04-16 PROCEDURE — 82962 GLUCOSE BLOOD TEST: CPT

## 2021-04-16 PROCEDURE — 25010000002 MORPHINE PER 10 MG: Performed by: ORTHOPAEDIC SURGERY

## 2021-04-16 RX ADMIN — IPRATROPIUM BROMIDE AND ALBUTEROL SULFATE 3 ML: 2.5; .5 SOLUTION RESPIRATORY (INHALATION) at 11:28

## 2021-04-16 RX ADMIN — ALLOPURINOL 100 MG: 100 TABLET ORAL at 09:34

## 2021-04-16 RX ADMIN — VILAZODONE HYDROCHLORIDE 40 MG: 40 TABLET ORAL at 09:34

## 2021-04-16 RX ADMIN — FERROUS SULFATE TAB 325 MG (65 MG ELEMENTAL FE) 325 MG: 325 (65 FE) TAB at 09:34

## 2021-04-16 RX ADMIN — ATORVASTATIN CALCIUM 20 MG: 20 TABLET, FILM COATED ORAL at 08:10

## 2021-04-16 RX ADMIN — IPRATROPIUM BROMIDE AND ALBUTEROL SULFATE 3 ML: 2.5; .5 SOLUTION RESPIRATORY (INHALATION) at 08:53

## 2021-04-16 RX ADMIN — METOPROLOL TARTRATE 25 MG: 25 TABLET, FILM COATED ORAL at 20:41

## 2021-04-16 RX ADMIN — ASPIRIN 325 MG: 325 TABLET, COATED ORAL at 09:35

## 2021-04-16 RX ADMIN — DIAZEPAM 5 MG: 5 TABLET ORAL at 23:16

## 2021-04-16 RX ADMIN — TRIAMTERENE AND HYDROCHLOROTHIAZIDE 1 TABLET: 75; 50 TABLET ORAL at 09:34

## 2021-04-16 RX ADMIN — SODIUM CHLORIDE, PRESERVATIVE FREE 3 ML: 5 INJECTION INTRAVENOUS at 08:11

## 2021-04-16 RX ADMIN — Medication 4 ML: at 09:03

## 2021-04-16 RX ADMIN — ASPIRIN 325 MG: 325 TABLET, COATED ORAL at 17:11

## 2021-04-16 RX ADMIN — IPRATROPIUM BROMIDE AND ALBUTEROL SULFATE 3 ML: 2.5; .5 SOLUTION RESPIRATORY (INHALATION) at 15:00

## 2021-04-16 RX ADMIN — POTASSIUM CHLORIDE 10 MEQ: 750 TABLET, EXTENDED RELEASE ORAL at 08:10

## 2021-04-16 RX ADMIN — OXYCODONE HYDROCHLORIDE AND ACETAMINOPHEN 1 TABLET: 5; 325 TABLET ORAL at 14:38

## 2021-04-16 RX ADMIN — MORPHINE SULFATE 4 MG: 2 INJECTION, SOLUTION INTRAMUSCULAR; INTRAVENOUS at 09:35

## 2021-04-16 RX ADMIN — IPRATROPIUM BROMIDE AND ALBUTEROL SULFATE 3 ML: 2.5; .5 SOLUTION RESPIRATORY (INHALATION) at 19:58

## 2021-04-16 NOTE — PLAN OF CARE
Goal Outcome Evaluation:     Progress: no change  Outcome Summary: Pt arrived on floor, BIPAP tolerating well. no complaints of pain. dis to place but otherwise alert. will continue care.

## 2021-04-16 NOTE — NURSING NOTE
During bedside shift report, it was noted that the patient had removed her BIPAP and was not on O2. Sats 83% and patient was confused.  2L NC placed.  Attempted to convince patient to wear BIPAP.  Patient allowed NC to be placed but refused BIPAP at the time.  Discussed with monitor tech to call if sats decrease as patient is confused and removes equipment.  Reviewing chart.  Will re-assess and attempt BIPAP placement.

## 2021-04-16 NOTE — PROGRESS NOTES
LOS: 1 day   Patient Care Team:  Nima Diop MD as PCP - General  Nima Diop MD as PCP - Family Medicine    Subjective   Following up hypoxic respiratory failure patient was in bed she had noninvasive facemask on she just pulled the mask apart.  She wants her leg adjusted she wants the pillow under her knee.  I told her I did not think that was appropriate check with nursing she is not to have the pillow under her knee she just would not believe that nursing told her she keeps trying to pull it up under her knee.  She is quite paranoid.  She accused me and the nurse of talking about her acid yes we are that is why were standing here one on each side of your bed talking so that you can hear everything you are saying about you.  She has been apparently confused most of the night and day.  No focal deficits have been noted in her neurologic status.    Review of Systems:          Objective     Vital Signs  Vital Sign Min/Max for last 24 hours  Temp  Min: 97.3 °F (36.3 °C)  Max: 98.9 °F (37.2 °C)   BP  Min: 113/54  Max: 151/80   Pulse  Min: 80  Max: 99   Resp  Min: 16  Max: 22   SpO2  Min: 67 %  Max: 100 %   Flow (L/min)  Min: 2  Max: 4   No data recorded        Ventilator/Non-Invasive Ventilation Settings (From admission, onward) Comment    None                       Body mass index is 47.53 kg/m².  I/O last 3 completed shifts:  In: 480 [P.O.:480]  Out: 2175 [Urine:2175]  No intake/output data recorded.        Physical Exam:  General Appearance: Well-developed obese black female she is in bed I cannot say she is resting she keeps fighting to pull the pillow up under her knee and leg she does not want to let her knee go straight.  Eyes: Conjunctiva are clear and anicteric  ENT: Mucous membranes are moist no erythema no exudates she has a Mallampati 3 almost 4 airway  Neck: No adenopathy no jugular venous distension trachea midline neck is short and obese  Lungs: Clear no wheezes no rales no rhonchi  she is on noninvasive ventilation rate of 10 tidal volume 450 expiratory pressure 6 maximum inspiratory pressure 14 exhaled tidal volumes are running in the 3  range.  She is on 50% FiO2 with a saturation of 98%  Cardiac: Regular rate rhythm no murmur   Abdomen: Obese soft no palpable hepatosplenomegaly or masses  : Not examined  Musculoskeletal: Postop changes left knee she did not really complain of the right calf tenderness today  Skin: Warm dry no jaundice no petechiae  Neuro: She is oriented to person and she realizes she is in the hospital not oriented to time clearly very confused.  She is moving extremities she does not move that left leg a whole lot actually she is not vigorously moving either leg but she does move particularly the right leg she is definitely moving her arms well there is no facial symmetry  Extremities/P Vascular: No clubbing no cyanosis, the only edema is just about that left knee  palpable radial and dorsalis pedis pulses.  MSE: Confused and paranoid       Labs:  Results from last 7 days   Lab Units 04/16/21  0656 04/15/21  0454 04/14/21  0640 04/13/21  2240   GLUCOSE mg/dL 133* 171* 255* 212*   SODIUM mmol/L 139 141 141 138   POTASSIUM mmol/L 3.9 4.0 4.3 3.5   CO2 mmol/L 33.8* 31.1* 31.5* 30.8*   CHLORIDE mmol/L 99 101 101 98   ANION GAP mmol/L 6.2 8.9 8.5 9.2   CREATININE mg/dL 0.47* 0.69 0.71 0.68   BUN mg/dL 19 26* 19 21   BUN / CREAT RATIO  40.4* 37.7* 26.8* 30.9*   CALCIUM mg/dL 9.1 8.1* 9.0 8.8   ALK PHOS U/L  --   --   --  122*   TOTAL PROTEIN g/dL  --   --   --  6.7   ALT (SGPT) U/L  --   --   --  60*   AST (SGOT) U/L  --   --   --  77*   BILIRUBIN mg/dL  --   --   --  0.4   ALBUMIN g/dL  --   --   --  3.50   GLOBULIN gm/dL  --   --   --  3.2     Estimated Creatinine Clearance: 70.5 mL/min (A) (by C-G formula based on SCr of 0.47 mg/dL (L)).      Results from last 7 days   Lab Units 04/16/21  0656 04/15/21  0454 04/14/21  0640 04/13/21  2240   WBC 10*3/mm3 7.67 7.60  10.48 8.26   RBC 10*6/mm3 4.78 4.58 5.05 5.09   HEMOGLOBIN g/dL 13.1 12.6 14.0 13.6   HEMATOCRIT % 43.0 40.4 45.4 43.1   MCV fL 90.0 88.2 89.9 84.7   MCH pg 27.4 27.5 27.7 26.7   MCHC g/dL 30.5* 31.2* 30.8* 31.6   RDW % 15.1 14.6 15.2 14.9   RDW-SD fl 50.0 46.7 50.2 45.3   MPV fL 10.6 10.7 10.5 10.6   PLATELETS 10*3/mm3 171 203 214 231   NEUTROPHIL % % 78.2* 73.9  --  93.3*   LYMPHOCYTE % % 12.3* 15.9*  --  4.1*   MONOCYTES % % 7.7 8.4  --  2.1*   EOSINOPHIL % % 1.2 1.1  --  0.0*   BASOPHIL % % 0.3 0.4  --  0.1   IMM GRAN % % 0.3 0.3  --  0.4   NEUTROS ABS 10*3/mm3 6.01 5.62  --  7.71*   LYMPHS ABS 10*3/mm3 0.94 1.21  --  0.34*   MONOS ABS 10*3/mm3 0.59 0.64  --  0.17   EOS ABS 10*3/mm3 0.09 0.08  --  0.00   BASOS ABS 10*3/mm3 0.02 0.03  --  0.01   IMMATURE GRANS (ABS) 10*3/mm3 0.02 0.02  --  0.03   NRBC /100 WBC 0.0 0.0  --  0.0     Results from last 7 days   Lab Units 04/16/21  1122   PH, ARTERIAL pH units 7.377   PO2 ART mm Hg 64.0*   PCO2, ARTERIAL mm Hg 63.9*   HCO3 ART mmol/L 37.5*     Results from last 7 days   Lab Units 04/13/21  2240   TROPONIN T ng/mL <0.010     Results from last 7 days   Lab Units 04/13/21  2240   PROBNP pg/mL 125.2                 Microbiology Results (last 10 days)     Procedure Component Value - Date/Time    COVID PRE-OP / PRE-PROCEDURE SCREENING ORDER (NO ISOLATION) - Swab, Nasopharynx [[id number]]  (Normal) Collected: 04/13/21 0939    Lab Status: Final result Specimen: Swab from Nasopharynx Updated: 04/13/21 1038    Narrative:      The following orders were created for panel order COVID PRE-OP / PRE-PROCEDURE SCREENING ORDER (NO ISOLATION) - Swab, Nasopharynx.  Procedure                               Abnormality         Status                     ---------                               -----------         ------                     COVID-19, NATASHA IN-HOUSE...[[id number]]  Normal              Final result                 Please view results for these tests on the individual orders.     COVID-19, NATASHA IN-HOUSE CEPHEID, NP SWAB IN TRANSPORT MEDIA 8-12 HR TAT - Swab, Nasopharynx [[id number]]  (Normal) Collected: 04/13/21 0939    Lab Status: Final result Specimen: Swab from Nasopharynx Updated: 04/13/21 1038     COVID19 Not Detected    Narrative:      Fact sheet for providers: https://www.fda.gov/media/962608/download     Fact sheet for patients: https://www.fda.gov/media/000426/download              allopurinol, 100 mg, Oral, Daily  aspirin, 325 mg, Oral, BID With Meals  atorvastatin, 20 mg, Oral, Daily  ferrous sulfate, 325 mg, Oral, Daily With Breakfast  insulin lispro, 0-7 Units, Subcutaneous, TID AC  ipratropium-albuterol, 3 mL, Nebulization, 4x Daily - RT  metoprolol tartrate, 25 mg, Oral, Nightly  potassium chloride, 10 mEq, Oral, Daily  sodium chloride, 3 mL, Intravenous, Q12H  sodium chloride, 4 mL, Nebulization, BID - RT  triamterene-hydrochlorothiazide, 1 tablet, Oral, Daily  vilazodone, 40 mg, Oral, Daily           Diagnostics:  XR Chest 2 View    Result Date: 3/30/2021   DATE OF EXAM: 3/30/2021 1:45 PM  PROCEDURE: XR CHEST 2 VW-  INDICATIONS: preop; M25.50-Pain in unspecified joint; Z01.818-Encounter for other preprocedural examination  COMPARISON: No Comparisons Available  TECHNIQUE: PA and lateral views of the chest were obtained.  FINDINGS: Heart size within normal limits. Mild tortuosity descending thoracic aorta. No focal consolidation, pneumothorax, or large pleural effusion. Multilevel disc narrowing the thoracic spine.      No acute process.  Electronically Signed By-Piotr Hayes MD On:3/30/2021 2:05 PM This report was finalized on [id number] by  Piotr Hayes MD.    XR Knee 1 or 2 View Left    Result Date: 4/13/2021  EXAMINATION: 2 VIEWS OF THE LEFT KNEE  HISTORY: 75-year-old female status post total left knee arthroplasty  FINDINGS: AP and lateral radiographs of the left knee were obtained. Comparison is made to prior left knee radiographs dated 06/23/2015. There has been  interval revision of the left knee arthroplasty components with placement of femoral and tibial components that contain interfemoral shafts. Overlying skin staples are noted. There is no evidence for a fracture. Gas is seen within the surrounding soft tissues.      Status post recent revision of total left knee arthroplasty without evidence for an acute abnormality.  This report was finalized on 4/13/2021 8:51 PM by Dr. Shaka Montero M.D.      XR Chest 1 View    Result Date: 4/13/2021  Patient: YOBANI BLOUNT  Time Out: 22:53 Exam(s): FILM CXR 1 VIEW EXAM:   XR Chest, 1 View CLINICAL HISTORY:    hypoxia  Reason for exam: hypoxia. TECHNIQUE:   Frontal view of the chest. COMPARISON:   2 1 2016. FINDINGS:   Lungs:  Diffuse bilateral lung opacities with subpleural fissural thickening.   Pleural space:  No pneumothorax or significant pleural effusions.   Heart:  Mild cardiomegaly.   Mediastinum:  Unremarkable.   Bones joints:  Multilevel degenerative changes of the thoracic spine.   Vasculature:  Calcified thoracic aorta. IMPRESSION:       Mild CHF.     Electronically signed by Joseph Peña M.D. on 04-13-21 at 2253    Peripheral Block    Result Date: 4/13/2021  Shoaib Mcmillan MD     4/13/2021 12:24 PM Peripheral Block Pre-sedation assessment completed: 4/13/2021 12:20 PM Patient reassessed immediately prior to procedure Patient location during procedure: holding area Start time: 4/13/2021 12:20 PM Stop time: 4/13/2021 12:24 PM Reason for block: at surgeon's request and post-op pain management Performed by Anesthesiologist: Shoaib Mcmillan MD Preanesthetic Checklist Completed: patient identified, IV checked, site marked, risks and benefits discussed, surgical consent, monitors and equipment checked, pre-op evaluation and timeout performed Prep: Sterile barriers:cap, gloves and mask Prep: ChloraPrep Patient monitoring: blood pressure monitoring, continuous pulse oximetry and EKG Procedure Sedation:yes  Performed under: local infiltration Guidance:ultrasound guided ULTRASOUND INTERPRETATION.  Using ultrasound guidance a 21 G gauge needle was placed in close proximity to the femoral nerve, at which point, under ultrasound guidance anesthetic was injected in the area of the nerve and spread of the anesthesia was seen on ultrasound in close proximity thereto.  There were no abnormalities seen on ultrasound; a digital image was taken; and the patient tolerated the procedure with no complications. Images:still images obtained Laterality:left Block Type:femoral and adductor canal block Injection Technique:single-shot Needle Type:echogenic Resistance on Injection: none Medications Used: ropivacaine (NAROPIN) 0.5 % injection, 30 mL Post Assessment Injection Assessment: negative aspiration for heme, no paresthesia on injection and incremental injection Patient Tolerance:comfortable throughout block Complications:no Additional Notes Ultrasound Interpretation:  Using ultrasound guidance the needle was placed in close proximity to the femoral nerve and anesthetic was injected in the area of the femoral nerve and spread of the anesthetic was seen on ultrasound in close proximity thereto.  There were no abnormalities seen on ultrasound; a digital image was taken; and the patient tolerated the procedure with no complications.  Block placed for postoperative pain control per surgeon request.       I reviewed her CT scan of the chest no central pulmonary emboli definite atelectasis in both lung bases it would be impossible to exclude infiltrate but primarily atelectatic changes      Active Hospital Problems    Diagnosis  POA   • **Aseptic loosening of prosthetic knee (CMS/HCC) [T84.038A, Z96.659]  Not Applicable   • Coronary artery disease [I25.10]  Unknown   • Diabetes mellitus (CMS/HCC) [E11.9]  Unknown   • Asthma [J45.909]  Unknown   • Hypertension [I10]  Unknown   • DJD (degenerative joint disease) [M19.90]  Yes      Resolved  Hospital Problems   No resolved problems to display.         Assessment/Plan     1. Status post 4/13/2021 left total knee arthroplasty revision  2. Acute hypoxemic and hypercapnic respiratory failure I agree with Dr. Kelly she may well have some chronic hypoxia and hypercapnia her relatively minimal drop in pH for the degree of hypercapnia suggest possible chronicity and the elevated serum bicarbonate would suggest this as well.  CT angiogram no pulmonary embolus definitely significant bilateral atelectasis and I suspect this is the principal cause of her hypoxia  3. Atelectasis bilateral pulmonary hygiene as above, we really need to get her up moving going to be a challenge.  I cannot entirely exclude pneumonia but I do not think she has pneumonia at this time her white counts normal she is afebrile  4. Right calf tenderness with her ongoing hypoxia just notified me the preliminary Doppler was negative for DVT.  5. Morbid obesity with a BMI over 47 may even have obesity hypoventilation syndrome.  6. Diabetes mellitus type 2  7. Tobacco abuse she admitted to me  that she is actively smoking even though tells people otherwise she will not tell me how much she says I will chastised her too much if I know how much she is smoking.  Did talk with her about stopping smoking.  8. Probable COPD with her smoking history she probably should have some lung functions after discharge as an outpatient when she can do the testing   9. Probable sleep apnea she will need a sleep study after discharge.  I have adjusted her noninvasive ventilator for more minute ventilation and more backup ventilation if she gets oversedated because she is constantly asking for pain medication as well which I think is what is driving the problem.          Plan for disposition:    Sonny Louis MD  04/16/21  13:23 EDT    Time: I spent over 40 minutes with patient's care today

## 2021-04-16 NOTE — PLAN OF CARE
Goal Outcome Evaluation:  Plan of Care Reviewed With: patient     Outcome Summary: Pt with confusion and lethargy today after having increased CO2 on ABG and requiring BiPAP. Pt was able to participate in 10 reps of L TKA protocol exercises with heavy assist and pt requiring increased time, cuing and assistance to sit on EOB. PT will continue to follow to address strength, mobility and gait.Patient was intermittently wearing a face mask during this therapy encounter. Therapist used appropriate personal protective equipment including eye protection, mask, and gloves.  Mask used was standard procedure mask. Appropriate PPE was worn during the entire therapy session. Hand hygiene was completed before and after therapy session. Patient is not in enhanced droplet precautions.

## 2021-04-16 NOTE — THERAPY TREATMENT NOTE
Patient Name: Jenn Dupont  : 1945    MRN: [id number]                              Today's Date: 2021       Admit Date: 2021    Visit Dx:     ICD-10-CM ICD-9-CM   1. Mechanical loosening of prosthetic knee, initial encounter (CMS/HCC)  T84.038A 996.41    Z96.659 V43.65     Patient Active Problem List   Diagnosis   • Aseptic loosening of prosthetic knee (CMS/[facility])   • DJD (degenerative joint disease)   • Coronary artery disease   • Diabetes mellitus (CMS/[facility])   • Asthma   • Hypertension   • Acute on chronic respiratory failure with hypoxia and hypercapnia (CMS/[facility])     Past Medical History:   Diagnosis Date   • Asthma     ALLERGY INDUCED    • Coronary artery disease    • Diabetes mellitus (CMS/[facility])    • Elevated cholesterol    • Hypertension    • Knee pain, left    • Low back pain    • MI (myocardial infarction) (CMS/[facility])    • Pulmonary embolism (CMS/HCC)        • Shoulder pain, left    • Urinary frequency      Past Surgical History:   Procedure Laterality Date   • CHOLECYSTECTOMY     • CORONARY ANGIOPLASTY WITH STENT PLACEMENT     • HYSTERECTOMY     • TONSILLECTOMY     • TOTAL KNEE ARTHROPLASTY Left    • TOTAL KNEE ARTHROPLASTY Right      General Information     Row Name 21 1616          Physical Therapy Time and Intention    Document Type  therapy note (daily note)  -     Mode of Treatment  individual therapy;physical therapy  -[facility] Name 21 1616          General Information    Existing Precautions/Restrictions  fall;oxygen therapy device and L/min  -[facility] Name 21 1616          Cognition    Orientation Status (Cognition)  oriented to;person  -[facility] Name 21 1616          Safety Issues, Functional Mobility    Impairments Affecting Function (Mobility)  balance;coordination;endurance/activity tolerance;postural/trunk control;range of motion (ROM);strength  -       User Key  (r) = Recorded By, (t) = Taken By, (c) = Cosigned By     Initials Name Provider Type     Cherelle Ng, PT Physical Therapist        Mobility     Row Name 04/16/21 1616          Bed Mobility    Bed Mobility  supine-sit;sit-supine  -     Supine-Sit Stephens (Bed Mobility)  verbal cues;nonverbal cues (demo/gesture);moderate assist (50% patient effort)  -     Sit-Supine Stephens (Bed Mobility)  nonverbal cues (demo/gesture);moderate assist (50% patient effort)  -     Comment (Bed Mobility)  pt reports 9/10 pain and pt with lethargy and fatigue, pt returned to supine after sitting for 3 minutes  -[facility] Name 04/16/21 1616          Transfers    Comment (Transfers)  not attempted this date  -       User Key  (r) = Recorded By, (t) = Taken By, (c) = Cosigned By    Initials Name Provider Type     Cherelle Ng, PT Physical Therapist        Obj/Interventions     [facility] Name 04/16/21 1617          Motor Skills    Therapeutic Exercise  -- 10 reps L TKA protocol with assist  -       User Key  (r) = Recorded By, (t) = Taken By, (c) = Cosigned By    Initials Name Provider Type     Cherelle Ng, PT Physical Therapist        Goals/Plan    No documentation.       Clinical Impression     [facility] Name 04/16/21 1618          Pain    Additional Documentation  Pain Scale: Numbers Pre/Post-Treatment (Group)  -[facility]     Row Name 04/16/21 1618          Pain Scale: Numbers Pre/Post-Treatment    Pretreatment Pain Rating  9/10  -     Posttreatment Pain Rating  9/10  -     Pain Location - Side  Left  -     Pain Location  knee  -     Pain Intervention(s)  Repositioned;Cold applied RN gave meds prior to PT session  -[facility] Name 04/16/21 1618          Plan of Care Review    Plan of Care Reviewed With  patient  -     Outcome Summary  Pt with confusion and lethargy today after having increased CO2 on ABG and requiring BiPAP. Pt was able to participate in 10 reps of L TKA protocol exercises with heavy assist and pt requiring increased time, cuing and  assistance to sit on EOB. PT will continue to follow to address strength, mobility and gait.  -     Row Name 04/16/21 1618          Positioning and Restraints    Pre-Treatment Position  in bed  -     Post Treatment Position  bed  -CH     In Bed  notified nsg;supine;call light within reach;encouraged to call for assist;exit alarm on  -       User Key  (r) = Recorded By, (t) = Taken By, (c) = Cosigned By    Initials Name Provider Type    Cherelle Brown PT Physical Therapist        Outcome Measures     Row Name 04/16/21 1621          How much help from another person do you currently need...    Turning from your back to your side while in flat bed without using bedrails?  2  -CH     Moving from lying on back to sitting on the side of a flat bed without bedrails?  2  -CH     Moving to and from a bed to a chair (including a wheelchair)?  1  -CH     Standing up from a chair using your arms (e.g., wheelchair, bedside chair)?  1  -CH     Climbing 3-5 steps with a railing?  1  -CH     To walk in hospital room?  1  -     AM-PAC 6 Clicks Score (PT)  8  -     Row Name 04/16/21 1621          Functional Assessment    Outcome Measure Options  AM-PAC 6 Clicks Basic Mobility (PT)  -       User Key  (r) = Recorded By, (t) = Taken By, (c) = Cosigned By    Initials Name Provider Type    Cherelle Brown PT Physical Therapist        Physical Therapy Education                 Title: PT OT SLP Therapies (Done)     Topic: Physical Therapy (Done)     Point: Mobility training (Done)     Learning Progress Summary           Patient Acceptance, E,TB,D, NR,VU by  at 4/16/2021 1621    Acceptance, E,TB,D, NR by JEFFREY at 4/15/2021 1454    Acceptance, TB, DU,NR by NIKA at 4/14/2021 1212                   Point: Home exercise program (Done)     Learning Progress Summary           Patient Acceptance, E,TB,D, NR,VU by  at 4/16/2021 1621    Acceptance, E,TB,D, NR by JEFFREY at 4/15/2021 1454    Acceptance, TB, DU,NR by NIKA at  4/14/2021 1212                   Point: Body mechanics (Done)     Learning Progress Summary           Patient Acceptance, E,TB,D, NR,VU by  at 4/16/2021 1621    Acceptance, E,TB,D, NR by  at 4/15/2021 1454    Acceptance, TB, DU,NR by NIKA at 4/14/2021 1212                   Point: Precautions (Done)     Learning Progress Summary           Patient Acceptance, E,TB,D, NR,VU by  at 4/16/2021 1621    Acceptance, E,TB,D, NR by  at 4/15/2021 1454    Acceptance, TB, DU,NR by NIKA at 4/14/2021 1212                               User Key     Initials Effective Dates Name Provider Type Discipline     04/03/18 -  Cherelle Ng PT Physical Therapist PT    JEFFREY 03/07/18 -  Whitney Morgan PTA Physical Therapy Assistant PT    NIKA 10/25/19 -  Marilee Jiménez PT Physical Therapist PT              PT Recommendation and Plan     Plan of Care Reviewed With: patient  Outcome Summary: Pt with confusion and lethargy today after having increased CO2 on ABG and requiring BiPAP. Pt was able to participate in 10 reps of L TKA protocol exercises with heavy assist and pt requiring increased time, cuing and assistance to sit on EOB. PT will continue to follow to address strength, mobility and gait.     Time Calculation:   PT Charges     Row Name 04/16/21 1622             Time Calculation    Start Time  1555  -      Stop Time  1610  -      Time Calculation (min)  15 min  -      PT Received On  04/16/21  -      PT - Next Appointment  04/17/21  -         Time Calculation- PT    Total Timed Code Minutes- PT  15 minute(s)  -        User Key  (r) = Recorded By, (t) = Taken By, (c) = Cosigned By    Initials Name Provider Type     Cherelle Ng PT Physical Therapist        Therapy Charges for Today     Code Description Service Date Service Provider Modifiers Qty    52576476001 HC PT THER PROC EA 15 MIN 4/16/2021 Cherelle Ng, PT GP 1    80603652571 HC PT THER SUPP EA 15 MIN 4/16/2021 Cherelle Ng, PT GP 1           PT G-Codes  Outcome Measure Options: AM-PAC 6 Clicks Basic Mobility (PT)  AM-PAC 6 Clicks Score (PT): 8    Cherelle Ng, PT  4/16/2021

## 2021-04-16 NOTE — PROGRESS NOTES
Orthopaedic Surgery  Progress Note  4/16/2021    Patient Name:  Jenn Dupont  YOB: 1945  Age: 75 y.o.  Medical Records Number:  [id number]  Date of Admission: 4/13/2021    No complaints    Vitals:  Vitals:    04/16/21 0222 04/16/21 0324 04/16/21 0500 04/16/21 0741   BP: 114/63  136/67 151/80   BP Location: Left arm  Left arm Left arm   Patient Position: Lying  Lying Lying   Pulse: 82 82 82 87   Resp: 18 16 16 18   Temp: 97.8 °F (36.6 °C)  97.8 °F (36.6 °C) 98.4 °F (36.9 °C)   TempSrc: Skin  Oral Oral   SpO2: 91% 94% 94% 91%   Weight:       Height:           LLE:  NVI, calf nontender, sensation intact  No signs of DVT    Incision: clean, no signs of infection    Lab Results (last 24 hours)     Procedure Component Value Units Date/Time    CBC & Differential [[id number]]  (Abnormal) Collected: 04/16/21 0656    Specimen: Blood Updated: 04/16/21 0748    Narrative:      The following orders were created for panel order CBC & Differential.  Procedure                               Abnormality         Status                     ---------                               -----------         ------                     CBC Auto Differential[[id number]]        Abnormal            Final result                 Please view results for these tests on the individual orders.    CBC Auto Differential [[id number]]  (Abnormal) Collected: 04/16/21 0656    Specimen: Blood Updated: 04/16/21 0748     WBC 7.67 10*3/mm3      RBC 4.78 10*6/mm3      Hemoglobin 13.1 g/dL      Hematocrit 43.0 %      MCV 90.0 fL      MCH 27.4 pg      MCHC 30.5 g/dL      RDW 15.1 %      RDW-SD 50.0 fl      MPV 10.6 fL      Platelets 171 10*3/mm3      Neutrophil % 78.2 %      Lymphocyte % 12.3 %      Monocyte % 7.7 %      Eosinophil % 1.2 %      Basophil % 0.3 %      Immature Grans % 0.3 %      Neutrophils, Absolute 6.01 10*3/mm3      Lymphocytes, Absolute 0.94 10*3/mm3      Monocytes, Absolute 0.59 10*3/mm3      Eosinophils, Absolute 0.09 10*3/mm3       Basophils, Absolute 0.02 10*3/mm3      Immature Grans, Absolute 0.02 10*3/mm3      nRBC 0.0 /100 WBC     Basic Metabolic Panel [[id number]] Collected: 04/16/21 0656    Specimen: Blood Updated: 04/16/21 0742    POC Glucose Once [[id number]]  (Abnormal) Collected: 04/16/21 0628    Specimen: Blood Updated: 04/16/21 0630     Glucose 135 mg/dL     Blood Gas, Arterial - [[id number]]  (Abnormal) Collected: 04/16/21 0311    Specimen: Arterial Blood Updated: 04/16/21 0315     Site Arterial: left brachial     Moy's Test N/A     pH, Arterial 7.335 pH units      pCO2, Arterial 68.4 mm Hg      Comment: Critical:Notify RAMIREZ ARROYO (16-Apr-21 03:14:34)Read back ok        pO2, Arterial 53.1 mm Hg      Comment: Critical:Notify RAMIREZ ARROYO (16-Apr-21 03:14:34)Read back ok        HCO3, Arterial 36.5 mmol/L      Base Excess, Arterial 7.7 mmol/L      O2 Saturation Calculated 83.2 %      Barometric Pressure for Blood Gas 749.2 mmHg      Modality Cannula     Flow Rate 3 lpm      Rate 16 Breaths/minute     POC Glucose Once [[id number]]  (Abnormal) Collected: 04/16/21 0253    Specimen: Blood Updated: 04/16/21 0256     Glucose 150 mg/dL     POC Glucose Once [[id number]]  (Abnormal) Collected: 04/15/21 2137    Specimen: Blood Updated: 04/15/21 2138     Glucose 163 mg/dL     POC Glucose Once [[id number]]  (Normal) Collected: 04/15/21 1536    Specimen: Blood Updated: 04/15/21 1538     Glucose 129 mg/dL           Assesment/Plan:    Procedures:  Left TKA Revision  Postoperative Day: 3  Weightbearing Status:  WBAT with walker  DVT Prophylaxis:  ASA for DVT prophylaxis    Disposition:  Events noted, CT negative.  Rehab when medically stable.    Rahat Spence  [facility]  [address] [place] [address]  [phone]    4/16/2021

## 2021-04-16 NOTE — PROGRESS NOTES
"DAILY PROGRESS NOTE  [facility]    Patient Identification:  Name: Jenn Dupont  Age: 75 y.o.  Sex: female  :  1945  MRN: [id number]         Primary Care Physician: Nima Diop MD    Subjective:  Interval History:She complains of pain.  She is on BiPAP.    Objective:    Scheduled Meds:allopurinol, 100 mg, Oral, Daily  aspirin, 325 mg, Oral, BID With Meals  atorvastatin, 20 mg, Oral, Daily  ferrous sulfate, 325 mg, Oral, Daily With Breakfast  insulin lispro, 0-7 Units, Subcutaneous, TID AC  ipratropium-albuterol, 3 mL, Nebulization, 4x Daily - RT  metoprolol tartrate, 25 mg, Oral, Nightly  potassium chloride, 10 mEq, Oral, Daily  sodium chloride, 3 mL, Intravenous, Q12H  sodium chloride, 4 mL, Nebulization, BID - RT  triamterene-hydrochlorothiazide, 1 tablet, Oral, Daily  vilazodone, 40 mg, Oral, Daily      Continuous Infusions:     Vital signs in last 24 hours:  Temp:  [97.3 °F (36.3 °C)-98.9 °F (37.2 °C)] 97.4 °F (36.3 °C)  Heart Rate:  [80-99] 97  Resp:  [16-22] 18  BP: (113-151)/(54-80) 142/77    Intake/Output:    Intake/Output Summary (Last 24 hours) at 2021 1400  Last data filed at 2021 0625  Gross per 24 hour   Intake 120 ml   Output 1200 ml   Net -1080 ml       Exam:  /77 (BP Location: Left arm, Patient Position: Lying)   Pulse 97   Temp 97.4 °F (36.3 °C) (Oral)   Resp 18   Ht 152.4 cm (60\")   Wt 110 kg (243 lb 6.2 oz)   SpO2 90%   BMI 47.53 kg/m²     General Appearance:    Alert, cooperative, no distress   Head:    Normocephalic, without obvious abnormality, atraumatic   Eyes:       Throat:   Lips, tongue, gums normal   Neck:   Supple, symmetrical, trachea midline, no JVD   Lungs:     Clear to auscultation bilaterally, respirations unlabored   Chest Wall:    No tenderness or deformity    Heart:    Regular rate and rhythm, S1 and S2 normal, no murmur,no  Rub or gallop   Abdomen:     Soft, nontender, bowel sounds active, no masses, no organomegaly  "   Extremities:   Extremities normal, left knee surgical changes, no cyanosis or edema   Pulses:      Skin:   Skin is warm and dry,  no rashes or palpable lesions   Neurologic:   no focal deficits noted      Lab Results (last 72 hours)     Procedure Component Value Units Date/Time    POC Glucose Once [[id number]]  (Abnormal) Collected: 04/14/21 1032    Specimen: Blood Updated: 04/14/21 1037     Glucose 292 mg/dL     Basic Metabolic Panel [[id number]]  (Abnormal) Collected: 04/14/21 0640    Specimen: Blood Updated: 04/14/21 0745     Glucose 255 mg/dL      BUN 19 mg/dL      Creatinine 0.71 mg/dL      Sodium 141 mmol/L      Potassium 4.3 mmol/L      Chloride 101 mmol/L      CO2 31.5 mmol/L      Calcium 9.0 mg/dL      eGFR  African Amer 98 mL/min/1.73      BUN/Creatinine Ratio 26.8     Anion Gap 8.5 mmol/L     Narrative:      GFR Normal >60  Chronic Kidney Disease <60  Kidney Failure <15      CBC (No Diff) [[id number]]  (Abnormal) Collected: 04/14/21 0640    Specimen: Blood Updated: 04/14/21 0711     WBC 10.48 10*3/mm3      RBC 5.05 10*6/mm3      Hemoglobin 14.0 g/dL      Hematocrit 45.4 %      MCV 89.9 fL      MCH 27.7 pg      MCHC 30.8 g/dL      RDW 15.2 %      RDW-SD 50.2 fl      MPV 10.5 fL      Platelets 214 10*3/mm3     POC Glucose Once [[id number]]  (Abnormal) Collected: 04/14/21 0649    Specimen: Blood Updated: 04/14/21 0652     Glucose 261 mg/dL     Blood Gas, Arterial - [[id number]]  (Abnormal) Collected: 04/13/21 2332    Specimen: Arterial Blood Updated: 04/13/21 2337     Site Arterial: right radial     Moy's Test Positive     pH, Arterial 7.327 pH units      pCO2, Arterial 57.8 mm Hg      Comment: Critical:Notify RAMIREZ GONZALEZ (13-Apr-21 23:35:44)Read back ok        pO2, Arterial 87.2 mm Hg      HCO3, Arterial 30.2 mmol/L      Base Excess, Arterial 2.6 mmol/L      O2 Saturation Calculated 95.6 %      Barometric Pressure for Blood Gas 751.8 mmHg      Modality HFNC     Flow Rate 6 lpm      Rate 18  Breaths/minute     BNP [[id number]]  (Normal) Collected: 04/13/21 2240    Specimen: Blood Updated: 04/13/21 2330     proBNP 125.2 pg/mL     Narrative:      Among patients with dyspnea, NT-proBNP is highly sensitive for the detection of acute congestive heart failure. In addition NT-proBNP of <300 pg/ml effectively rules out acute congestive heart failure with 99% negative predictive value.    Results may be falsely decreased if patient taking Biotin.      Troponin [[id number]]  (Normal) Collected: 04/13/21 2240    Specimen: Blood Updated: 04/13/21 2330     Troponin T <0.010 ng/mL     Narrative:      Troponin T Reference Range:  <= 0.03 ng/mL-   Negative for AMI  >0.03 ng/mL-     Abnormal for myocardial necrosis.  Clinicians would have to utilize clinical acumen, EKG, Troponin and serial changes to determine if it is an Acute Myocardial Infarction or myocardial injury due to an underlying chronic condition.       Results may be falsely decreased if patient taking Biotin.      Comprehensive Metabolic Panel [[id number]]  (Abnormal) Collected: 04/13/21 2240    Specimen: Blood Updated: 04/13/21 2325     Glucose 212 mg/dL      BUN 21 mg/dL      Creatinine 0.68 mg/dL      Sodium 138 mmol/L      Potassium 3.5 mmol/L      Chloride 98 mmol/L      CO2 30.8 mmol/L      Calcium 8.8 mg/dL      Total Protein 6.7 g/dL      Albumin 3.50 g/dL      ALT (SGPT) 60 U/L      AST (SGOT) 77 U/L      Alkaline Phosphatase 122 U/L      Total Bilirubin 0.4 mg/dL      eGFR  African Amer 103 mL/min/1.73      Globulin 3.2 gm/dL      A/G Ratio 1.1 g/dL      BUN/Creatinine Ratio 30.9     Anion Gap 9.2 mmol/L     Narrative:      GFR Normal >60  Chronic Kidney Disease <60  Kidney Failure <15      CBC & Differential [[id number]]  (Abnormal) Collected: 04/13/21 2240    Specimen: Blood Updated: 04/13/21 2320    Narrative:      The following orders were created for panel order CBC & Differential.  Procedure                               Abnormality          Status                     ---------                               -----------         ------                     CBC Auto Differential[[id number]]        Abnormal            Final result                 Please view results for these tests on the individual orders.    CBC Auto Differential [[id number]]  (Abnormal) Collected: 04/13/21 2240    Specimen: Blood Updated: 04/13/21 2320     WBC 8.26 10*3/mm3      RBC 5.09 10*6/mm3      Hemoglobin 13.6 g/dL      Hematocrit 43.1 %      MCV 84.7 fL      MCH 26.7 pg      MCHC 31.6 g/dL      RDW 14.9 %      RDW-SD 45.3 fl      MPV 10.6 fL      Platelets 231 10*3/mm3      Neutrophil % 93.3 %      Lymphocyte % 4.1 %      Monocyte % 2.1 %      Eosinophil % 0.0 %      Basophil % 0.1 %      Immature Grans % 0.4 %      Neutrophils, Absolute 7.71 10*3/mm3      Lymphocytes, Absolute 0.34 10*3/mm3      Monocytes, Absolute 0.17 10*3/mm3      Eosinophils, Absolute 0.00 10*3/mm3      Basophils, Absolute 0.01 10*3/mm3      Immature Grans, Absolute 0.03 10*3/mm3      nRBC 0.0 /100 WBC     POC Glucose Once [[id number]]  (Abnormal) Collected: 04/13/21 2114    Specimen: Blood Updated: 04/13/21 2135     Glucose 207 mg/dL     POC Glucose Once [[id number]]  (Abnormal) Collected: 04/13/21 1643    Specimen: Blood Updated: 04/13/21 1649     Glucose 205 mg/dL     COVID PRE-OP / PRE-PROCEDURE SCREENING ORDER (NO ISOLATION) - Swab, Nasopharynx [[id number]]  (Normal) Collected: 04/13/21 0939    Specimen: Swab from Nasopharynx Updated: 04/13/21 1038    Narrative:      The following orders were created for panel order COVID PRE-OP / PRE-PROCEDURE SCREENING ORDER (NO ISOLATION) - Swab, Nasopharynx.  Procedure                               Abnormality         Status                     ---------                               -----------         ------                     COVID-19,[facility]-HOUSE...[[id number]]  Normal              Final result                 Please view results for these tests on the individual  orders.    COVID-19, [facility] IN-HOUSE CEPHEID, NP SWAB IN TRANSPORT MEDIA 8-12 HR TAT - Swab, Nasopharynx [[id number]]  (Normal) Collected: 04/13/21 0939    Specimen: Swab from Nasopharynx Updated: 04/13/21 1038     COVID19 Not Detected    Narrative:      Fact sheet for providers: https://www.fda.gov/media/261481/download     Fact sheet for patients: https://www.fda.gov/media/520425/download    POC Glucose Once [[id number]]  (Abnormal) Collected: 04/13/21 1024    Specimen: Blood Updated: 04/13/21 1027     Glucose 132 mg/dL     SCANNED - LABS [[id number]] Resulted: 04/13/21     Updated: 04/12/21 1545        Data Review:  Results from last 7 days   Lab Units 04/16/21  0656 04/15/21  0454 04/14/21  0640   SODIUM mmol/L 139 141 141   POTASSIUM mmol/L 3.9 4.0 4.3   CHLORIDE mmol/L 99 101 101   CO2 mmol/L 33.8* 31.1* 31.5*   BUN mg/dL 19 26* 19   CREATININE mg/dL 0.47* 0.69 0.71   GLUCOSE mg/dL 133* 171* 255*   CALCIUM mg/dL 9.1 8.1* 9.0     Results from last 7 days   Lab Units 04/16/21  0656 04/15/21  0454 04/14/21  0640   WBC 10*3/mm3 7.67 7.60 10.48   HEMOGLOBIN g/dL 13.1 12.6 14.0   HEMATOCRIT % 43.0 40.4 45.4   PLATELETS 10*3/mm3 171 203 214             Lab Results   Lab Value Date/Time    TROPONINT <0.010 04/13/2021 2240         Results from last 7 days   Lab Units 04/13/21  2240   ALK PHOS U/L 122*   BILIRUBIN mg/dL 0.4   ALT (SGPT) U/L 60*   AST (SGOT) U/L 77*             Glucose   Date/Time Value Ref Range Status   04/16/2021 1106 123 70 - 130 mg/dL Final   04/16/2021 0628 135 (H) 70 - 130 mg/dL Final   04/16/2021 0253 150 (H) 70 - 130 mg/dL Final   04/15/2021 2137 163 (H) 70 - 130 mg/dL Final   04/15/2021 1536 129 70 - 130 mg/dL Final   04/15/2021 1048 209 (H) 70 - 130 mg/dL Final   04/15/2021 0616 153 (H) 70 - 130 mg/dL Final   04/14/2021 2318 169 (H) 70 - 130 mg/dL Final           Past Medical History:   Diagnosis Date   • Asthma     ALLERGY INDUCED    • Coronary artery disease    • Diabetes mellitus (CMS/[facility])    •  Elevated cholesterol    • Hypertension    • Knee pain, left    • Low back pain    • MI (myocardial infarction) (CMS/[facility]) 2013   • Pulmonary embolism (CMS/HCC)     2009   • Shoulder pain, left    • Urinary frequency        Assessment:  Active Hospital Problems    Diagnosis  POA   • **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       Plan:  Continue current RX. Will follow.Holding some BP meds. Sliding scale insulin.  Will need SNU for rehab.  When stable.    Booker Acosta MD  4/16/2021  14:00 EDT

## 2021-04-16 NOTE — NURSING NOTE
Dr. Camargo at bedside.  Notified MD of patient fall prior to transfer to .  SCD removed from RLE at 0800 due to pending RLE doppler- Dr. Camargo made aware.  Doppler study placed on hold due to increased patient confusion and refusal of BIPAP/pulled out an IV- Dr. Camargo made aware.  Patient suspicious of staff and does not believe that she needs the BIPAP.  Dr. Camargo convinced the patient to wear BIPAP.  Currently on BIPAP and tolerating well.  Will continue to monitor.

## 2021-04-16 NOTE — NURSING NOTE
Discussed case with Dr. Louis.  Repeat ABG's obtained and reported to Dr. Louis.  NNO.  Doppler RLE- unable to transport safely to vascular.  Called Vascular lab and they are changing her exam to a portable exam due to patient condition.  Dr. Louis made aware of delay.

## 2021-04-16 NOTE — PLAN OF CARE
Goal Outcome Evaluation:         See nursing notes throughout shift.  Was with Dr. Louis at bedside and discussed the patient's mental status during MD visit.  Adjustments made to BiPAP to facilitate decreasing CO2.  At one point, NIH performed due to generalized confusion and generalized weakness noted during lunch (patient refused portion of exam-see flowsheets, Dr. Louis made aware of this).  Findings from this exam were generalized weakness with no focal deficits and what was scored was 0 (that would be observed on exam).  Dr. Louis aware.  Discussed pain management with Dr. Louis.  Pain meds to be given with caution as long as she is using her BIPAP.  Patient has been confused and cautious/suspicious of staff intermittently since the start of the shift.  Has been drowsy since start of shift.  She will perk up after a couple of hours on the BIPAP.  Patient started hallucinating after dinner.  BIPAP replaced.  Refuses care at times. Dr. Zheng notified of hallucinations and slightly increased confusion after dinner with break from BIPAP.  No focal deficits noted.  Told as long as she is pulling her volumes on the BIPAP, that CO2 should improve.    Instructed to call for additional ABG's if mental status worsens.

## 2021-04-17 LAB
GLUCOSE BLDC GLUCOMTR-MCNC: 149 MG/DL (ref 70–130)
GLUCOSE BLDC GLUCOMTR-MCNC: 150 MG/DL (ref 70–130)
GLUCOSE BLDC GLUCOMTR-MCNC: 160 MG/DL (ref 70–130)
GLUCOSE BLDC GLUCOMTR-MCNC: 202 MG/DL (ref 70–130)

## 2021-04-17 PROCEDURE — 97530 THERAPEUTIC ACTIVITIES: CPT

## 2021-04-17 PROCEDURE — 82962 GLUCOSE BLOOD TEST: CPT

## 2021-04-17 PROCEDURE — 94799 UNLISTED PULMONARY SVC/PX: CPT

## 2021-04-17 PROCEDURE — 63710000001 INSULIN LISPRO (HUMAN) PER 5 UNITS: Performed by: HOSPITALIST

## 2021-04-17 PROCEDURE — 94660 CPAP INITIATION&MGMT: CPT

## 2021-04-17 RX ORDER — VILAZODONE HYDROCHLORIDE 10 MG/1
10 TABLET ORAL DAILY
Status: DISCONTINUED | OUTPATIENT
Start: 2021-04-18 | End: 2021-04-19 | Stop reason: HOSPADM

## 2021-04-17 RX ADMIN — DIAZEPAM 5 MG: 5 TABLET ORAL at 09:25

## 2021-04-17 RX ADMIN — METOPROLOL TARTRATE 25 MG: 25 TABLET, FILM COATED ORAL at 20:14

## 2021-04-17 RX ADMIN — INSULIN LISPRO 2 UNITS: 100 INJECTION, SOLUTION INTRAVENOUS; SUBCUTANEOUS at 07:48

## 2021-04-17 RX ADMIN — IPRATROPIUM BROMIDE AND ALBUTEROL SULFATE 3 ML: 2.5; .5 SOLUTION RESPIRATORY (INHALATION) at 16:04

## 2021-04-17 RX ADMIN — ACETAMINOPHEN 325 MG: 325 TABLET ORAL at 14:31

## 2021-04-17 RX ADMIN — IPRATROPIUM BROMIDE AND ALBUTEROL SULFATE 3 ML: 2.5; .5 SOLUTION RESPIRATORY (INHALATION) at 19:56

## 2021-04-17 RX ADMIN — IPRATROPIUM BROMIDE AND ALBUTEROL SULFATE 3 ML: 2.5; .5 SOLUTION RESPIRATORY (INHALATION) at 12:11

## 2021-04-17 RX ADMIN — SODIUM CHLORIDE, PRESERVATIVE FREE 3 ML: 5 INJECTION INTRAVENOUS at 00:04

## 2021-04-17 RX ADMIN — Medication 4 ML: at 07:42

## 2021-04-17 RX ADMIN — VILAZODONE HYDROCHLORIDE 40 MG: 40 TABLET ORAL at 09:05

## 2021-04-17 RX ADMIN — SODIUM CHLORIDE, PRESERVATIVE FREE 3 ML: 5 INJECTION INTRAVENOUS at 09:06

## 2021-04-17 RX ADMIN — POTASSIUM CHLORIDE 10 MEQ: 750 TABLET, EXTENDED RELEASE ORAL at 09:05

## 2021-04-17 RX ADMIN — ATORVASTATIN CALCIUM 20 MG: 20 TABLET, FILM COATED ORAL at 09:06

## 2021-04-17 RX ADMIN — IPRATROPIUM BROMIDE AND ALBUTEROL SULFATE 3 ML: 2.5; .5 SOLUTION RESPIRATORY (INHALATION) at 07:41

## 2021-04-17 RX ADMIN — ASPIRIN 325 MG: 325 TABLET, COATED ORAL at 09:05

## 2021-04-17 RX ADMIN — TRIAMTERENE AND HYDROCHLOROTHIAZIDE 1 TABLET: 75; 50 TABLET ORAL at 09:05

## 2021-04-17 RX ADMIN — Medication 4 ML: at 19:56

## 2021-04-17 RX ADMIN — FERROUS SULFATE TAB 325 MG (65 MG ELEMENTAL FE) 325 MG: 325 (65 FE) TAB at 09:05

## 2021-04-17 RX ADMIN — ASPIRIN 325 MG: 325 TABLET, COATED ORAL at 17:23

## 2021-04-17 RX ADMIN — INSULIN LISPRO 2 UNITS: 100 INJECTION, SOLUTION INTRAVENOUS; SUBCUTANEOUS at 11:47

## 2021-04-17 RX ADMIN — ALLOPURINOL 100 MG: 100 TABLET ORAL at 09:05

## 2021-04-17 NOTE — PLAN OF CARE
Goal Outcome Evaluation:  Plan of Care Reviewed With: patient  Progress: no change  Outcome Summary: Pt confused and anxious at beg of PT session. Pt voicing distrust of staff. Pt req a lot of reassurance and support for short transfer to chair w/ mod/max A x2 and use of fww. Pt resting forearms on fww and not following cues for upright posture and B hands on . Pt unsteady and req reassurance during transfer. Pt limited by diminished cognition. Pt not participating in L knee protocol w/ PROM given.  PT will prog as pt nino.    Patient was not wearing a face mask during this therapy encounter. Therapist used appropriate personal protective equipment including eye protection, mask, and gloves.  Mask used was standard procedure mask. Appropriate PPE was worn during the entire therapy session. Hand hygiene was completed before and after therapy session. Patient is not in enhanced droplet precautions.

## 2021-04-17 NOTE — PLAN OF CARE
Goal Outcome Evaluation:     Progress: improving  Outcome Summary: Pt was somewhat restless through the night. On and off confusion and distrust of care. Pt is  no longer lethargic, and on and off complaints of pain, pt refusing medication for pain. Pt wore Bipap majority of night. Will continue care.

## 2021-04-17 NOTE — THERAPY TREATMENT NOTE
Patient Name: Jenn Dupont  : 1945    MRN: [id number]                              Today's Date: 2021       Admit Date: 2021    Visit Dx:     ICD-10-CM ICD-9-CM   1. Mechanical loosening of prosthetic knee, initial encounter (CMS/[facility])  T84.038A 996.41    Z96.659 V43.65     Patient Active Problem List   Diagnosis   • Aseptic loosening of prosthetic knee (CMS/[facility])   • DJD (degenerative joint disease)   • Coronary artery disease   • Diabetes mellitus (CMS/[facility])   • Asthma   • Hypertension   • Acute on chronic respiratory failure with hypoxia and hypercapnia (CMS/[facility])     Past Medical History:   Diagnosis Date   • Asthma     ALLERGY INDUCED    • Coronary artery disease    • Diabetes mellitus (CMS/[facility])    • Elevated cholesterol    • Hypertension    • Knee pain, left    • Low back pain    • MI (myocardial infarction) (CMS/[facility])    • Pulmonary embolism (CMS/[facility])        • Shoulder pain, left    • Urinary frequency      Past Surgical History:   Procedure Laterality Date   • CHOLECYSTECTOMY     • CORONARY ANGIOPLASTY WITH STENT PLACEMENT     • HYSTERECTOMY     • TONSILLECTOMY     • TOTAL KNEE ARTHROPLASTY Left    • TOTAL KNEE ARTHROPLASTY Right      General Information     Row Name 21 1159          Physical Therapy Time and Intention    Document Type  therapy note (daily note)  -     Mode of Treatment  physical therapy  -     Row Name 21 1159          Cognition    Orientation Status (Cognition)  oriented to;person  -     Row Name 21 1159          Safety Issues, Functional Mobility    Safety Issues Affecting Function (Mobility)  ability to follow commands;insight into deficits/self-awareness;judgment;positioning of assistive device;safety precaution awareness;sequencing abilities;steps too close to assistive device;at risk behavior observed;problem-solving  -     Impairments Affecting Function (Mobility)  balance;coordination;endurance/activity  tolerance;pain;range of motion (ROM);strength;cognition  -PH       User Key  (r) = Recorded By, (t) = Taken By, (c) = Cosigned By    Initials Name Provider Type    PH Judy Echavarria PTA Physical Therapy Assistant        Mobility     Row Name 04/17/21 1200          Bed Mobility    Bed Mobility  supine-sit  -PH     Sit-Supine [place] (Bed Mobility)  nonverbal cues (demo/gesture);moderate assist (50% patient effort);2 person assist  -PH     Assistive Device (Bed Mobility)  bed rails;head of bed elevated  -PH     Comment (Bed Mobility)  pt req a lot of encouragement to sit up to EOB; pt extremely confused and very paranoid; pt rested for a few min at EOB being given reassurance. Pt req extra support and reassurance throughout session 2/2 to anxiety.  -PH     Row Name 04/17/21 1200          Bed-Chair Transfer    Bed-Chair [place] (Transfers)  moderate assist (50% patient effort);2 person assist;verbal cues;nonverbal cues (demo/gesture)  -PH     Assistive Device (Bed-Chair Transfers)  walker, front-wheeled  -PH     Row Name 04/17/21 1200          Sit-Stand Transfer    Sit-Stand [place] (Transfers)  moderate assist (50% patient effort);2 person assist;verbal cues;nonverbal cues (demo/gesture)  -PH     Assistive Device (Sit-Stand Transfers)  walker, front-wheeled  -PH     Row Name 04/17/21 1200          Gait/Stairs (Locomotion)    [place] Level (Gait)  moderate assist (50% patient effort);maximum assist (25% patient effort);2 person assist;verbal cues;nonverbal cues (demo/gesture)  -PH     Distance in Feet (Gait)  2' to chair, shuffling w/ B knees and hips forward flexed; pt leaning over fww on forearms and would not correct w/ vc/tc  -PH     Deviations/Abnormal Patterns (Gait)  patti decreased;festinating/shuffling;gait speed decreased;stride length decreased;base of support, wide  -PH     Bilateral Gait Deviations  forward flexed posture;heel strike decreased  -PH     Comment (Gait/Stairs)   pt anxious upon transfer to chair and given reassurance; Pt unsteady and not responding to vc/tc for upright posture and B hands on .  -PH       User Key  (r) = Recorded By, (t) = Taken By, (c) = Cosigned By    Initials Name Provider Type    Judy Mccloud PTA Physical Therapy Assistant        Obj/Interventions     Row Name 04/17/21 1233          Motor Skills    Therapeutic Exercise  other (see comments) PROM for L knee protocol x 5 reps; pt not participating w/ demo and tc/vc given.  -PH     Row Name 04/17/21 1233          Balance    Balance Assessment  sitting static balance  -PH     Static Sitting Balance  supported;sitting, edge of bed;WFL  -PH     Static Standing Balance  mild impairment;supported;standing  -PH       User Key  (r) = Recorded By, (t) = Taken By, (c) = Cosigned By    Initials Name Provider Type    Judy Mccloud PTA Physical Therapy Assistant        Goals/Plan    No documentation.       Clinical Impression     Row Name 04/17/21 1234          Pain    Additional Documentation  Pain Scale: FACES Pre/Post-Treatment (Group)  -PH     Row Name 04/17/21 1234          Pain Scale: FACES Pre/Post-Treatment    Pain: FACES Scale, Pretreatment  2-->hurts little bit  -PH     Posttreatment Pain Rating  4-->hurts little more  -PH     Pain Location - Side  Left  -PH     Pain Location - Orientation  incisional  -PH     Pain Location  knee  -PH     Row Name 04/17/21 1234          Plan of Care Review    Plan of Care Reviewed With  patient  -PH     Progress  no change  -PH     Outcome Summary  Pt confused and anxious at beg of PT session. Pt voicing distrust of staff. Pt req a lot of reassurance and support for short transfer to chair w/ mod/max A x2 and use of fww. Pt resting forearms on fww and not following cues for upright posture and B hands on . Pt unsteady and req reassurance during transfer. Pt limited by diminished cognition. Pt not participating in L knee protocol w/ PROM  given.  PT will prog as pt nion.  -PH     Row Name 04/17/21 1234          Positioning and Restraints    Pre-Treatment Position  in bed  -PH     Post Treatment Position  chair  -PH     In Chair  reclined;call light within reach;encouraged to call for assist;exit alarm on;notified nsg  -PH       User Key  (r) = Recorded By, (t) = Taken By, (c) = Cosigned By    Initials Name Provider Type     Judy Echavarria PTA Physical Therapy Assistant        Outcome Measures     Row Name 04/17/21 1238          How much help from another person do you currently need...    Turning from your back to your side while in flat bed without using bedrails?  2  -PH     Moving from lying on back to sitting on the side of a flat bed without bedrails?  2  -PH     Moving to and from a bed to a chair (including a wheelchair)?  2  -PH     Standing up from a chair using your arms (e.g., wheelchair, bedside chair)?  2  -PH     Climbing 3-5 steps with a railing?  1  -PH     To walk in hospital room?  1  -PH     AM-PAC 6 Clicks Score (PT)  10  -PH     Row Name 04/17/21 1238          Functional Assessment    Outcome Measure Options  AM-PAC 6 Clicks Basic Mobility (PT)  -PH       User Key  (r) = Recorded By, (t) = Taken By, (c) = Cosigned By    Initials Name Provider Type     Judy Echavarria PTA Physical Therapy Assistant        Physical Therapy Education                 Title: PT OT SLP Therapies (In Progress)     Topic: Physical Therapy (In Progress)     Point: Mobility training (In Progress)     Learning Progress Summary           Patient Acceptance, E,D, NR by  at 4/17/2021 1238    Acceptance, E,TB,D, NR,VU by  at 4/16/2021 1621    Acceptance, E,TB,D, NR by JEFFREY at 4/15/2021 1454    Acceptance, TB, DU,NR by NIKA at 4/14/2021 1212                   Point: Home exercise program (In Progress)     Learning Progress Summary           Patient Acceptance, E,D, NR by  at 4/17/2021 1238    Acceptance, E,TB,D, NR,VU by  at 4/16/2021  1621    Acceptance, E,TB,D, NR by  at 4/15/2021 1454    Acceptance, TB, DU,NR by DJ at 4/14/2021 1212                   Point: Body mechanics (In Progress)     Learning Progress Summary           Patient Acceptance, E,D, NR by  at 4/17/2021 1238    Acceptance, E,TB,D, NR,VU by  at 4/16/2021 1621    Acceptance, E,TB,D, NR by  at 4/15/2021 1454    Acceptance, TB, DU,NR by DJ at 4/14/2021 1212                   Point: Precautions (In Progress)     Learning Progress Summary           Patient Acceptance, E,D, NR by  at 4/17/2021 1238    Acceptance, E,TB,D, NR,VU by  at 4/16/2021 1621    Acceptance, E,TB,D, NR by  at 4/15/2021 1454    Acceptance, TB, DU,NR by  at 4/14/2021 1212                               User Key     Initials Effective Dates Name Provider Type Discipline     04/03/18 -  Cherelle Ng, PT Physical Therapist PT     03/07/18 -  Whitney Morgan PTA Physical Therapy Assistant PT     08/20/19 -  Judy Echavarria PTA Physical Therapy Assistant PT     10/25/19 -  Marilee Jiménez, PT Physical Therapist PT              PT Recommendation and Plan     Plan of Care Reviewed With: patient  Progress: no change  Outcome Summary: Pt confused and anxious at beg of PT session. Pt voicing distrust of staff. Pt req a lot of reassurance and support for short transfer to chair w/ mod/max A x2 and use of fww. Pt resting forearms on fww and not following cues for upright posture and B hands on . Pt unsteady and req reassurance during transfer. Pt limited by diminished cognition. Pt not participating in L knee protocol w/ PROM given.  PT will prog as pt nino.     Time Calculation:   PT Charges     Row Name 04/17/21 1239             Time Calculation    Start Time  1147  -PH      Stop Time  1202  -PH      Time Calculation (min)  15 min  -PH      PT Received On  04/17/21  -PH      PT - Next Appointment  04/18/21  -PH        User Key  (r) = Recorded By, (t) = Taken By, (c) = Cosigned By     Initials Name Provider Type    Judy Mccloud PTA Physical Therapy Assistant        Therapy Charges for Today     Code Description Service Date Service Provider Modifiers Qty    91994122539 HC PT THERAPEUTIC ACT EA 15 MIN 4/17/2021 Judy Echavarria PTA GP 1    27991516340 HC PT THER SUPP EA 15 MIN 4/17/2021 Judy Echavarria PTA GP 1          PT G-Codes  Outcome Measure Options: AM-PAC 6 Clicks Basic Mobility (PT)  AM-PAC 6 Clicks Score (PT): 10    Judy Echavarria PTA  4/17/2021

## 2021-04-17 NOTE — PROGRESS NOTES
"DAILY PROGRESS NOTE  [facility]    Patient Identification:  Name: Jenn Dupont  Age: 75 y.o.  Sex: female  :  1945  MRN: [id number]         Primary Care Physician: Nima Doip MD    Subjective:  Interval History:She complains of pain.  She is on 3 l per nc    Objective:    Scheduled Meds:allopurinol, 100 mg, Oral, Daily  aspirin, 325 mg, Oral, BID With Meals  atorvastatin, 20 mg, Oral, Daily  insulin lispro, 0-7 Units, Subcutaneous, TID AC  ipratropium-albuterol, 3 mL, Nebulization, 4x Daily - RT  metoprolol tartrate, 25 mg, Oral, Nightly  potassium chloride, 10 mEq, Oral, Daily  sodium chloride, 3 mL, Intravenous, Q12H  sodium chloride, 4 mL, Nebulization, BID - RT  triamterene-hydrochlorothiazide, 1 tablet, Oral, Daily  [START ON 2021] vilazodone, 10 mg, Oral, Daily      Continuous Infusions:     Vital signs in last 24 hours:  Temp:  [97.8 °F (36.6 °C)-98.1 °F (36.7 °C)] 98 °F (36.7 °C)  Heart Rate:  [] 111  Resp:  [18-22] 20  BP: (131-146)/(69-87) 131/69    Intake/Output:    Intake/Output Summary (Last 24 hours) at 2021 1550  Last data filed at 2021 1420  Gross per 24 hour   Intake 210 ml   Output 1400 ml   Net -1190 ml       Exam:  /69 (BP Location: Left arm, Patient Position: Lying)   Pulse 111   Temp 98 °F (36.7 °C) (Oral)   Resp 20   Ht 152.4 cm (60\")   Wt 110 kg (243 lb 6.2 oz)   SpO2 91%   BMI 47.53 kg/m²     General Appearance:    Alert, cooperative, no distress   Head:    Normocephalic, without obvious abnormality, atraumatic   Eyes:       Throat:   Lips, tongue, gums normal   Neck:   Supple, symmetrical, trachea midline, no JVD   Lungs:     Clear to auscultation bilaterally, respirations unlabored   Chest Wall:    No tenderness or deformity    Heart:    Regular rate and rhythm, S1 and S2 normal, no murmur,no  Rub or gallop   Abdomen:     Soft, nontender, bowel sounds active, no masses, no organomegaly    Extremities:   Extremities " normal, left knee surgical changes, no cyanosis or edema   Pulses:      Skin:   Skin is warm and dry,  no rashes or palpable lesions   Neurologic:   no focal deficits noted      Lab Results (last 72 hours)     Procedure Component Value Units Date/Time    POC Glucose Once [[id number]]  (Abnormal) Collected: 04/14/21 1032    Specimen: Blood Updated: 04/14/21 1037     Glucose 292 mg/dL     Basic Metabolic Panel [[id number]]  (Abnormal) Collected: 04/14/21 0640    Specimen: Blood Updated: 04/14/21 0745     Glucose 255 mg/dL      BUN 19 mg/dL      Creatinine 0.71 mg/dL      Sodium 141 mmol/L      Potassium 4.3 mmol/L      Chloride 101 mmol/L      CO2 31.5 mmol/L      Calcium 9.0 mg/dL      eGFR  African Amer 98 mL/min/1.73      BUN/Creatinine Ratio 26.8     Anion Gap 8.5 mmol/L     Narrative:      GFR Normal >60  Chronic Kidney Disease <60  Kidney Failure <15      CBC (No Diff) [[id number]]  (Abnormal) Collected: 04/14/21 0640    Specimen: Blood Updated: 04/14/21 0711     WBC 10.48 10*3/mm3      RBC 5.05 10*6/mm3      Hemoglobin 14.0 g/dL      Hematocrit 45.4 %      MCV 89.9 fL      MCH 27.7 pg      MCHC 30.8 g/dL      RDW 15.2 %      RDW-SD 50.2 fl      MPV 10.5 fL      Platelets 214 10*3/mm3     POC Glucose Once [[id number]]  (Abnormal) Collected: 04/14/21 0649    Specimen: Blood Updated: 04/14/21 0652     Glucose 261 mg/dL     Blood Gas, Arterial - [[id number]]  (Abnormal) Collected: 04/13/21 2332    Specimen: Arterial Blood Updated: 04/13/21 2337     Site Arterial: right radial     Moy's Test Positive     pH, Arterial 7.327 pH units      pCO2, Arterial 57.8 mm Hg      Comment: Critical:Notify RAMIREZ GONZALEZ (13-Apr-21 23:35:44)Read back ok        pO2, Arterial 87.2 mm Hg      HCO3, Arterial 30.2 mmol/L      Base Excess, Arterial 2.6 mmol/L      O2 Saturation Calculated 95.6 %      Barometric Pressure for Blood Gas 751.8 mmHg      Modality HFNC     Flow Rate 6 lpm      Rate 18 Breaths/minute     BNP [[id number]]   (Normal) Collected: 04/13/21 2240    Specimen: Blood Updated: 04/13/21 2330     proBNP 125.2 pg/mL     Narrative:      Among patients with dyspnea, NT-proBNP is highly sensitive for the detection of acute congestive heart failure. In addition NT-proBNP of <300 pg/ml effectively rules out acute congestive heart failure with 99% negative predictive value.    Results may be falsely decreased if patient taking Biotin.      Troponin [[id number]]  (Normal) Collected: 04/13/21 2240    Specimen: Blood Updated: 04/13/21 2330     Troponin T <0.010 ng/mL     Narrative:      Troponin T Reference Range:  <= 0.03 ng/mL-   Negative for AMI  >0.03 ng/mL-     Abnormal for myocardial necrosis.  Clinicians would have to utilize clinical acumen, EKG, Troponin and serial changes to determine if it is an Acute Myocardial Infarction or myocardial injury due to an underlying chronic condition.       Results may be falsely decreased if patient taking Biotin.      Comprehensive Metabolic Panel [[id number]]  (Abnormal) Collected: 04/13/21 2240    Specimen: Blood Updated: 04/13/21 2325     Glucose 212 mg/dL      BUN 21 mg/dL      Creatinine 0.68 mg/dL      Sodium 138 mmol/L      Potassium 3.5 mmol/L      Chloride 98 mmol/L      CO2 30.8 mmol/L      Calcium 8.8 mg/dL      Total Protein 6.7 g/dL      Albumin 3.50 g/dL      ALT (SGPT) 60 U/L      AST (SGOT) 77 U/L      Alkaline Phosphatase 122 U/L      Total Bilirubin 0.4 mg/dL      eGFR  African Amer 103 mL/min/1.73      Globulin 3.2 gm/dL      A/G Ratio 1.1 g/dL      BUN/Creatinine Ratio 30.9     Anion Gap 9.2 mmol/L     Narrative:      GFR Normal >60  Chronic Kidney Disease <60  Kidney Failure <15      CBC & Differential [[id number]]  (Abnormal) Collected: 04/13/21 2240    Specimen: Blood Updated: 04/13/21 2320    Narrative:      The following orders were created for panel order CBC & Differential.  Procedure                               Abnormality         Status                     ---------                                -----------         ------                     CBC Auto Differential[[id number]]        Abnormal            Final result                 Please view results for these tests on the individual orders.    CBC Auto Differential [[id number]]  (Abnormal) Collected: 04/13/21 2240    Specimen: Blood Updated: 04/13/21 2320     WBC 8.26 10*3/mm3      RBC 5.09 10*6/mm3      Hemoglobin 13.6 g/dL      Hematocrit 43.1 %      MCV 84.7 fL      MCH 26.7 pg      MCHC 31.6 g/dL      RDW 14.9 %      RDW-SD 45.3 fl      MPV 10.6 fL      Platelets 231 10*3/mm3      Neutrophil % 93.3 %      Lymphocyte % 4.1 %      Monocyte % 2.1 %      Eosinophil % 0.0 %      Basophil % 0.1 %      Immature Grans % 0.4 %      Neutrophils, Absolute 7.71 10*3/mm3      Lymphocytes, Absolute 0.34 10*3/mm3      Monocytes, Absolute 0.17 10*3/mm3      Eosinophils, Absolute 0.00 10*3/mm3      Basophils, Absolute 0.01 10*3/mm3      Immature Grans, Absolute 0.03 10*3/mm3      nRBC 0.0 /100 WBC     POC Glucose Once [[id number]]  (Abnormal) Collected: 04/13/21 2114    Specimen: Blood Updated: 04/13/21 2135     Glucose 207 mg/dL     POC Glucose Once [[id number]]  (Abnormal) Collected: 04/13/21 1643    Specimen: Blood Updated: 04/13/21 1649     Glucose 205 mg/dL     COVID PRE-OP / PRE-PROCEDURE SCREENING ORDER (NO ISOLATION) - Swab, Nasopharynx [[id number]]  (Normal) Collected: 04/13/21 0939    Specimen: Swab from Nasopharynx Updated: 04/13/21 1038    Narrative:      The following orders were created for panel order COVID PRE-OP / PRE-PROCEDURE SCREENING ORDER (NO ISOLATION) - Swab, Nasopharynx.  Procedure                               Abnormality         Status                     ---------                               -----------         ------                     COVID-19[facility] IN-HOUSE...[[id number]]  Normal              Final result                 Please view results for these tests on the individual orders.    COVID-19,[facility] IN-HOUSE  CEPHEID, NP SWAB IN TRANSPORT MEDIA 8-12 HR TAT - Swab, Nasopharynx [[id number]]  (Normal) Collected: 04/13/21 0939    Specimen: Swab from Nasopharynx Updated: 04/13/21 1038     COVID19 Not Detected    Narrative:      Fact sheet for providers: https://www.fda.gov/media/006018/download     Fact sheet for patients: https://www.fda.gov/media/767766/download    POC Glucose Once [[id number]]  (Abnormal) Collected: 04/13/21 1024    Specimen: Blood Updated: 04/13/21 1027     Glucose 132 mg/dL     SCANNED - LABS [[id number]] Resulted: 04/13/21     Updated: 04/12/21 1545        Data Review:  Results from last 7 days   Lab Units 04/16/21  0656 04/15/21  0454 04/14/21  0640   SODIUM mmol/L 139 141 141   POTASSIUM mmol/L 3.9 4.0 4.3   CHLORIDE mmol/L 99 101 101   CO2 mmol/L 33.8* 31.1* 31.5*   BUN mg/dL 19 26* 19   CREATININE mg/dL 0.47* 0.69 0.71   GLUCOSE mg/dL 133* 171* 255*   CALCIUM mg/dL 9.1 8.1* 9.0     Results from last 7 days   Lab Units 04/16/21  0656 04/15/21  0454 04/14/21  0640   WBC 10*3/mm3 7.67 7.60 10.48   HEMOGLOBIN g/dL 13.1 12.6 14.0   HEMATOCRIT % 43.0 40.4 45.4   PLATELETS 10*3/mm3 171 203 214             Lab Results   Lab Value Date/Time    TROPONINT <0.010 04/13/2021 2240         Results from last 7 days   Lab Units 04/13/21  2240   ALK PHOS U/L 122*   BILIRUBIN mg/dL 0.4   ALT (SGPT) U/L 60*   AST (SGOT) U/L 77*             Glucose   Date/Time Value Ref Range Status   04/17/2021 1109 150 (H) 70 - 130 mg/dL Final   04/17/2021 0649 160 (H) 70 - 130 mg/dL Final   04/16/2021 2028 168 (H) 70 - 130 mg/dL Final   04/16/2021 1615 163 (H) 70 - 130 mg/dL Final   04/16/2021 1106 123 70 - 130 mg/dL Final   04/16/2021 0628 135 (H) 70 - 130 mg/dL Final   04/16/2021 0253 150 (H) 70 - 130 mg/dL Final   04/15/2021 2137 163 (H) 70 - 130 mg/dL Final           Past Medical History:   Diagnosis Date   • Asthma     ALLERGY INDUCED    • Coronary artery disease    • Diabetes mellitus (CMS/HCC)    • Elevated cholesterol    •  Hypertension    • Knee pain, left    • Low back pain    • MI (myocardial infarction) (CMS/[facility]) 2013   • Pulmonary embolism (CMS/[facility])     2009   • Shoulder pain, left    • Urinary frequency        Assessment:  Active Hospital Problems    Diagnosis  POA   • **Aseptic loosening of prosthetic knee (CMS/[facility]) [T84.038A, Z96.659]  Not Applicable   • Acute on chronic respiratory failure with hypoxia and hypercapnia (CMS/[facility]) [J96.21, J96.22]  Unknown   • Coronary artery disease [I25.10]  Unknown   • Diabetes mellitus (CMS/[facility]) [E11.9]  Unknown   • Asthma [J45.909]  Unknown   • Hypertension [I10]  Unknown   • DJD (degenerative joint disease) [M19.90]  Yes      Resolved Hospital Problems   No resolved problems to display.       Plan:  Continue current RX. Will follow.Holding some BP meds. Sliding scale insulin.  Will need SNU for rehab.  When stable.    Booker Acosta MD  4/17/2021  15:50 EDT

## 2021-04-17 NOTE — NURSING NOTE
Spoke with Dr More about inability to keep the Bipap on due to patient agitation. Understood and instructed to keep oxygen minimal and to keep sats between 88-94%. Informed MT. Also instructed to d/c valium as it can cause the agitation.

## 2021-04-17 NOTE — PROGRESS NOTES
Dr. DESMOND More    [address]    4/17/2021    Patient ID:  Name:  Jenn Dupont  MRN:  [id number]  1945  75 y.o.  female            CC/Reason for visit: Status post left total knee arthroplasty revision, acute hypoxic and hypercapnic respiratory failure, atelectasis, paranoia    Interval hx: Patient is angry, is paranoid.  Wants to yvrose the hospital.  Has no particular complaints today in terms of shortness of breath or chest pain.  Still on supplemental oxygen.    ROS: No abdominal pain, no chest pain, no palpitations, some soreness in her left knee    Vitals:  Vitals:    04/17/21 0039 04/17/21 0343 04/17/21 0741 04/17/21 1211   BP: 146/87  142/76    BP Location: Left arm  Left arm    Patient Position: Lying  Lying    Pulse: 98 96 105 104   Resp: 20  20 20   Temp: 98.1 °F (36.7 °C)  97.8 °F (36.6 °C)    TempSrc: Oral  Oral    SpO2: 98% 97% 96% 95%   Weight:       Height:               Body mass index is 47.53 kg/m².    Intake/Output Summary (Last 24 hours) at 4/17/2021 1322  Last data filed at 4/16/2021 1939  Gross per 24 hour   Intake 210 ml   Output 900 ml   Net -690 ml       Exam:  GEN:  Morbidly obese.  She is paranoid, apprehensive  Alert, suspicious.  Oriented only x2  LUNGS: Overall distant and diminished breath sounds bilat, no use of accessory muscles  CV:  Normal S1S2, without murmur, there is edema both legs  ABD:  Non tender, morbid obesity hampers rest of the exam      Scheduled meds:  allopurinol, 100 mg, Oral, Daily  aspirin, 325 mg, Oral, BID With Meals  atorvastatin, 20 mg, Oral, Daily  ferrous sulfate, 325 mg, Oral, Daily With Breakfast  insulin lispro, 0-7 Units, Subcutaneous, TID AC  ipratropium-albuterol, 3 mL, Nebulization, 4x Daily - RT  metoprolol tartrate, 25 mg, Oral, Nightly  potassium chloride, 10 mEq, Oral, Daily  sodium chloride, 3 mL, Intravenous, Q12H  sodium chloride, 4 mL, Nebulization, BID - RT  triamterene-hydrochlorothiazide, 1 tablet, Oral,  Daily  vilazodone, 40 mg, Oral, Daily      IV meds:                           Data Review:   I reviewed the patient's medications and new clinical results.    COVID19   Date Value Ref Range Status   04/13/2021 Not Detected Not Detected - Ref. Range Final         Lab Results   Component Value Date    CALCIUM 9.1 04/16/2021    MG 1.6 08/23/2018     Results from last 7 days   Lab Units 04/16/21  0656 04/15/21  0454 04/14/21  0640 04/13/21  2240   SODIUM mmol/L 139 141 141 138   POTASSIUM mmol/L 3.9 4.0 4.3 3.5   CHLORIDE mmol/L 99 101 101 98   CO2 mmol/L 33.8* 31.1* 31.5* 30.8*   BUN mg/dL 19 26* 19 21   CREATININE mg/dL 0.47* 0.69 0.71 0.68   CALCIUM mg/dL 9.1 8.1* 9.0 8.8   BILIRUBIN mg/dL  --   --   --  0.4   ALK PHOS U/L  --   --   --  122*   ALT (SGPT) U/L  --   --   --  60*   AST (SGOT) U/L  --   --   --  77*   GLUCOSE mg/dL 133* 171* 255* 212*   WBC 10*3/mm3 7.67 7.60 10.48 8.26   HEMOGLOBIN g/dL 13.1 12.6 14.0 13.6   PLATELETS 10*3/mm3 171 203 214 231   PROBNP pg/mL  --   --   --  125.2       ASSESSMENT:   Acute hypoxic and hypercapnic respiratory failure  Atelectasis  Morbid obesity interfering with medical care  Diabetes mellitus type 2  History of smoking  COPD  Probable sleep apnea  Status post revision arthroplasty left knee      PLAN:  Wean oxygen as tolerated.  Goal oxygen saturation should be between 88% and 94%.  Stop Valium which can cause paranoia and hyperactive state and elderly.  Ambulate patient with physical therapy.  Reduce Vybrid dose.  Continue to reorient, show sunlight in daylight through the window, increase mobility.        Ventura More MD  4/17/2021

## 2021-04-17 NOTE — PROGRESS NOTES
Orthopaedic Surgery  Progress Note  4/17/2021    Patients Name:  Jenn Dupont  YOB: 1945  Age: 75 y.o.  Medical Records Number:  [id number]  Date of Admission: 4/13/2021    No complaints except pain    Vitals:  Vitals:    04/16/21 2008 04/17/21 0039 04/17/21 0343 04/17/21 0741   BP:  146/87  142/76   BP Location:  Left arm  Left arm   Patient Position:  Lying  Lying   Pulse: 95 98 96 105   Resp: 20 20  20   Temp:  98.1 °F (36.7 °C)  97.8 °F (36.6 °C)   TempSrc:  Oral  Oral   SpO2: 99% 98% 97% 96%   Weight:       Height:           LLE:  NVI, calf nontender, Sensation intact  No signs of DVT    Incision: clean, no signs of infection    Lab Results (last 24 hours)     Procedure Component Value Units Date/Time    POC Glucose Once [[id number]]  (Abnormal) Collected: 04/17/21 0649    Specimen: Blood Updated: 04/17/21 0651     Glucose 160 mg/dL     POC Glucose Once [[id number]]  (Abnormal) Collected: 04/16/21 2028    Specimen: Blood Updated: 04/16/21 2030     Glucose 168 mg/dL     POC Glucose Once [[id number]]  (Abnormal) Collected: 04/16/21 1615    Specimen: Blood Updated: 04/16/21 1618     Glucose 163 mg/dL           XR Chest 2 View    Result Date: 3/30/2021  Narrative:  DATE OF EXAM: 3/30/2021 1:45 PM  PROCEDURE: XR CHEST 2 VW-  INDICATIONS: preop; M25.50-Pain in unspecified joint; Z01.818-Encounter for other preprocedural examination  COMPARISON: No Comparisons Available  TECHNIQUE: PA and lateral views of the chest were obtained.  FINDINGS: Heart size within normal limits. Mild tortuosity descending thoracic aorta. No focal consolidation, pneumothorax, or large pleural effusion. Multilevel disc narrowing the thoracic spine.      Impression: No acute process.  Electronically Signed By-Piotr Hayes MD On:3/30/2021 2:05 PM This report was finalized on [id number] by  Piotr Hayes MD.    XR Knee 1 or 2 View Left    Result Date: 4/13/2021  Narrative: EXAMINATION: 2 VIEWS OF THE LEFT KNEE  HISTORY:  75-year-old female status post total left knee arthroplasty  FINDINGS: AP and lateral radiographs of the left knee were obtained. Comparison is made to prior left knee radiographs dated 06/23/2015. There has been interval revision of the left knee arthroplasty components with placement of femoral and tibial components that contain interfemoral shafts. Overlying skin staples are noted. There is no evidence for a fracture. Gas is seen within the surrounding soft tissues.      Impression: Status post recent revision of total left knee arthroplasty without evidence for an acute abnormality.  This report was finalized on 4/13/2021 8:51 PM by Dr. Shaka Montero M.D.      CT Angiogram Chest With & Without Contrast    Result Date: 4/15/2021  Narrative: CT ANGIOGRAM OF THE CHEST. MULTIPLE CORONAL, SAGITTAL, AND 3-D RECONSTRUCTIONS.  HISTORY: Recent left knee surgery. Evaluate for pulmonary embolus.  TECHNIQUE: Radiation dose reduction techniques were utilized, including automated exposure control and exposure modulation based on body size. CT angiogram of the chest was performed following the administration of IV contrast. Coronal, sagittal, and 3-D reconstruction images were obtained.  COMPARISON:  AP chest 04/13/2021. No previous CTs for comparison  FINDINGS: There is pulmonary arterial enlargement. Evaluation of the distal segmental and subsegmental branches is very limited due to a combination of motion artifact and density of contrast within the pulmonary arteries. No central pulmonary embolus is evident. There are mild-to-moderate coronary arterial calcifications. There is no pericardial effusion.  Patchy bibasilar opacities are present greater within the right lower lobe where there is hazy, ground-glass opacity suspicious for mild infiltrate. There is similar opacity within the posterior right upper lobe. There is no evidence for aortic dissection. The descending thoracic aorta is tortuous.  Imaging through the  upper abdomen demonstrates diffuse fat infiltration of the liver. There is multilevel degenerative disc disease within the thoracolumbar spine.      Impression: 1. Evaluation for distal segmental and subsegmental embolus is very limited on this exam. No evidence for central embolus. There is pulmonary arterial enlargement. 2. Bilateral lower lobe and lingular atelectasis as well as hazy opacities within the right lower lobe and posterior right upper lobe suspicious for superimposed infiltrates. 3. Atherosclerotic disease with mild-to-moderate coronary arterial calcifications. 4. Diffuse fat infiltration of the liver.  This report was finalized on 4/15/2021 6:28 PM by Dr. Gareth Inman M.D.      XR Chest 1 View    Result Date: 4/13/2021  Narrative: Patient: YOBANI BLOUNT  Time Out: 22:53 Exam(s): FILM CXR 1 VIEW EXAM:   XR Chest, 1 View CLINICAL HISTORY:    hypoxia  Reason for exam: hypoxia. TECHNIQUE:   Frontal view of the chest. COMPARISON:   2 1 2016. FINDINGS:   Lungs:  Diffuse bilateral lung opacities with subpleural fissural thickening.   Pleural space:  No pneumothorax or significant pleural effusions.   Heart:  Mild cardiomegaly.   Mediastinum:  Unremarkable.   Bones joints:  Multilevel degenerative changes of the thoracic spine.   Vasculature:  Calcified thoracic aorta. IMPRESSION:       Mild CHF.     Impression: Electronically signed by Joseph Peña M.D. on 04-13-21 at 2253    Duplex Venous Lower Extremity - Bilateral CAR    Result Date: 4/16/2021  Narrative: · Normal bilateral lower extremity venous duplex scan.      Peripheral Block    Result Date: 4/13/2021  Narrative: Shoaib Mcmillan MD     4/13/2021 12:24 PM Peripheral Block Pre-sedation assessment completed: 4/13/2021 12:20 PM Patient reassessed immediately prior to procedure Patient location during procedure: holding area Start time: 4/13/2021 12:20 PM Stop time: 4/13/2021 12:24 PM Reason for block: at surgeon's request and post-op pain  management Performed by Anesthesiologist: Shoaib Mcmillan MD Preanesthetic Checklist Completed: patient identified, IV checked, site marked, risks and benefits discussed, surgical consent, monitors and equipment checked, pre-op evaluation and timeout performed Prep: Sterile barriers:cap, gloves and mask Prep: ChloraPrep Patient monitoring: blood pressure monitoring, continuous pulse oximetry and EKG Procedure Sedation:yes Performed under: local infiltration Guidance:ultrasound guided ULTRASOUND INTERPRETATION.  Using ultrasound guidance a 21 G gauge needle was placed in close proximity to the femoral nerve, at which point, under ultrasound guidance anesthetic was injected in the area of the nerve and spread of the anesthesia was seen on ultrasound in close proximity thereto.  There were no abnormalities seen on ultrasound; a digital image was taken; and the patient tolerated the procedure with no complications. Images:still images obtained Laterality:left Block Type:femoral and adductor canal block Injection Technique:single-shot Needle Type:echogenic Resistance on Injection: none Medications Used: ropivacaine (NAROPIN) 0.5 % injection, 30 mL Post Assessment Injection Assessment: negative aspiration for heme, no paresthesia on injection and incremental injection Patient Tolerance:comfortable throughout block Complications:no Additional Notes Ultrasound Interpretation:  Using ultrasound guidance the needle was placed in close proximity to the femoral nerve and anesthetic was injected in the area of the femoral nerve and spread of the anesthetic was seen on ultrasound in close proximity thereto.  There were no abnormalities seen on ultrasound; a digital image was taken; and the patient tolerated the procedure with no complications.  Block placed for postoperative pain control per surgeon request.       Assesment/Plan:    Procedures:  Left TKA Revision  Postoperative Day: 1  Weightbearing Status:  WBAT with  walker  DVT Prophylaxis:  ASA for DVT prophylaxis    Disposition:  Rehab when medically stable    Rahat Spence PA-C  [facility]  [address] [place], [place] [address] [phone]    4/17/2021

## 2021-04-17 NOTE — PLAN OF CARE
Goal Outcome Evaluation:  Plan of Care Reviewed With: patient  Progress: no change  Outcome Summary: no significant changes, answer orientation questions appropriately but still seems very confused, stopped valium and percocet because of confusion, worked with PT, purewick in place, had BM today, only wore Bipap for short amount of time, currently on 3L, maintain o2 sats between 88-94%, vss, will continue to monitor

## 2021-04-18 LAB
ANION GAP SERPL CALCULATED.3IONS-SCNC: 8.8 MMOL/L (ref 5–15)
BASOPHILS # BLD AUTO: 0.03 10*3/MM3 (ref 0–0.2)
BASOPHILS NFR BLD AUTO: 0.4 % (ref 0–1.5)
BUN SERPL-MCNC: 16 MG/DL (ref 8–23)
BUN/CREAT SERPL: 36.4 (ref 7–25)
CALCIUM SPEC-SCNC: 9.3 MG/DL (ref 8.6–10.5)
CHLORIDE SERPL-SCNC: 95 MMOL/L (ref 98–107)
CO2 SERPL-SCNC: 33.2 MMOL/L (ref 22–29)
CREAT SERPL-MCNC: 0.44 MG/DL (ref 0.57–1)
DEPRECATED RDW RBC AUTO: 47 FL (ref 37–54)
EOSINOPHIL # BLD AUTO: 0.13 10*3/MM3 (ref 0–0.4)
EOSINOPHIL NFR BLD AUTO: 1.7 % (ref 0.3–6.2)
ERYTHROCYTE [DISTWIDTH] IN BLOOD BY AUTOMATED COUNT: 15.1 % (ref 12.3–15.4)
GFR SERPL CREATININE-BSD FRML MDRD: >150 ML/MIN/1.73
GLUCOSE BLDC GLUCOMTR-MCNC: 143 MG/DL (ref 70–130)
GLUCOSE BLDC GLUCOMTR-MCNC: 153 MG/DL (ref 70–130)
GLUCOSE BLDC GLUCOMTR-MCNC: 155 MG/DL (ref 70–130)
GLUCOSE BLDC GLUCOMTR-MCNC: 160 MG/DL (ref 70–130)
GLUCOSE SERPL-MCNC: 171 MG/DL (ref 65–99)
HCT VFR BLD AUTO: 40.9 % (ref 34–46.6)
HGB BLD-MCNC: 13 G/DL (ref 12–15.9)
IMM GRANULOCYTES # BLD AUTO: 0.03 10*3/MM3 (ref 0–0.05)
IMM GRANULOCYTES NFR BLD AUTO: 0.4 % (ref 0–0.5)
LYMPHOCYTES # BLD AUTO: 0.93 10*3/MM3 (ref 0.7–3.1)
LYMPHOCYTES NFR BLD AUTO: 12.1 % (ref 19.6–45.3)
MCH RBC QN AUTO: 27.1 PG (ref 26.6–33)
MCHC RBC AUTO-ENTMCNC: 31.8 G/DL (ref 31.5–35.7)
MCV RBC AUTO: 85.2 FL (ref 79–97)
MONOCYTES # BLD AUTO: 0.64 10*3/MM3 (ref 0.1–0.9)
MONOCYTES NFR BLD AUTO: 8.4 % (ref 5–12)
NEUTROPHILS NFR BLD AUTO: 5.9 10*3/MM3 (ref 1.7–7)
NEUTROPHILS NFR BLD AUTO: 77 % (ref 42.7–76)
NRBC BLD AUTO-RTO: 0 /100 WBC (ref 0–0.2)
PLATELET # BLD AUTO: 238 10*3/MM3 (ref 140–450)
PMV BLD AUTO: 10.8 FL (ref 6–12)
POTASSIUM SERPL-SCNC: 3.3 MMOL/L (ref 3.5–5.2)
RBC # BLD AUTO: 4.8 10*6/MM3 (ref 3.77–5.28)
SODIUM SERPL-SCNC: 137 MMOL/L (ref 136–145)
WBC # BLD AUTO: 7.66 10*3/MM3 (ref 3.4–10.8)

## 2021-04-18 PROCEDURE — 97530 THERAPEUTIC ACTIVITIES: CPT | Performed by: PHYSICAL THERAPIST

## 2021-04-18 PROCEDURE — 94799 UNLISTED PULMONARY SVC/PX: CPT

## 2021-04-18 PROCEDURE — 82962 GLUCOSE BLOOD TEST: CPT

## 2021-04-18 PROCEDURE — 63710000001 INSULIN LISPRO (HUMAN) PER 5 UNITS: Performed by: HOSPITALIST

## 2021-04-18 PROCEDURE — 80048 BASIC METABOLIC PNL TOTAL CA: CPT | Performed by: HOSPITALIST

## 2021-04-18 PROCEDURE — 85025 COMPLETE CBC W/AUTO DIFF WBC: CPT | Performed by: HOSPITALIST

## 2021-04-18 RX ORDER — POTASSIUM CHLORIDE 750 MG/1
40 TABLET, FILM COATED, EXTENDED RELEASE ORAL ONCE
Status: COMPLETED | OUTPATIENT
Start: 2021-04-18 | End: 2021-04-18

## 2021-04-18 RX ADMIN — OXYCODONE HYDROCHLORIDE AND ACETAMINOPHEN 1 TABLET: 5; 325 TABLET ORAL at 12:11

## 2021-04-18 RX ADMIN — ATORVASTATIN CALCIUM 20 MG: 20 TABLET, FILM COATED ORAL at 09:23

## 2021-04-18 RX ADMIN — IPRATROPIUM BROMIDE AND ALBUTEROL SULFATE 3 ML: 2.5; .5 SOLUTION RESPIRATORY (INHALATION) at 15:35

## 2021-04-18 RX ADMIN — ACETAMINOPHEN 650 MG: 325 TABLET, FILM COATED ORAL at 09:25

## 2021-04-18 RX ADMIN — IPRATROPIUM BROMIDE AND ALBUTEROL SULFATE 3 ML: 2.5; .5 SOLUTION RESPIRATORY (INHALATION) at 19:27

## 2021-04-18 RX ADMIN — Medication 4 ML: at 07:26

## 2021-04-18 RX ADMIN — SODIUM CHLORIDE, PRESERVATIVE FREE 3 ML: 5 INJECTION INTRAVENOUS at 09:23

## 2021-04-18 RX ADMIN — ASPIRIN 325 MG: 325 TABLET, COATED ORAL at 17:19

## 2021-04-18 RX ADMIN — INSULIN LISPRO 2 UNITS: 100 INJECTION, SOLUTION INTRAVENOUS; SUBCUTANEOUS at 17:19

## 2021-04-18 RX ADMIN — INSULIN LISPRO 2 UNITS: 100 INJECTION, SOLUTION INTRAVENOUS; SUBCUTANEOUS at 07:06

## 2021-04-18 RX ADMIN — POTASSIUM CHLORIDE 40 MEQ: 750 TABLET, EXTENDED RELEASE ORAL at 17:19

## 2021-04-18 RX ADMIN — VILAZODONE HYDROCHLORIDE 10 MG: 10 TABLET ORAL at 09:22

## 2021-04-18 RX ADMIN — POTASSIUM CHLORIDE 10 MEQ: 750 TABLET, EXTENDED RELEASE ORAL at 09:22

## 2021-04-18 RX ADMIN — METOPROLOL TARTRATE 25 MG: 25 TABLET, FILM COATED ORAL at 21:09

## 2021-04-18 RX ADMIN — ASPIRIN 325 MG: 325 TABLET, COATED ORAL at 09:23

## 2021-04-18 RX ADMIN — IPRATROPIUM BROMIDE AND ALBUTEROL SULFATE 3 ML: 2.5; .5 SOLUTION RESPIRATORY (INHALATION) at 11:05

## 2021-04-18 RX ADMIN — IPRATROPIUM BROMIDE AND ALBUTEROL SULFATE 3 ML: 2.5; .5 SOLUTION RESPIRATORY (INHALATION) at 07:26

## 2021-04-18 RX ADMIN — Medication 4 ML: at 19:27

## 2021-04-18 RX ADMIN — TRIAMTERENE AND HYDROCHLOROTHIAZIDE 1 TABLET: 75; 50 TABLET ORAL at 09:22

## 2021-04-18 RX ADMIN — ALLOPURINOL 100 MG: 100 TABLET ORAL at 09:22

## 2021-04-18 RX ADMIN — OXYCODONE HYDROCHLORIDE AND ACETAMINOPHEN 1 TABLET: 5; 325 TABLET ORAL at 21:09

## 2021-04-18 RX ADMIN — SODIUM CHLORIDE, PRESERVATIVE FREE 3 ML: 5 INJECTION INTRAVENOUS at 21:09

## 2021-04-18 NOTE — PLAN OF CARE
Goal Outcome Evaluation:  Plan of Care Reviewed With: patient  Progress: improving  Outcome Summary: more oriented today, not as hostile and paranoid, intermittent pain in legs, up to bedside commode for BM, plan to hopefully d/c to [place] tomorrow, 3L O2, NSR, vss, will continue to monitor

## 2021-04-18 NOTE — THERAPY TREATMENT NOTE
Patient Name: Jenn Dupont  : 1945    MRN: [id number]                              Today's Date: 2021       Admit Date: 2021    Visit Dx:     ICD-10-CM ICD-9-CM   1. Mechanical loosening of prosthetic knee, initial encounter (CMS/[facility])  T84.038A 996.41    Z96.659 V43.65     Patient Active Problem List   Diagnosis   • Aseptic loosening of prosthetic knee (CMS/[facility])   • DJD (degenerative joint disease)   • Coronary artery disease   • Diabetes mellitus (CMS/[facility])   • Asthma   • Hypertension   • Acute on chronic respiratory failure with hypoxia and hypercapnia (CMS/[facility])     Past Medical History:   Diagnosis Date   • Asthma     ALLERGY INDUCED    • Coronary artery disease    • Diabetes mellitus (CMS/[facility])    • Elevated cholesterol    • Hypertension    • Knee pain, left    • Low back pain    • MI (myocardial infarction) (CMS/[facility])    • Pulmonary embolism (CMS/[facility])        • Shoulder pain, left    • Urinary frequency      Past Surgical History:   Procedure Laterality Date   • CHOLECYSTECTOMY     • CORONARY ANGIOPLASTY WITH STENT PLACEMENT     • HYSTERECTOMY     • TONSILLECTOMY     • TOTAL KNEE ARTHROPLASTY Left    • TOTAL KNEE ARTHROPLASTY Right      General Information     Row Name 21 1602          Physical Therapy Time and Intention    Document Type  therapy note (daily note)  -     Mode of Treatment  physical therapy  -     Row Name 21 1602          General Information    Patient Profile Reviewed  yes  -     Existing Precautions/Restrictions  fall;oxygen therapy device and L/min  -     Row Name 21 1602          Cognition    Orientation Status (Cognition)  oriented x 3  -     Row Name 21 1602          Safety Issues, Functional Mobility    Impairments Affecting Function (Mobility)  balance;coordination;endurance/activity tolerance;pain;range of motion (ROM);strength;cognition  -       User Key  (r) = Recorded By, (t) = Taken By, (c) = Cosigned By     Initials Name Provider Type     Matthew Marsh, PT DPT Physical Therapist        Mobility     Row Name 04/18/21 1602          Bed Mobility    Bed Mobility  supine-sit;sit-supine  -     Supine-Sit [place] (Bed Mobility)  minimum assist (75% patient effort)  -     Sit-Supine [place] (Bed Mobility)  moderate assist (50% patient effort)  -     Assistive Device (Bed Mobility)  bed rails;head of bed elevated  -     Row Name 04/18/21 1602          Sit-Stand Transfer    Sit-Stand [place] (Transfers)  moderate assist (50% patient effort);1 person assist  -     Assistive Device (Sit-Stand Transfers)  walker, standard  -     Row Name 04/18/21 1602          Gait/Stairs (Locomotion)    [place] Level (Gait)  1 person assist;moderate assist (50% patient effort)  -     Assistive Device (Gait)  walker, standard  -     Distance in Feet (Gait)  5 ft shuffling by edge of bed  -     Deviations/Abnormal Patterns (Gait)  patti decreased;festinating/shuffling;gait speed decreased;stride length decreased;base of support, wide  -     Bilateral Gait Deviations  forward flexed posture;heel strike decreased  -       User Key  (r) = Recorded By, (t) = Taken By, (c) = Cosigned By    Initials Name Provider Type     Matthew Marsh, PT DPT Physical Therapist        Obj/Interventions    No documentation.       Goals/Plan    No documentation.       Clinical Impression     Row Name 04/18/21 1602          Pain Scale: Numbers Pre/Post-Treatment    Pretreatment Pain Rating  9/10  -     Posttreatment Pain Rating  9/10  -     Pain Intervention(s)  Medication (See MAR);Repositioned  -     Row Name 04/18/21 1609          Plan of Care Review    Plan of Care Reviewed With  patient  -     Progress  improving  -     Outcome Summary  Pt AO x 3. Pt supine in bed. Pt transferred supine to sit with mod x 1. Pt transferred sit to stand with mod x 1. Pt ambulate 5 ft side stepping next to bed with mod x 1. Pt  sit EOB and transfer sit to supine with mod x 1.  -LC     Row Name 04/18/21 1603          Vital Signs    O2 Delivery Pre Treatment  supplemental O2  -LC     O2 Delivery Intra Treatment  supplemental O2  -LC     O2 Delivery Post Treatment  supplemental O2  -LC     Row Name 04/18/21 1603          Positioning and Restraints    Pre-Treatment Position  in bed  -LC     Post Treatment Position  bed  -LC     In Bed  notified nsg;supine;call light within reach;encouraged to call for assist;exit alarm on  -       User Key  (r) = Recorded By, (t) = Taken By, (c) = Cosigned By    Initials Name Provider Type    Matthew Gonzalez, PT DPT Physical Therapist        Outcome Measures     Row Name 04/18/21 1605          How much help from another person do you currently need...    Turning from your back to your side while in flat bed without using bedrails?  2  -LC     Moving from lying on back to sitting on the side of a flat bed without bedrails?  2  -LC     Moving to and from a bed to a chair (including a wheelchair)?  2  -LC     Standing up from a chair using your arms (e.g., wheelchair, bedside chair)?  2  -LC     Climbing 3-5 steps with a railing?  1  -LC     To walk in hospital room?  2  -LC     AM-PAC 6 Clicks Score (PT)  11  -LC     Row Name 04/18/21 1605          Functional Assessment    Outcome Measure Options  AM-PAC 6 Clicks Basic Mobility (PT)  -       User Key  (r) = Recorded By, (t) = Taken By, (c) = Cosigned By    Initials Name Provider Type    Matthew Gonzalez, PT DPT Physical Therapist        Physical Therapy Education                 Title: PT OT SLP Therapies (Done)     Topic: Physical Therapy (Done)     Point: Mobility training (Done)     Learning Progress Summary           Patient Acceptance, E,D, DU,VU,NR by VISHNU at 4/18/2021 1605    Acceptance, E,D, NR by PH at 4/17/2021 1238    Acceptance, E,TB,D, NR,VU by  at 4/16/2021 1621    Acceptance, E,TB,D, NR by JEFFREY at 4/15/2021 1454    Acceptance, TB, DU,NR by  DJ at 4/14/2021 1212                   Point: Home exercise program (Done)     Learning Progress Summary           Patient Acceptance, E,D, DU,VU,NR by  at 4/18/2021 1605    Acceptance, E,D, NR by PH at 4/17/2021 1238    Acceptance, E,TB,D, NR,VU by  at 4/16/2021 1621    Acceptance, E,TB,D, NR by  at 4/15/2021 1454    Acceptance, TB, DU,NR by DJ at 4/14/2021 1212                   Point: Body mechanics (Done)     Learning Progress Summary           Patient Acceptance, E,D, DU,VU,NR by  at 4/18/2021 1605    Acceptance, E,D, NR by  at 4/17/2021 1238    Acceptance, E,TB,D, NR,VU by  at 4/16/2021 1621    Acceptance, E,TB,D, NR by  at 4/15/2021 1454    Acceptance, TB, DU,NR by NIKA at 4/14/2021 1212                   Point: Precautions (Done)     Learning Progress Summary           Patient Acceptance, E,D, DU,VU,NR by  at 4/18/2021 1605    Acceptance, E,D, NR by PH at 4/17/2021 1238    Acceptance, E,TB,D, NR,VU by  at 4/16/2021 1621    Acceptance, E,TB,D, NR by  at 4/15/2021 1454    Acceptance, TB, DU,NR by NIKA at 4/14/2021 1212                               User Key     Initials Effective Dates Name Provider Type Discipline     04/03/18 -  Cherelle Ng, PT Physical Therapist PT     03/07/18 -  Whitney Morgan PTA Physical Therapy Assistant PT     07/02/20 -  Matthew Marsh, PT DPT Physical Therapist PT    PH 08/20/19 -  Judy Echavarria PTA Physical Therapy Assistant PT    DJ 10/25/19 -  Marilee Jiménez, PT Physical Therapist PT              PT Recommendation and Plan     Plan of Care Reviewed With: patient  Progress: improving  Outcome Summary: Pt AO x 3. Pt supine in bed. Pt transferred supine to sit with mod x 1. Pt transferred sit to stand with mod x 1. Pt ambulate 5 ft side stepping next to bed with mod x 1. Pt sit EOB and transfer sit to supine with mod x 1.     Time Calculation:   PT Charges     Row Name 04/18/21 5486             Time Calculation    Start Time  1545  -       Stop Time  1610  -      Time Calculation (min)  25 min  -      PT Received On  04/18/21  -      PT - Next Appointment  04/19/21  -         Time Calculation- PT    Total Timed Code Minutes- PT  25 minute(s)  -         Timed Charges    69712 - PT Therapeutic Activity Minutes  25  -LC        User Key  (r) = Recorded By, (t) = Taken By, (c) = Cosigned By    Initials Name Provider Type     Matthew Marsh, PT DPT Physical Therapist        Therapy Charges for Today     Code Description Service Date Service Provider Modifiers Qty    [id number]  PT THERAPEUTIC ACT EA 15 MIN 4/18/2021 Matthew Marsh, PT DPT GP 2          PT G-Codes  Outcome Measure Options: AM-PAC 6 Clicks Basic Mobility (PT)  AM-PAC 6 Clicks Score (PT): 11    Matthew Marsh, PT DPT  4/18/2021

## 2021-04-18 NOTE — PROGRESS NOTES
Dr. DESMOND More    [address]    4/18/2021    Patient ID:  Name:  Jenn Dupont  MRN:  [id number]  1945  75 y.o.  female            CC/Reason for visit: Status post left total knee arthroplasty revision, acute hypoxic and hypercapnic respiratory failure, atelectasis, paranoia     Interval hx: Patient is angry, is paranoid.  Wants to yvrose the hospital.  Has no particular complaints today in terms of shortness of breath or chest pain.  Still on supplemental oxygen.     ROS: No abdominal pain, no chest pain, no palpitations, some soreness in her left knee    Vitals:  Vitals:    04/18/21 0742 04/18/21 0749 04/18/21 1106 04/18/21 1114   BP:  139/67     BP Location:  Left arm     Patient Position:  Lying     Pulse: 101 101  96   Resp:  20 18    Temp:  98.1 °F (36.7 °C)     TempSrc:  Oral     SpO2:  90% 96%    Weight:       Height:               Body mass index is 47.53 kg/m².    Intake/Output Summary (Last 24 hours) at 4/18/2021 1220  Last data filed at 4/18/2021 0751  Gross per 24 hour   Intake 118 ml   Output 1600 ml   Net -1482 ml       Exam:  GEN:  No distress  Alert, oriented x 2.   LUNGS: diminished breath sounds bilat, no use of accessory muscles  CV:  Normal S1S2, without murmur, ankle edema  ABD:  Non tender, orbitally obese      Scheduled meds:  allopurinol, 100 mg, Oral, Daily  aspirin, 325 mg, Oral, BID With Meals  atorvastatin, 20 mg, Oral, Daily  insulin lispro, 0-7 Units, Subcutaneous, TID AC  ipratropium-albuterol, 3 mL, Nebulization, 4x Daily - RT  metoprolol tartrate, 25 mg, Oral, Nightly  potassium chloride, 10 mEq, Oral, Daily  sodium chloride, 3 mL, Intravenous, Q12H  sodium chloride, 4 mL, Nebulization, BID - RT  triamterene-hydrochlorothiazide, 1 tablet, Oral, Daily  vilazodone, 10 mg, Oral, Daily      IV meds:                           Data Review:   I reviewed the patient's medications and new clinical results.    COVID19   Date Value Ref Range Status   04/13/2021 Not  Detected Not Detected - Ref. Range Final         Lab Results   Component Value Date    CALCIUM 9.3 04/18/2021    MG 1.6 08/23/2018     Results from last 7 days   Lab Units 04/18/21  0556 04/16/21  0656 04/15/21  0454 04/13/21  2240   SODIUM mmol/L 137 139 141 138   POTASSIUM mmol/L 3.3* 3.9 4.0 3.5   CHLORIDE mmol/L 95* 99 101 98   CO2 mmol/L 33.2* 33.8* 31.1* 30.8*   BUN mg/dL 16 19 26* 21   CREATININE mg/dL 0.44* 0.47* 0.69 0.68   CALCIUM mg/dL 9.3 9.1 8.1* 8.8   BILIRUBIN mg/dL  --   --   --  0.4   ALK PHOS U/L  --   --   --  122*   ALT (SGPT) U/L  --   --   --  60*   AST (SGOT) U/L  --   --   --  77*   GLUCOSE mg/dL 171* 133* 171* 212*   WBC 10*3/mm3 7.66 7.67 7.60 8.26   HEMOGLOBIN g/dL 13.0 13.1 12.6 13.6   PLATELETS 10*3/mm3 238 171 203 231   PROBNP pg/mL  --   --   --  125.2         ASSESSMENT:   Acute hypoxic and hypercapnic respiratory failure  Atelectasis  Morbid obesity interfering with medical care  Diabetes mellitus type 2  History of smoking  COPD  Probable sleep apnea  Status post revision arthroplasty left knee    PLAN:  Wean oxygen as tolerated.  She seems less encephalopathic today.  Needs participate more with physical therapy.  Hopefully her oxygen will be weaned off over the next day or 2.  This should not hold up her discharge.  She can be discharged anytime from our standpoint to rehab facility.        Ventura More MD  4/18/2021

## 2021-04-18 NOTE — PLAN OF CARE
Goal Outcome Evaluation:     Progress: improving  Outcome Summary: Pt had more restfull night, pt wore BIPAP for about 3 hours, SATS kept above 88 on 3L no complaints of pain, pt more lucid. will continue care.

## 2021-04-18 NOTE — PROGRESS NOTES
"DAILY PROGRESS NOTE  [facility]    Patient Identification:  Name: Jenn Dupont  Age: 75 y.o.  Sex: female  :  1945  MRN: [id number]         Primary Care Physician: Nima Diop MD    Subjective:  Interval History:She complains of pain.  She is on 2 l per nc. She is  Better.    Objective:    Scheduled Meds:allopurinol, 100 mg, Oral, Daily  aspirin, 325 mg, Oral, BID With Meals  atorvastatin, 20 mg, Oral, Daily  insulin lispro, 0-7 Units, Subcutaneous, TID AC  ipratropium-albuterol, 3 mL, Nebulization, 4x Daily - RT  metoprolol tartrate, 25 mg, Oral, Nightly  potassium chloride, 10 mEq, Oral, Daily  sodium chloride, 3 mL, Intravenous, Q12H  sodium chloride, 4 mL, Nebulization, BID - RT  triamterene-hydrochlorothiazide, 1 tablet, Oral, Daily  vilazodone, 10 mg, Oral, Daily      Continuous Infusions:     Vital signs in last 24 hours:  Temp:  [97.4 °F (36.3 °C)-98.9 °F (37.2 °C)] 98.9 °F (37.2 °C)  Heart Rate:  [] 93  Resp:  [18-20] 18  BP: (131-154)/(67-86) 131/79    Intake/Output:    Intake/Output Summary (Last 24 hours) at 2021 1610  Last data filed at 2021 0751  Gross per 24 hour   Intake 118 ml   Output 1100 ml   Net -982 ml       Exam:  /79 (BP Location: Left arm, Patient Position: Lying)   Pulse 93   Temp 98.9 °F (37.2 °C) (Oral)   Resp 18   Ht 152.4 cm (60\")   Wt 110 kg (243 lb 6.2 oz)   SpO2 95%   BMI 47.53 kg/m²     General Appearance:    Alert, cooperative, no distress   Head:    Normocephalic, without obvious abnormality, atraumatic   Eyes:       Throat:   Lips, tongue, gums normal   Neck:   Supple, symmetrical, trachea midline, no JVD   Lungs:     Clear to auscultation bilaterally, respirations unlabored   Chest Wall:    No tenderness or deformity    Heart:    Regular rate and rhythm, S1 and S2 normal, no murmur,no  Rub or gallop   Abdomen:     Soft, nontender, bowel sounds active, no masses, no organomegaly    Extremities:   Extremities normal, " left knee surgical changes, no cyanosis or edema   Pulses:      Skin:   Skin is warm and dry,  no rashes or palpable lesions   Neurologic:   no focal deficits noted      Lab Results (last 72 hours)     Procedure Component Value Units Date/Time    POC Glucose Once [[id number]]  (Abnormal) Collected: 04/14/21 1032    Specimen: Blood Updated: 04/14/21 1037     Glucose 292 mg/dL     Basic Metabolic Panel [[id number]]  (Abnormal) Collected: 04/14/21 0640    Specimen: Blood Updated: 04/14/21 0745     Glucose 255 mg/dL      BUN 19 mg/dL      Creatinine 0.71 mg/dL      Sodium 141 mmol/L      Potassium 4.3 mmol/L      Chloride 101 mmol/L      CO2 31.5 mmol/L      Calcium 9.0 mg/dL      eGFR  African Amer 98 mL/min/1.73      BUN/Creatinine Ratio 26.8     Anion Gap 8.5 mmol/L     Narrative:      GFR Normal >60  Chronic Kidney Disease <60  Kidney Failure <15      CBC (No Diff) [[id number]]  (Abnormal) Collected: 04/14/21 0640    Specimen: Blood Updated: 04/14/21 0711     WBC 10.48 10*3/mm3      RBC 5.05 10*6/mm3      Hemoglobin 14.0 g/dL      Hematocrit 45.4 %      MCV 89.9 fL      MCH 27.7 pg      MCHC 30.8 g/dL      RDW 15.2 %      RDW-SD 50.2 fl      MPV 10.5 fL      Platelets 214 10*3/mm3     POC Glucose Once [[id number]]  (Abnormal) Collected: 04/14/21 0649    Specimen: Blood Updated: 04/14/21 0652     Glucose 261 mg/dL     Blood Gas, Arterial - [[id number]]  (Abnormal) Collected: 04/13/21 2332    Specimen: Arterial Blood Updated: 04/13/21 2337     Site Arterial: right radial     Moy's Test Positive     pH, Arterial 7.327 pH units      pCO2, Arterial 57.8 mm Hg      Comment: Critical:Notify RAMIREZ GONZALEZ (13-Apr-21 23:35:44)Read back ok        pO2, Arterial 87.2 mm Hg      HCO3, Arterial 30.2 mmol/L      Base Excess, Arterial 2.6 mmol/L      O2 Saturation Calculated 95.6 %      Barometric Pressure for Blood Gas 751.8 mmHg      Modality HFNC     Flow Rate 6 lpm      Rate 18 Breaths/minute     BNP [[id number]]  (Normal)  Collected: 04/13/21 2240    Specimen: Blood Updated: 04/13/21 2330     proBNP 125.2 pg/mL     Narrative:      Among patients with dyspnea, NT-proBNP is highly sensitive for the detection of acute congestive heart failure. In addition NT-proBNP of <300 pg/ml effectively rules out acute congestive heart failure with 99% negative predictive value.    Results may be falsely decreased if patient taking Biotin.      Troponin [[id number]]  (Normal) Collected: 04/13/21 2240    Specimen: Blood Updated: 04/13/21 2330     Troponin T <0.010 ng/mL     Narrative:      Troponin T Reference Range:  <= 0.03 ng/mL-   Negative for AMI  >0.03 ng/mL-     Abnormal for myocardial necrosis.  Clinicians would have to utilize clinical acumen, EKG, Troponin and serial changes to determine if it is an Acute Myocardial Infarction or myocardial injury due to an underlying chronic condition.       Results may be falsely decreased if patient taking Biotin.      Comprehensive Metabolic Panel [[id number]]  (Abnormal) Collected: 04/13/21 2240    Specimen: Blood Updated: 04/13/21 2325     Glucose 212 mg/dL      BUN 21 mg/dL      Creatinine 0.68 mg/dL      Sodium 138 mmol/L      Potassium 3.5 mmol/L      Chloride 98 mmol/L      CO2 30.8 mmol/L      Calcium 8.8 mg/dL      Total Protein 6.7 g/dL      Albumin 3.50 g/dL      ALT (SGPT) 60 U/L      AST (SGOT) 77 U/L      Alkaline Phosphatase 122 U/L      Total Bilirubin 0.4 mg/dL      eGFR  African Amer 103 mL/min/1.73      Globulin 3.2 gm/dL      A/G Ratio 1.1 g/dL      BUN/Creatinine Ratio 30.9     Anion Gap 9.2 mmol/L     Narrative:      GFR Normal >60  Chronic Kidney Disease <60  Kidney Failure <15      CBC & Differential [[id number]]  (Abnormal) Collected: 04/13/21 2240    Specimen: Blood Updated: 04/13/21 2320    Narrative:      The following orders were created for panel order CBC & Differential.  Procedure                               Abnormality         Status                     ---------                                -----------         ------                     CBC Auto Differential[[id number]]        Abnormal            Final result                 Please view results for these tests on the individual orders.    CBC Auto Differential [[id number]]  (Abnormal) Collected: 04/13/21 2240    Specimen: Blood Updated: 04/13/21 2320     WBC 8.26 10*3/mm3      RBC 5.09 10*6/mm3      Hemoglobin 13.6 g/dL      Hematocrit 43.1 %      MCV 84.7 fL      MCH 26.7 pg      MCHC 31.6 g/dL      RDW 14.9 %      RDW-SD 45.3 fl      MPV 10.6 fL      Platelets 231 10*3/mm3      Neutrophil % 93.3 %      Lymphocyte % 4.1 %      Monocyte % 2.1 %      Eosinophil % 0.0 %      Basophil % 0.1 %      Immature Grans % 0.4 %      Neutrophils, Absolute 7.71 10*3/mm3      Lymphocytes, Absolute 0.34 10*3/mm3      Monocytes, Absolute 0.17 10*3/mm3      Eosinophils, Absolute 0.00 10*3/mm3      Basophils, Absolute 0.01 10*3/mm3      Immature Grans, Absolute 0.03 10*3/mm3      nRBC 0.0 /100 WBC     POC Glucose Once [[id number]]  (Abnormal) Collected: 04/13/21 2114    Specimen: Blood Updated: 04/13/21 2135     Glucose 207 mg/dL     POC Glucose Once [[id number]]  (Abnormal) Collected: 04/13/21 1643    Specimen: Blood Updated: 04/13/21 1649     Glucose 205 mg/dL     COVID PRE-OP / PRE-PROCEDURE SCREENING ORDER (NO ISOLATION) - Swab, Nasopharynx [[id number]]  (Normal) Collected: 04/13/21 0939    Specimen: Swab from Nasopharynx Updated: 04/13/21 1038    Narrative:      The following orders were created for panel order COVID PRE-OP / PRE-PROCEDURE SCREENING ORDER (NO ISOLATION) - Swab, Nasopharynx.  Procedure                               Abnormality         Status                     ---------                               -----------         ------                     COVID-19,[facility] IN-[facility]SE...[[id number]]  Normal              Final result                 Please view results for these tests on the individual orders.    COVID-19,[facility] IN-HOUSE [facility]  SWAB IN TRANSPORT MEDIA 8-12 HR TAT - Swab, Nasopharynx [[id number]]  (Normal) Collected: 04/13/21 0939    Specimen: Swab from Nasopharynx Updated: 04/13/21 1038     COVID19 Not Detected    Narrative:      Fact sheet for providers: https://www.fda.gov/media/152711/download     Fact sheet for patients: https://www.fda.gov/media/202783/download    POC Glucose Once [[id number]]  (Abnormal) Collected: 04/13/21 1024    Specimen: Blood Updated: 04/13/21 1027     Glucose 132 mg/dL     SCANNED - LABS [[id number]] Resulted: 04/13/21     Updated: 04/12/21 1545        Data Review:  Results from last 7 days   Lab Units 04/18/21  0556 04/16/21  0656 04/15/21  0454   SODIUM mmol/L 137 139 141   POTASSIUM mmol/L 3.3* 3.9 4.0   CHLORIDE mmol/L 95* 99 101   CO2 mmol/L 33.2* 33.8* 31.1*   BUN mg/dL 16 19 26*   CREATININE mg/dL 0.44* 0.47* 0.69   GLUCOSE mg/dL 171* 133* 171*   CALCIUM mg/dL 9.3 9.1 8.1*     Results from last 7 days   Lab Units 04/18/21  0556 04/16/21  0656 04/15/21  0454   WBC 10*3/mm3 7.66 7.67 7.60   HEMOGLOBIN g/dL 13.0 13.1 12.6   HEMATOCRIT % 40.9 43.0 40.4   PLATELETS 10*3/mm3 238 171 203             Lab Results   Lab Value Date/Time    TROPONINT <0.010 04/13/2021 2240         Results from last 7 days   Lab Units 04/13/21  2240   ALK PHOS U/L 122*   BILIRUBIN mg/dL 0.4   ALT (SGPT) U/L 60*   AST (SGOT) U/L 77*             Glucose   Date/Time Value Ref Range Status   04/18/2021 1055 143 (H) 70 - 130 mg/dL Final   04/18/2021 0658 153 (H) 70 - 130 mg/dL Final   04/17/2021 2114 202 (H) 70 - 130 mg/dL Final   04/17/2021 1607 149 (H) 70 - 130 mg/dL Final   04/17/2021 1109 150 (H) 70 - 130 mg/dL Final   04/17/2021 0649 160 (H) 70 - 130 mg/dL Final   04/16/2021 2028 168 (H) 70 - 130 mg/dL Final   04/16/2021 1615 163 (H) 70 - 130 mg/dL Final           Past Medical History:   Diagnosis Date   • Asthma     ALLERGY INDUCED    • Coronary artery disease    • Diabetes mellitus (CMS/HCC)    • Elevated cholesterol    • Hypertension     • Knee pain, left    • Low back pain    • MI (myocardial infarction) (CMS/[facility]) 2013   • Pulmonary embolism (CMS/[facility])     2009   • Shoulder pain, left    • Urinary frequency        Assessment:  Active Hospital Problems    Diagnosis  POA   • **Aseptic loosening of prosthetic knee (CMS/[facility]) [T84.038A, Z96.659]  Not Applicable   • Acute on chronic respiratory failure with hypoxia and hypercapnia (CMS/[facility]) [J96.21, J96.22]  Unknown   • Coronary artery disease [I25.10]  Unknown   • Diabetes mellitus (CMS/[facility]) [E11.9]  Unknown   • Asthma [J45.909]  Unknown   • Hypertension [I10]  Unknown   • DJD (degenerative joint disease) [M19.90]  Yes      Resolved Hospital Problems   No resolved problems to display.       Plan:  Continue current RX. Will follow.  Sliding scale insulin.  Will need SNU for rehab. Looks stable for rehab.  As per pulmonary.  Booker Acosta MD  4/18/2021  16:10 EDT

## 2021-04-19 VITALS
RESPIRATION RATE: 18 BRPM | SYSTOLIC BLOOD PRESSURE: 113 MMHG | HEIGHT: 60 IN | OXYGEN SATURATION: 98 % | HEART RATE: 91 BPM | DIASTOLIC BLOOD PRESSURE: 84 MMHG | WEIGHT: 243.39 LBS | BODY MASS INDEX: 47.78 KG/M2 | TEMPERATURE: 98.3 F

## 2021-04-19 LAB
ANION GAP SERPL CALCULATED.3IONS-SCNC: 12.6 MMOL/L (ref 5–15)
BASOPHILS # BLD AUTO: 0.02 10*3/MM3 (ref 0–0.2)
BASOPHILS NFR BLD AUTO: 0.3 % (ref 0–1.5)
BUN SERPL-MCNC: 21 MG/DL (ref 8–23)
BUN/CREAT SERPL: 31.8 (ref 7–25)
CALCIUM SPEC-SCNC: 9.4 MG/DL (ref 8.6–10.5)
CHLORIDE SERPL-SCNC: 96 MMOL/L (ref 98–107)
CO2 SERPL-SCNC: 30.4 MMOL/L (ref 22–29)
CREAT SERPL-MCNC: 0.66 MG/DL (ref 0.57–1)
DEPRECATED RDW RBC AUTO: 45.7 FL (ref 37–54)
EOSINOPHIL # BLD AUTO: 0.17 10*3/MM3 (ref 0–0.4)
EOSINOPHIL NFR BLD AUTO: 2.5 % (ref 0.3–6.2)
ERYTHROCYTE [DISTWIDTH] IN BLOOD BY AUTOMATED COUNT: 14.9 % (ref 12.3–15.4)
GFR SERPL CREATININE-BSD FRML MDRD: 106 ML/MIN/1.73
GLUCOSE BLDC GLUCOMTR-MCNC: 158 MG/DL (ref 70–130)
GLUCOSE SERPL-MCNC: 181 MG/DL (ref 65–99)
HCT VFR BLD AUTO: 43.1 % (ref 34–46.6)
HGB BLD-MCNC: 13.6 G/DL (ref 12–15.9)
IMM GRANULOCYTES # BLD AUTO: 0.03 10*3/MM3 (ref 0–0.05)
IMM GRANULOCYTES NFR BLD AUTO: 0.4 % (ref 0–0.5)
LYMPHOCYTES # BLD AUTO: 0.93 10*3/MM3 (ref 0.7–3.1)
LYMPHOCYTES NFR BLD AUTO: 13.4 % (ref 19.6–45.3)
MCH RBC QN AUTO: 27 PG (ref 26.6–33)
MCHC RBC AUTO-ENTMCNC: 31.6 G/DL (ref 31.5–35.7)
MCV RBC AUTO: 85.5 FL (ref 79–97)
MONOCYTES # BLD AUTO: 0.75 10*3/MM3 (ref 0.1–0.9)
MONOCYTES NFR BLD AUTO: 10.8 % (ref 5–12)
NEUTROPHILS NFR BLD AUTO: 5.02 10*3/MM3 (ref 1.7–7)
NEUTROPHILS NFR BLD AUTO: 72.6 % (ref 42.7–76)
NRBC BLD AUTO-RTO: 0 /100 WBC (ref 0–0.2)
PLATELET # BLD AUTO: 269 10*3/MM3 (ref 140–450)
PMV BLD AUTO: 11.3 FL (ref 6–12)
POTASSIUM SERPL-SCNC: 3.9 MMOL/L (ref 3.5–5.2)
RBC # BLD AUTO: 5.04 10*6/MM3 (ref 3.77–5.28)
SODIUM SERPL-SCNC: 139 MMOL/L (ref 136–145)
WBC # BLD AUTO: 6.92 10*3/MM3 (ref 3.4–10.8)

## 2021-04-19 PROCEDURE — 80048 BASIC METABOLIC PNL TOTAL CA: CPT | Performed by: HOSPITALIST

## 2021-04-19 PROCEDURE — 94799 UNLISTED PULMONARY SVC/PX: CPT

## 2021-04-19 PROCEDURE — 85025 COMPLETE CBC W/AUTO DIFF WBC: CPT | Performed by: HOSPITALIST

## 2021-04-19 PROCEDURE — 63710000001 INSULIN LISPRO (HUMAN) PER 5 UNITS: Performed by: HOSPITALIST

## 2021-04-19 PROCEDURE — 82962 GLUCOSE BLOOD TEST: CPT

## 2021-04-19 RX ORDER — OXYCODONE HYDROCHLORIDE AND ACETAMINOPHEN 5; 325 MG/1; MG/1
1-2 TABLET ORAL EVERY 4 HOURS PRN
Qty: 60 TABLET | Refills: 0 | Status: SHIPPED | OUTPATIENT
Start: 2021-04-19

## 2021-04-19 RX ORDER — ASPIRIN 325 MG
325 TABLET, DELAYED RELEASE (ENTERIC COATED) ORAL 2 TIMES DAILY WITH MEALS
Qty: 60 TABLET | Refills: 0 | Status: SHIPPED | OUTPATIENT
Start: 2021-04-19

## 2021-04-19 RX ORDER — PSEUDOEPHEDRINE HCL 30 MG
100 TABLET ORAL 2 TIMES DAILY PRN
Qty: 30 CAPSULE | Refills: 1 | Status: SHIPPED | OUTPATIENT
Start: 2021-04-19

## 2021-04-19 RX ADMIN — VILAZODONE HYDROCHLORIDE 10 MG: 10 TABLET ORAL at 08:16

## 2021-04-19 RX ADMIN — SODIUM CHLORIDE, PRESERVATIVE FREE 3 ML: 5 INJECTION INTRAVENOUS at 08:16

## 2021-04-19 RX ADMIN — IPRATROPIUM BROMIDE AND ALBUTEROL SULFATE 3 ML: 2.5; .5 SOLUTION RESPIRATORY (INHALATION) at 07:22

## 2021-04-19 RX ADMIN — ATORVASTATIN CALCIUM 20 MG: 20 TABLET, FILM COATED ORAL at 08:15

## 2021-04-19 RX ADMIN — TRIAMTERENE AND HYDROCHLOROTHIAZIDE 1 TABLET: 75; 50 TABLET ORAL at 08:16

## 2021-04-19 RX ADMIN — Medication 4 ML: at 07:29

## 2021-04-19 RX ADMIN — POTASSIUM CHLORIDE 10 MEQ: 750 TABLET, EXTENDED RELEASE ORAL at 08:15

## 2021-04-19 RX ADMIN — OXYCODONE HYDROCHLORIDE AND ACETAMINOPHEN 1 TABLET: 5; 325 TABLET ORAL at 08:15

## 2021-04-19 RX ADMIN — ALLOPURINOL 100 MG: 100 TABLET ORAL at 08:15

## 2021-04-19 RX ADMIN — ASPIRIN 325 MG: 325 TABLET, COATED ORAL at 08:15

## 2021-04-19 NOTE — PROGRESS NOTES
"Physicians Statement of Medical Necessity for  Ambulance Transportation    GENERAL INFORMATION     Name: Jenn Dupont  YOB: 1945  Medicare #: [id number]  Transport Date: 4/19/2021   (  Origin: [facility]  Destination: [facility]   Is the Patient's stay covered under Medicare Part A (PPS/DRG?)No   Closest appropriate facility? Yes  If this a hosp-hosp transfer? No  Is this a hospice patient? No    MEDICAL NECESSITY QUESTIONAIRE    Ambulance Transportation is medically necessary only if other means of transportation are contraindicated or would be potentially harmful to the patient.  To meet this requirement, the patient must be either \"bed confined\" or suffer from a condition such that transport by means other than an ambulance is contraindicated by the patient's condition.  The following questions must be answered by the healthcare professional signing below for this form to be valid:     1) Describe the MEDICAL CONDITION (physical and/or mental) of this patient AT THE TIME OF AMBULANCE TRANSPORT that requires the patient to be transported in an ambulance, and why transport by other means is contraindicated by the patient's condition:   Active Hospital Problems    Diagnosis    • **Aseptic loosening of prosthetic knee (CMS/HCC)    • Acute on chronic respiratory failure with hypoxia and hypercapnia (CMS/HCC)    • Coronary artery disease    • Diabetes mellitus (CMS/HCC)    • Asthma      ALLERGY INDUCED      • Hypertension    • DJD (degenerative joint disease)        Past Medical History:   Diagnosis Date   • Asthma     ALLERGY INDUCED    • Coronary artery disease    • Diabetes mellitus (CMS/HCC)    • Elevated cholesterol    • Hypertension    • Knee pain, left    • Low back pain    • MI (myocardial infarction) (CMS/HCC) 2013   • Pulmonary embolism (CMS/HCC)     2009   • Shoulder pain, left    • Urinary frequency       Past Surgical History:   Procedure Laterality Date   • CHOLECYSTECTOMY     • " "CORONARY ANGIOPLASTY WITH STENT PLACEMENT  2013   • HYSTERECTOMY     • TONSILLECTOMY     • TOTAL KNEE ARTHROPLASTY Left 2015   • TOTAL KNEE ARTHROPLASTY Right 2015      2) Is this patient \"bed confined\" as defined below?Yes   To be \"bed confined\" the patient must satisfy all three of the following criteria:  (1) unable to get up from bed without assistance; AND (2) unable to ambulate;  AND (3) unable to sit in a chair or wheelchair.  3) Can this patient safely be transported by car or wheelchair van (I.e., may safely sit during transport, without an attendant or monitoring?)No   4. In addition to completing questions 1-3 above, please check any of the following conditions that apply*:          *Note: supporting documentation for any boxes checked must be maintained in the patient's medical records Requires oxygen - unable to self administer      SIGNATURE OF PHYSICIAN OR OTHER AUTHORIZED HEALTHCARE PROFESSIONAL    I certify that the above information is true and correct based on my evaluation of this patient, and represent that the patient requires transport by ambulance and that other forms of transport are contraindicated.  I understand that this information will be used by the Centers for Medicare and Medicaid Services (CMS) to support the determiniation of medical necessity for ambulance services, and I represent that I have personal knowledge of the patient's condition at the time of transport.       If this box is checked, I also certify that the patient is physically or mentally incapable of signing the ambulance service's claim form and that the institution with which I am affiliated has furnished care, services or assistance to the patient.  My signature below is made on behalf of the patient pursuant to 42 .36(b)(4). In accordance with 42 .37, the specific reason(s) that the patient is physically or mentally incapable of signing the claim for is as follows:     Signature of Physician or " Healthcare Professional  Rimma Leavitt RN    4/19/2021  11:03 EDT       (For Scheduled repetitive transport, this form is not valid for transports performed more than 60 days after this date).                                                                                                                                            --------------------------------------------------------------------------------------------  Printed Name and Credentials of Physician or Authorized Healthcare Professional     *Form must be signed by patient's attending physician for scheduled, repetitive transports,.  For non-repetitive ambulance transports, if unable to obtain the signature of the attending physician, any of the following may sign (please select below):     Physician  Clinical Nurse Specialist  Registered Nurse     Physician Assistant  Discharge Planner  Licensed Practical Nurse     Nurse Practitioner

## 2021-04-19 NOTE — PROGRESS NOTES
"DAILY PROGRESS NOTE  [facility]    Patient Identification:  Name: Jenn Dupont  Age: 75 y.o.  Sex: female  :  1945  MRN: [id number]         Primary Care Physician: Nima Diop MD    Subjective:  Interval History:She complains of pain.  She is on 2 l per nc. She is  Better.    Objective:    Scheduled Meds:allopurinol, 100 mg, Oral, Daily  aspirin, 325 mg, Oral, BID With Meals  atorvastatin, 20 mg, Oral, Daily  insulin lispro, 0-7 Units, Subcutaneous, TID AC  ipratropium-albuterol, 3 mL, Nebulization, 4x Daily - RT  metoprolol tartrate, 25 mg, Oral, Nightly  potassium chloride, 10 mEq, Oral, Daily  sodium chloride, 3 mL, Intravenous, Q12H  sodium chloride, 4 mL, Nebulization, BID - RT  triamterene-hydrochlorothiazide, 1 tablet, Oral, Daily  vilazodone, 10 mg, Oral, Daily      Continuous Infusions:     Vital signs in last 24 hours:  Temp:  [97.6 °F (36.4 °C)-98.9 °F (37.2 °C)] 98.3 °F (36.8 °C)  Heart Rate:  [] 91  Resp:  [18-20] 18  BP: (113-133)/(64-84) 113/84    Intake/Output:    Intake/Output Summary (Last 24 hours) at 2021 1027  Last data filed at 2021 0529  Gross per 24 hour   Intake 120 ml   Output 1550 ml   Net -1430 ml       Exam:  /84 (BP Location: Left arm, Patient Position: Lying)   Pulse 91   Temp 98.3 °F (36.8 °C) (Oral)   Resp 18   Ht 152.4 cm (60\")   Wt 110 kg (243 lb 6.2 oz)   SpO2 98%   BMI 47.53 kg/m²     General Appearance:    Alert, cooperative, no distress   Head:    Normocephalic, without obvious abnormality, atraumatic   Eyes:       Throat:   Lips, tongue, gums normal   Neck:   Supple, symmetrical, trachea midline, no JVD   Lungs:     Clear to auscultation bilaterally, respirations unlabored   Chest Wall:    No tenderness or deformity    Heart:    Regular rate and rhythm, S1 and S2 normal, no murmur,no  Rub or gallop   Abdomen:     Soft, nontender, bowel sounds active, no masses, no organomegaly    Extremities:   Extremities normal, " left knee surgical changes, no cyanosis or edema   Pulses:      Skin:   Skin is warm and dry,  no rashes or palpable lesions   Neurologic:   no focal deficits noted      Lab Results (last 72 hours)     Procedure Component Value Units Date/Time    POC Glucose Once [[id number]]  (Abnormal) Collected: 04/14/21 1032    Specimen: Blood Updated: 04/14/21 1037     Glucose 292 mg/dL     Basic Metabolic Panel [[id number]]  (Abnormal) Collected: 04/14/21 0640    Specimen: Blood Updated: 04/14/21 0745     Glucose 255 mg/dL      BUN 19 mg/dL      Creatinine 0.71 mg/dL      Sodium 141 mmol/L      Potassium 4.3 mmol/L      Chloride 101 mmol/L      CO2 31.5 mmol/L      Calcium 9.0 mg/dL      eGFR  African Amer 98 mL/min/1.73      BUN/Creatinine Ratio 26.8     Anion Gap 8.5 mmol/L     Narrative:      GFR Normal >60  Chronic Kidney Disease <60  Kidney Failure <15      CBC (No Diff) [[id number]]  (Abnormal) Collected: 04/14/21 0640    Specimen: Blood Updated: 04/14/21 0711     WBC 10.48 10*3/mm3      RBC 5.05 10*6/mm3      Hemoglobin 14.0 g/dL      Hematocrit 45.4 %      MCV 89.9 fL      MCH 27.7 pg      MCHC 30.8 g/dL      RDW 15.2 %      RDW-SD 50.2 fl      MPV 10.5 fL      Platelets 214 10*3/mm3     POC Glucose Once [[id number]]  (Abnormal) Collected: 04/14/21 0649    Specimen: Blood Updated: 04/14/21 0652     Glucose 261 mg/dL     Blood Gas, Arterial - [[id number]]  (Abnormal) Collected: 04/13/21 2332    Specimen: Arterial Blood Updated: 04/13/21 2337     Site Arterial: right radial     Moy's Test Positive     pH, Arterial 7.327 pH units      pCO2, Arterial 57.8 mm Hg      Comment: Critical:Notify RAMIREZ GONZALEZ (13-Apr-21 23:35:44)Read back ok        pO2, Arterial 87.2 mm Hg      HCO3, Arterial 30.2 mmol/L      Base Excess, Arterial 2.6 mmol/L      O2 Saturation Calculated 95.6 %      Barometric Pressure for Blood Gas 751.8 mmHg      Modality HFNC     Flow Rate 6 lpm      Rate 18 Breaths/minute     BNP [[id number]]  (Normal)  Collected: 04/13/21 2240    Specimen: Blood Updated: 04/13/21 2330     proBNP 125.2 pg/mL     Narrative:      Among patients with dyspnea, NT-proBNP is highly sensitive for the detection of acute congestive heart failure. In addition NT-proBNP of <300 pg/ml effectively rules out acute congestive heart failure with 99% negative predictive value.    Results may be falsely decreased if patient taking Biotin.      Troponin [[id number]]  (Normal) Collected: 04/13/21 2240    Specimen: Blood Updated: 04/13/21 2330     Troponin T <0.010 ng/mL     Narrative:      Troponin T Reference Range:  <= 0.03 ng/mL-   Negative for AMI  >0.03 ng/mL-     Abnormal for myocardial necrosis.  Clinicians would have to utilize clinical acumen, EKG, Troponin and serial changes to determine if it is an Acute Myocardial Infarction or myocardial injury due to an underlying chronic condition.       Results may be falsely decreased if patient taking Biotin.      Comprehensive Metabolic Panel [[id number]]  (Abnormal) Collected: 04/13/21 2240    Specimen: Blood Updated: 04/13/21 2325     Glucose 212 mg/dL      BUN 21 mg/dL      Creatinine 0.68 mg/dL      Sodium 138 mmol/L      Potassium 3.5 mmol/L      Chloride 98 mmol/L      CO2 30.8 mmol/L      Calcium 8.8 mg/dL      Total Protein 6.7 g/dL      Albumin 3.50 g/dL      ALT (SGPT) 60 U/L      AST (SGOT) 77 U/L      Alkaline Phosphatase 122 U/L      Total Bilirubin 0.4 mg/dL      eGFR  African Amer 103 mL/min/1.73      Globulin 3.2 gm/dL      A/G Ratio 1.1 g/dL      BUN/Creatinine Ratio 30.9     Anion Gap 9.2 mmol/L     Narrative:      GFR Normal >60  Chronic Kidney Disease <60  Kidney Failure <15      CBC & Differential [[id number]]  (Abnormal) Collected: 04/13/21 2240    Specimen: Blood Updated: 04/13/21 2320    Narrative:      The following orders were created for panel order CBC & Differential.  Procedure                               Abnormality         Status                     ---------                                -----------         ------                     CBC Auto Differential[[id number]]        Abnormal            Final result                 Please view results for these tests on the individual orders.    CBC Auto Differential [[id number]]  (Abnormal) Collected: 04/13/21 2240    Specimen: Blood Updated: 04/13/21 2320     WBC 8.26 10*3/mm3      RBC 5.09 10*6/mm3      Hemoglobin 13.6 g/dL      Hematocrit 43.1 %      MCV 84.7 fL      MCH 26.7 pg      MCHC 31.6 g/dL      RDW 14.9 %      RDW-SD 45.3 fl      MPV 10.6 fL      Platelets 231 10*3/mm3      Neutrophil % 93.3 %      Lymphocyte % 4.1 %      Monocyte % 2.1 %      Eosinophil % 0.0 %      Basophil % 0.1 %      Immature Grans % 0.4 %      Neutrophils, Absolute 7.71 10*3/mm3      Lymphocytes, Absolute 0.34 10*3/mm3      Monocytes, Absolute 0.17 10*3/mm3      Eosinophils, Absolute 0.00 10*3/mm3      Basophils, Absolute 0.01 10*3/mm3      Immature Grans, Absolute 0.03 10*3/mm3      nRBC 0.0 /100 WBC     POC Glucose Once [[id number]]  (Abnormal) Collected: 04/13/21 2114    Specimen: Blood Updated: 04/13/21 2135     Glucose 207 mg/dL     POC Glucose Once [[id number]]  (Abnormal) Collected: 04/13/21 1643    Specimen: Blood Updated: 04/13/21 1649     Glucose 205 mg/dL     COVID PRE-OP / PRE-PROCEDURE SCREENING ORDER (NO ISOLATION) - Swab, Nasopharynx [[id number]]  (Normal) Collected: 04/13/21 0939    Specimen: Swab from Nasopharynx Updated: 04/13/21 1038    Narrative:      The following orders were created for panel order COVID PRE-OP / PRE-PROCEDURE SCREENING ORDER (NO ISOLATION) - Swab, Nasopharynx.  Procedure                               Abnormality         Status                     ---------                               -----------         ------                     COVID-19,[facility] IN-HOUSE...[[id number]]  Normal              Final result                 Please view results for these tests on the individual orders.    COVID-19,[facility] IN-HOUSE [facility]ONA [facility]ARKER  SWAB IN TRANSPORT MEDIA 8-12 HR TAT - Swab, Nasopharynx [[id number]]  (Normal) Collected: 04/13/21 0939    Specimen: Swab from Nasopharynx Updated: 04/13/21 1038     COVID19 Not Detected    Narrative:      Fact sheet for providers: https://www.fda.gov/media/499271/download     Fact sheet for patients: https://www.fda.gov/media/417449/download    POC Glucose Once [[id number]]  (Abnormal) Collected: 04/13/21 1024    Specimen: Blood Updated: 04/13/21 1027     Glucose 132 mg/dL     SCANNED - LABS [[id number]] Resulted: 04/13/21     Updated: 04/12/21 1545        Data Review:  Results from last 7 days   Lab Units 04/18/21  0556 04/16/21  0656 04/15/21  0454   SODIUM mmol/L 137 139 141   POTASSIUM mmol/L 3.3* 3.9 4.0   CHLORIDE mmol/L 95* 99 101   CO2 mmol/L 33.2* 33.8* 31.1*   BUN mg/dL 16 19 26*   CREATININE mg/dL 0.44* 0.47* 0.69   GLUCOSE mg/dL 171* 133* 171*   CALCIUM mg/dL 9.3 9.1 8.1*     Results from last 7 days   Lab Units 04/18/21  0556 04/16/21  0656 04/15/21  0454   WBC 10*3/mm3 7.66 7.67 7.60   HEMOGLOBIN g/dL 13.0 13.1 12.6   HEMATOCRIT % 40.9 43.0 40.4   PLATELETS 10*3/mm3 238 171 203             Lab Results   Lab Value Date/Time    TROPONINT <0.010 04/13/2021 2240         Results from last 7 days   Lab Units 04/13/21  2240   ALK PHOS U/L 122*   BILIRUBIN mg/dL 0.4   ALT (SGPT) U/L 60*   AST (SGOT) U/L 77*             Glucose   Date/Time Value Ref Range Status   04/19/2021 0652 158 (H) 70 - 130 mg/dL Final   04/18/2021 2144 160 (H) 70 - 130 mg/dL Final   04/18/2021 1614 155 (H) 70 - 130 mg/dL Final   04/18/2021 1055 143 (H) 70 - 130 mg/dL Final   04/18/2021 0658 153 (H) 70 - 130 mg/dL Final   04/17/2021 2114 202 (H) 70 - 130 mg/dL Final   04/17/2021 1607 149 (H) 70 - 130 mg/dL Final   04/17/2021 1109 150 (H) 70 - 130 mg/dL Final           Past Medical History:   Diagnosis Date   • Asthma     ALLERGY INDUCED    • Coronary artery disease    • Diabetes mellitus (CMS/HCC)    • Elevated cholesterol    • Hypertension     • Knee pain, left    • Low back pain    • MI (myocardial infarction) (CMS/[facility]) 2013   • Pulmonary embolism (CMS/[facility])     2009   • Shoulder pain, left    • Urinary frequency        Assessment:  Active Hospital Problems    Diagnosis  POA   • **Aseptic loosening of prosthetic knee (CMS/[facility]) [T84.038A, Z96.659]  Not Applicable   • Acute on chronic respiratory failure with hypoxia and hypercapnia (CMS/[facility]) [J96.21, J96.22]  Unknown   • Coronary artery disease [I25.10]  Unknown   • Diabetes mellitus (CMS/[facility]) [E11.9]  Unknown   • Asthma [J45.909]  Unknown   • Hypertension [I10]  Unknown   • DJD (degenerative joint disease) [M19.90]  Yes      Resolved Hospital Problems   No resolved problems to display.       Plan:  Continue current RX. Will follow.  Sliding scale insulin.  Will need SNU for rehab. Looks stable for rehab today.  As per pulmonary.  Booker Acosta MD  4/19/2021  10:27 EDT

## 2021-04-19 NOTE — CASE MANAGEMENT/SOCIAL WORK
Discharge Planning Assessment  [facility]     Patient Name: Jenn Dupont  MRN: [id number]  Today's Date: 4/19/2021    Admit Date: 4/13/2021    Discharge Needs Assessment    No documentation.       Discharge Plan     Row Name 04/19/21 0947       Plan    Plan  skilled rehab today at [facility]    Final Discharge Disposition Code  03 - skilled nursing facility (SNF)    Final Note  has discharge orders today and will go to [facility] in [place] via [facility] at 11am. Call placed to family member Jose Angel Paz [phone] he is agreeable with this discharge plan. Carlo GUZMÁN        Continued Care and Services - Admitted Since 4/13/2021     Destination Coordination complete    Service Provider Request Status Selected Services Address Phone Fax Patient Preferred    [facility] [address] [place] [address] [phone] --              Expected Discharge Date and Time     Expected Discharge Date Expected Discharge Time    Apr 19, 2021         Demographic Summary    No documentation.       Functional Status    No documentation.       Psychosocial    No documentation.       Abuse/Neglect    No documentation.       Legal    No documentation.       Substance Abuse    No documentation.       Patient Forms    No documentation.           Rimma Leavitt, RN

## 2021-04-19 NOTE — DISCHARGE SUMMARY
Orthopaedic Surgery Discharge Summary    Patient Name:  Jenn Dupont  YOB: 1945  Age: 75 y.o.  Medical Records Number:  [id number]    Date of Admission:  4/13/2021  Date of Discharge:  4/19/2021    Primary Discharge Diagnosis:  Aseptic loosening of prosthetic knee (CMS/[facility]) [T84.038A, Z96.659]  DJD (degenerative joint disease) [M19.90]  Mechanical loosening of prosthetic knee, initial encounter (CMS/[facility]) [T84.038A, Z96.659]    Secondary Discharge Diagnosis:    Problems Addressed this Visit        Musculoskeletal and Injuries    * (Principal) Aseptic loosening of prosthetic knee (CMS/[facility]) - Primary    Relevant Medications    oxyCODONE-acetaminophen (PERCOCET) 5-325 MG per tablet    oxyCODONE-acetaminophen (PERCOCET) 5-325 MG per tablet      Diagnoses       Codes Comments    Mechanical loosening of prosthetic knee, initial encounter (CMS/[facility])    -  Primary ICD-10-CM: T84.038A, Z96.659  ICD-9-CM: 996.41, V43.65           Procedures Performed:  Left Total Knee Arthroplasty Revision      Hospital Course:    Jenn Dupont is a 75 y.o.  female who underwent successful left tka revision on 4/13/2021.  Jenn Dupont was started on Aspirin 325 mg po twice daily post-operatively for DVT prophylaxis.  On post-op day 1 the patients dressing was changed and their incision was clean, with no signs of infection and their calf was soft, with no signs of DVT.  Her oxygen saturation and levels dropped on pod 2 and she was transferred to telemetry unit where she was treated supportively by Pulmonology and IM.  The patient progressed well with physical therapy and the patients hemoglobin remained stable. On post-operative day 6 her oxygen levels stabilized and the patient was felt ready for discharge.     Vitals:  Vitals:    04/19/21 0713 04/19/21 0722 04/19/21 0729 04/19/21 0739   BP: 113/84      BP Location: Left arm      Patient Position: Lying      Pulse: 88 95 90 91   Resp: 20 18 18 18   Temp: 98.3  °F (36.8 °C)      TempSrc: Oral      SpO2: 93% 93% 99% 98%   Weight:       Height:           Discharge Medications:      Discharge Medications      New Medications      Instructions Start Date   aspirin  MG tablet   325 mg, Oral, 2 Times Daily With Meals      docusate sodium 100 MG capsule   100 mg, Oral, 2 Times Daily PRN      oxyCODONE-acetaminophen 5-325 MG per tablet  Commonly known as: PERCOCET   1-2 tablets, Oral, Every 4 Hours PRN      oxyCODONE-acetaminophen 5-325 MG per tablet  Commonly known as: PERCOCET   1-2 tablets, Oral, Every 4 Hours PRN         Continue These Medications      Instructions Start Date   albuterol sulfate  (90 Base) MCG/ACT inhaler  Commonly known as: PROVENTIL HFA;VENTOLIN HFA;PROAIR HFA   2 puffs, Inhalation, Every 4 Hours PRN      allopurinol 100 MG tablet  Commonly known as: ZYLOPRIM   100 mg, Oral, Daily      amLODIPine 5 MG tablet  Commonly known as: NORVASC   10 mg, Oral, Nightly      ANACIN PO   500 mg, Oral, 3 Times Daily      atorvastatin 20 MG tablet  Commonly known as: LIPITOR   20 mg, Oral, Daily      Cyanocobalamin 500 MCG sublingual tablet   500 mcg, Sublingual, Daily      Dulaglutide 0.75 MG/0.5ML solution pen-injector   0.75 mg, Subcutaneous, Weekly, sunday      metoprolol tartrate 25 MG tablet  Commonly known as: LOPRESSOR   25 mg, Oral, Nightly      potassium chloride 10 MEQ CR tablet   TAKE ONE TABLET BY MOUTH DAILY      triamterene-hydrochlorothiazide 75-50 MG per tablet  Commonly known as: MAXZIDE   1 tablet, Oral, Daily      Viibryd 40 MG tablet tablet  Generic drug: vilazodone   40 mg, Oral, Daily         Stop These Medications    chlorhexidine 4 % external liquid  Commonly known as: HIBICLENS     mupirocin 2 % ointment  Commonly known as: BACTROBAN            Pain Medications:  Percocet 5/325 mg 1-2 po q 4-6 hours prn pain    DVT Prophylaxis:  Enteric Coated Aspirin 325 mg po twice daily for 2 weeks, then one daily for 4 weeks      Total Knee  Replacement Discharge Instructions:    I. ACTIVITIES:  1. Exercises:  ? Complete exercise program as taught by the hospital physical therapist 2 times per day  ? Exercise program will be advanced by the physical therapist  ? During the day be up ambulating every 2 hours (while awake) for short distances  ? Complete the ankle pump exercises at least 10 times per hour (while awake)  ? Elevate legs when in bed and for at least 30 minutes during the day.Use cold packs 20-30 minutes approximately 5 times per day. This should be done before and after completing your exercises and at any time you are experiencing pain/ stiffness in your operative extremity.      2. Activities of Daily Living:  ? No tub baths, hot tubs, or swimming pools for 4 weeks  ? May shower and let water run over the incision on post-operative day #5 if no drainage. Do not scrub or rub the incision. Simply let the water run over the incision and pat dry.    II. Restrictions  ? Continue hip precautions as taught at the hospital  ? Your surgeon will discuss with you when you will be able to drive again.  ? Weight bearing is as tolerated  ? First week stay inside on even terrain. May go up and down stairs one stair at a time utilizing the hand rail once cleared by physical therapy to do so.  ? After one week, you may venture outside (if cleared to do so by physical therapist).    III. Precautions:  ? Everyone that comes near you should wash their hands  ? No elective dental, genital-urinary, or colon procedures or surgical procedures for 12 weeks after surgery unless absolutely necessary.  ?  If dental work or surgical procedure is deemed absolutely necessary, you will need to contact your surgeon as you will need to take antibiotics 1 hour prior to any dental work (including teeth cleanings).  ? Please discuss with your surgeon prophylactic antibiotics as the length of time this intervention will be necessary for you varies with each patient’s health  history and situation.  ? Avoid sick people. If you must be around someone who is ill, they should wear a mask.  ? Avoid visits to the Emergency Room or Urgent Care unless you are having a life threatening event.   ? If ordered stockings are to be placed on in the morning and removed at night. Monitor the stockings to ensure that any swelling is not causing the stockings to become too tight. In this case, remove stockings immediately.    IV. INCISION CARE:  ? Wash your hands prior to dressing changes  ? Change the dressing as needed to keep incision clean and dry. Utilize dry gauze and paper tape. Avoid touching the side of the gauze that goes against the incision with your hands.  ? No creams or ointments to the incision  ? May remove dressing once the incision is free of drainage  ? Do not touch or pick at the incision  ? Check incision every day and notify surgeon immediately if any of the following signs or symptoms are noted:  o Increase in redness  o Increase in swelling around the incision and of the entire extremity  o Increase in pain  o Drainage oozing from the incision  o Pulling apart of the edges of the incision  o Increase in overall body temperature (greater than 100.5 degrees)  ? Your surgeon will instruct you regarding suture or staple removal    V. Medications:   1. Anticoagulants: You will be discharged on an anticoagulant. This is a prophylactic medication that helps prevent blood clots during your post-operative period. The type and length of dosage varies based on your individual needs, procedure performed, and surgeon’s preference.  ? While taking the anticoagulant, you should avoid taking any additional aspirin, ibuprofen (Advil or Motrin), Aleve (Naprosyn) or other non-steroidal anti-inflammatory medications.   ? Notify surgeon immediately if any master bleeding is noted in the urine, stool, emesis, or from the nose or the incision. Blood in the stool will often appear as black rather than  red. Blood in urine may appear as pink. Blood in emesis may appear as brown/black like coffee grounds.  ? You will need to apply pressure for longer periods of time to any cuts or abrasions to stop bleeding  ? Avoid alcohol while taking anticoagulants    2. Stool Softeners: You will be at greater risk of constipation after surgery due to being less mobile and the pain medications.   ? Take stool softeners as instructed by your surgeon while on pain medications. Over the counter Colace 100 mg 1-2 capsules twice daily.   ? If stools become too loose or too frequent, please decreases the dosage or stop the stool softener.  ? If constipation occurs despite use of stool softeners, you are to continue the stool softeners and add a laxative (Milk of Magnesia 1 ounce daily as needed)  ? Drink plenty of fluids, and eat fruits and vegetables during your recovery time    3. Pain Medications utilized after surgery are narcotics and the law requires that the following information be given to all patients that are prescribed narcotics:  ? CLASSIFICATION: Pain medications are called Opioids and are narcotics  ? LEGALITIES: It is illegal to share narcotics with others and to drive within 24 hours of taking narcotics  ? POTENTIAL SIDE EFFECTS: Potential side effects of opioids include: nausea, vomiting, itching, dizziness, drowsiness, dry mouth, constipation, and difficulty urinating.  ? POTENTIAL ADVERSE EFFECTS:   o Opioid tolerance can develop with use of pain medications and this simply means that it requires more and more of the medication to control pain; however, this is seen more in patients that use opioids for longer periods of time.  o Opioid dependence can develop with use of Opioids and this simply means that to stop the medication can cause withdrawal symptoms; however, this is seen with patients that use Opioids for longer periods of time.  o Opioid addiction can develop with use of Opioids and the incidence of this  is very unlikely in patients who take the medications as ordered and stop the medications as instructed.  o Opioid overdose can be dangerous, but is unlikely when the medication is taken as ordered and stopped when ordered. It is important not to mix opioids with alcohol or with and type of sedative such as Benadryl as this can lead to over sedation and respiratory difficulty.  ? DOSAGE:   o Pain medications will need to be taken consistently for the first week to decrease pain and promote adequate pain relief and participation in physical therapy.  o After the initial surgical pain begins to resolve, you may begin to decrease the pain medication. By the end of 6-8 weeks, you should be off of pain medications.  o Refills will not be given by the office during evening hours, on weekends, or after 6-8 weeks post-op.  o To seek refills on pain medications during the initial 6 week post-operative period, you must call the office 48 hours in advance to request the refill. The office will then notify you when to  the prescription. DO NOT wait until you are out of the medication to request a refill.    V. FOLLOW-UP VISITS:  ? You will need to follow up in the office with your surgeon in 3 weeks. Please call this number [phone]  to schedule this appointment.  If you have any concerns or suspected complications prior to your follow up visit, please call your surgeons office. Do not wait until your appointment time if you suspect complications. These will need to be addressed in the office promptly.      Rahat Spence PA-C  [facility]  [address] [place] [address]  [phone]     4/19/2021    CC:Nima Diop MD:Rahat Camargo MD

## 2021-04-19 NOTE — PROGRESS NOTES
Orthopaedic Surgery  Progress Note  4/19/2021    Patient Name:  Jenn Dupont  YOB: 1945  Age: 75 y.o.  Medical Records Number:  [id number]  Date of Admission: 4/13/2021    No complaints    Vitals:  Vitals:    04/19/21 0713 04/19/21 0722 04/19/21 0729 04/19/21 0739   BP: 113/84      BP Location: Left arm      Patient Position: Lying      Pulse: 88 95 90 91   Resp: 20 18 18 18   Temp: 98.3 °F (36.8 °C)      TempSrc: Oral      SpO2: 93% 93% 99% 98%   Weight:       Height:           LLE:  NVI, calf nontender, sensation intact  No signs of DVT    Incision: clean, no signs of infection    Lab Results (last 24 hours)     Procedure Component Value Units Date/Time    POC Glucose Once [[id number]]  (Abnormal) Collected: 04/19/21 0652    Specimen: Blood Updated: 04/19/21 0653     Glucose 158 mg/dL     POC Glucose Once [[id number]]  (Abnormal) Collected: 04/18/21 2144    Specimen: Blood Updated: 04/18/21 2146     Glucose 160 mg/dL     POC Glucose Once [[id number]]  (Abnormal) Collected: 04/18/21 1614    Specimen: Blood Updated: 04/18/21 1615     Glucose 155 mg/dL           Assesment/Plan:    Procedures:  Left TKA Revision  Postoperative Day: 6  Weightbearing Status:  WBAT with walker  DVT Prophylaxis:  ASA for DVT prophylaxis    Disposition:  Rehab after PT today, if comfortable and mobilizing safely.  Pt has been cleared by Pulmonology to transfer to rehab where Oxygen will be weaned.    Rahat Spence  [facility]  [address] [place] [address]  [phone]    4/19/2021

## 2021-04-19 NOTE — PROGRESS NOTES
Discharge Planning Assessment  [facility]     Patient Name: Jenn Dupont  MRN: [id number]  Today's Date: 4/19/2021    Admit Date: 4/13/2021    Discharge Needs Assessment    No documentation.       Discharge Plan     Row Name 04/19/21 0947       Plan    Plan  skilled rehab today at [facility]    Final Discharge Disposition Code  03 - skilled nursing facility (SNF)    Final Note  has discharge orders today and will go to [facility] in [place] via [facility] at 11am. Call placed to family member he is agreeable with this discharge plan. Carlo GUZMÁN        Continued Care and Services - Admitted Since 4/13/2021     Destination Coordination complete    Service Provider Request Status Selected Services Address Phone Fax Patient Preferred    [facility] [address] [place] [address] [place] [address] [phone] --              Expected Discharge Date and Time     Expected Discharge Date Expected Discharge Time    Apr 19, 2021         Demographic Summary    No documentation.       Functional Status    No documentation.       Psychosocial    No documentation.       Abuse/Neglect    No documentation.       Legal    No documentation.       Substance Abuse    No documentation.       Patient Forms    No documentation.           Rimma Leavitt, RN

## 2021-04-19 NOTE — NURSING NOTE
"Pt woke up extremely agitated and suspicious this AM. She has screamed out \"help\" x 2 this AM. Every time the nursing staff go in to help her and answer her questions, she claims that the staff is lying to her. Pt is disoriented to situation. Will ctm.  "

## 2021-04-19 NOTE — PLAN OF CARE
Goal Outcome Evaluation:  Plan of Care Reviewed With: patient  Progress: improving  Outcome Summary: pt is disoriented to situation and very tearful. pt is on 3 L o2 while sleeping, 2L when awake. refused the bipap tonight. pt received PO percocet x 1 for left knee pain. left knee dressing changed, clean dry and intact. blood sugar checked. q2H turns. purewick in place to monitor for urine output.possible discharge today if Dr. Camargo approves. d/c to [facility]. will ctm.

## 2021-04-21 NOTE — PROGRESS NOTES
Case Management Discharge Note      Final Note: [facility] skilled rehab via [facility]         Selected Continued Care - Discharged on 4/19/2021 Admission date: 4/13/2021 - Discharge disposition: Skilled Nursing Facility (DC - External)    Destination Coordination complete    Service Provider Selected Services Address Phone Fax Patient Preferred    [facility] [place] [address] [address] [place] [address] [phone] --          Durable Medical Equipment    No services have been selected for the patient.              Dialysis/Infusion    No services have been selected for the patient.              Home Medical Care    No services have been selected for the patient.              Therapy    No services have been selected for the patient.              Community Resources    No services have been selected for the patient.                       Final Discharge Disposition Code: 03 - skilled nursing facility (SNF)

## 2021-05-03 ENCOUNTER — TELEPHONE (OUTPATIENT)
Dept: ORTHOPEDIC SURGERY | Facility: HOSPITAL | Age: 76
End: 2021-05-03

## 2021-05-03 NOTE — TELEPHONE ENCOUNTER
Called and spoke with Ms. Dupont as she is 3 weeks S/P Left Knee Revision. She is complaining of having a lot of pain. She states she is at a 9+ all the time day and night. She is still taking her pain medication. Patient states that she just got a new prescription and she hopes that it will help. She states that the pain is making her weak and she doesn't want to walk. She continues to receive  PT and just finished a session prior to this call. Discussed with patient the importance of ice and elevation but also the importance of ambulation and exercise. She states the incision looks good. No more than normal swelling, no tenderness, or warmth to the extremity. Inquired about the SOA and decreased oxygen levels that required her to stay in the hospital, she said those have resolved and she is having no issues with that at this time. No questions/concerns for me at this time. My contact information was given to the patient she verbalized understanding.

## 2021-08-10 ENCOUNTER — TRANSCRIBE ORDERS (OUTPATIENT)
Dept: ADMINISTRATIVE | Facility: HOSPITAL | Age: 76
End: 2021-08-10

## 2021-08-10 DIAGNOSIS — M54.41 CHRONIC RIGHT-SIDED LOW BACK PAIN WITH RIGHT-SIDED SCIATICA: Primary | ICD-10-CM

## 2021-08-10 DIAGNOSIS — G89.29 CHRONIC RIGHT-SIDED LOW BACK PAIN WITH RIGHT-SIDED SCIATICA: Primary | ICD-10-CM

## (undated) DEVICE — TRAP FLD MINIVAC MEGADYNE 100ML

## (undated) DEVICE — GLV SURG PREMIERPRO ORTHO LTX PF SZ7.5 BRN

## (undated) DEVICE — PENCL E/S ULTRAVAC TELESCP NOSE HOLSTR 10FT

## (undated) DEVICE — NEEDLE, QUINCKE, 20GX3.5": Brand: MEDLINE

## (undated) DEVICE — 3 BONE CEMENT MIXER: Brand: MIXEVAC

## (undated) DEVICE — DRAPE,REIN 53X77,STERILE: Brand: MEDLINE

## (undated) DEVICE — STPLR SKIN VISISTAT WD 35CT

## (undated) DEVICE — GLV SURG BIOGEL LTX PF 7 1/2

## (undated) DEVICE — RECIPROCATING BLADE, DOUBLE SIDED, OFFSET  (70.0 X 0.64 X 12.6MM)

## (undated) DEVICE — ANTIBACTERIAL UNDYED BRAIDED (POLYGLACTIN 910), SYNTHETIC ABSORBABLE SUTURE: Brand: COATED VICRYL

## (undated) DEVICE — THIN OSTEOTOME BLADE 10MM X 5 IN: Brand: RENOVATION

## (undated) DEVICE — TOWEL,OR,DSP,ST,BLUE,STD,4/PK,20PK/CS: Brand: MEDLINE

## (undated) DEVICE — TOTAL TRAY, 16FR 10ML SIL FOLEY, URN: Brand: MEDLINE

## (undated) DEVICE — APPL CHLORAPREP HI/LITE 26ML ORNG

## (undated) DEVICE — MAT FLR ABSORBENT LG 4FT 10 2.5FT

## (undated) DEVICE — PK KN TOTL 40

## (undated) DEVICE — BNDG ELAS ELITE V/CLOSE 4IN 5YD LF STRL

## (undated) DEVICE — PAD,ABDOMINAL,8"X10",ST,LF: Brand: MEDLINE

## (undated) DEVICE — UNDERCAST PADDING: Brand: DEROYAL

## (undated) DEVICE — DRSNG GZ PETROLTM XEROFORM CURAD 1X8IN STRL

## (undated) DEVICE — PIN DRL NOHEAD TROC 3.2X75MM